# Patient Record
Sex: MALE | Race: WHITE | Employment: FULL TIME | URBAN - NONMETROPOLITAN AREA
[De-identification: names, ages, dates, MRNs, and addresses within clinical notes are randomized per-mention and may not be internally consistent; named-entity substitution may affect disease eponyms.]

---

## 2017-06-12 ENCOUNTER — TELEPHONE (OUTPATIENT)
Dept: FAMILY MEDICINE CLINIC | Age: 32
End: 2017-06-12

## 2017-06-12 DIAGNOSIS — I10 ESSENTIAL HYPERTENSION: Primary | Chronic | ICD-10-CM

## 2017-06-12 RX ORDER — LISINOPRIL AND HYDROCHLOROTHIAZIDE 20; 12.5 MG/1; MG/1
1 TABLET ORAL DAILY
Qty: 90 TABLET | Refills: 3 | Status: SHIPPED | OUTPATIENT
Start: 2017-06-12 | End: 2018-07-12 | Stop reason: SDUPTHER

## 2017-06-26 ENCOUNTER — TELEPHONE (OUTPATIENT)
Dept: FAMILY MEDICINE CLINIC | Age: 32
End: 2017-06-26

## 2017-06-26 DIAGNOSIS — R74.01 ELEVATED ALT MEASUREMENT: Primary | ICD-10-CM

## 2017-07-03 ENCOUNTER — TELEPHONE (OUTPATIENT)
Dept: FAMILY MEDICINE CLINIC | Age: 32
End: 2017-07-03

## 2017-07-25 ENCOUNTER — OFFICE VISIT (OUTPATIENT)
Dept: FAMILY MEDICINE CLINIC | Age: 32
End: 2017-07-25
Payer: COMMERCIAL

## 2017-07-25 VITALS
HEART RATE: 82 BPM | TEMPERATURE: 97.9 F | SYSTOLIC BLOOD PRESSURE: 132 MMHG | DIASTOLIC BLOOD PRESSURE: 84 MMHG | WEIGHT: 236 LBS | HEIGHT: 70 IN | BODY MASS INDEX: 33.79 KG/M2 | RESPIRATION RATE: 16 BRPM

## 2017-07-25 DIAGNOSIS — R74.01 ELEVATED ALT MEASUREMENT: ICD-10-CM

## 2017-07-25 DIAGNOSIS — K21.9 GASTROESOPHAGEAL REFLUX DISEASE, ESOPHAGITIS PRESENCE NOT SPECIFIED: Chronic | ICD-10-CM

## 2017-07-25 DIAGNOSIS — R07.9 CHEST PAIN, UNSPECIFIED TYPE: ICD-10-CM

## 2017-07-25 DIAGNOSIS — I10 ESSENTIAL HYPERTENSION: Primary | Chronic | ICD-10-CM

## 2017-07-25 DIAGNOSIS — R13.14 PHARYNGOESOPHAGEAL DYSPHAGIA: ICD-10-CM

## 2017-07-25 DIAGNOSIS — F17.220 CHEWING TOBACCO NICOTINE DEPENDENCE WITHOUT COMPLICATION: Chronic | ICD-10-CM

## 2017-07-25 PROCEDURE — 99214 OFFICE O/P EST MOD 30 MIN: CPT | Performed by: FAMILY MEDICINE

## 2017-07-25 PROCEDURE — 93000 ELECTROCARDIOGRAM COMPLETE: CPT | Performed by: FAMILY MEDICINE

## 2017-07-25 RX ORDER — ASPIRIN 81 MG/1
81 TABLET ORAL DAILY
Qty: 30 TABLET | Refills: 1 | Status: SHIPPED | OUTPATIENT
Start: 2017-07-25 | End: 2018-07-12

## 2017-07-25 RX ORDER — OMEPRAZOLE 20 MG/1
20 CAPSULE, DELAYED RELEASE ORAL DAILY
Qty: 30 CAPSULE | Refills: 5 | Status: SHIPPED | OUTPATIENT
Start: 2017-07-25 | End: 2018-07-12 | Stop reason: SDUPTHER

## 2017-07-25 ASSESSMENT — PATIENT HEALTH QUESTIONNAIRE - PHQ9
SUM OF ALL RESPONSES TO PHQ9 QUESTIONS 1 & 2: 2
2. FEELING DOWN, DEPRESSED OR HOPELESS: 1
1. LITTLE INTEREST OR PLEASURE IN DOING THINGS: 1
SUM OF ALL RESPONSES TO PHQ QUESTIONS 1-9: 2

## 2017-08-08 ENCOUNTER — OFFICE VISIT (OUTPATIENT)
Dept: CARDIOLOGY CLINIC | Age: 32
End: 2017-08-08
Payer: COMMERCIAL

## 2017-08-08 VITALS
WEIGHT: 232.6 LBS | HEART RATE: 72 BPM | HEIGHT: 69 IN | SYSTOLIC BLOOD PRESSURE: 138 MMHG | DIASTOLIC BLOOD PRESSURE: 88 MMHG | BODY MASS INDEX: 34.45 KG/M2

## 2017-08-08 DIAGNOSIS — R07.9 CHEST PAIN, UNSPECIFIED TYPE: Primary | ICD-10-CM

## 2017-08-08 DIAGNOSIS — I10 ESSENTIAL HYPERTENSION: Chronic | ICD-10-CM

## 2017-08-08 DIAGNOSIS — Z87.891 FORMER SMOKER: Chronic | ICD-10-CM

## 2017-08-08 PROCEDURE — 99213 OFFICE O/P EST LOW 20 MIN: CPT | Performed by: INTERNAL MEDICINE

## 2017-08-08 ASSESSMENT — ENCOUNTER SYMPTOMS
COUGH: 0
BLOATING: 0
NAIL CHANGES: 0
DOUBLE VISION: 0
HOARSE VOICE: 0
SHORTNESS OF BREATH: 1
APHONIA: 0
HEMOPTYSIS: 0
BACK PAIN: 0
ABDOMINAL PAIN: 0
COLOR CHANGE: 0
EYE DISCHARGE: 0
SLEEP DISTURBANCES DUE TO BREATHING: 0
ORTHOPNEA: 0
CHANGE IN BOWEL HABIT: 0
EYE PAIN: 0
DIARRHEA: 0
PHOTOPHOBIA: 0
BLURRED VISION: 0
CONSTIPATION: 0
SNORING: 0

## 2017-08-14 ENCOUNTER — HOSPITAL ENCOUNTER (OUTPATIENT)
Dept: NON INVASIVE DIAGNOSTICS | Age: 32
Discharge: HOME | End: 2017-08-14
Payer: COMMERCIAL

## 2017-08-14 VITALS — HEIGHT: 69 IN | WEIGHT: 240 LBS | BODY MASS INDEX: 35.55 KG/M2

## 2017-08-14 DIAGNOSIS — R07.9 CHEST PAIN, UNSPECIFIED TYPE: ICD-10-CM

## 2017-08-14 LAB
LV EF: 61 %
LVEF MODALITY: NORMAL

## 2017-08-14 PROCEDURE — 78452 HT MUSCLE IMAGE SPECT MULT: CPT

## 2017-08-14 PROCEDURE — 3430000000 HC RX DIAGNOSTIC RADIOPHARMACEUTICAL: Performed by: INTERNAL MEDICINE

## 2017-08-14 PROCEDURE — 93017 CV STRESS TEST TRACING ONLY: CPT

## 2017-08-14 PROCEDURE — A9500 TC99M SESTAMIBI: HCPCS | Performed by: INTERNAL MEDICINE

## 2017-08-14 RX ADMIN — Medication 9.4 MILLICURIE: at 13:10

## 2017-08-14 RX ADMIN — Medication 33.9 MILLICURIE: at 14:00

## 2017-09-05 ENCOUNTER — OFFICE VISIT (OUTPATIENT)
Dept: CARDIOLOGY CLINIC | Age: 32
End: 2017-09-05
Payer: COMMERCIAL

## 2017-09-05 VITALS
SYSTOLIC BLOOD PRESSURE: 132 MMHG | HEIGHT: 69 IN | BODY MASS INDEX: 34.8 KG/M2 | WEIGHT: 235 LBS | DIASTOLIC BLOOD PRESSURE: 74 MMHG | HEART RATE: 70 BPM

## 2017-09-05 DIAGNOSIS — R07.9 CHEST PAIN, UNSPECIFIED TYPE: Primary | ICD-10-CM

## 2017-09-05 PROCEDURE — 99213 OFFICE O/P EST LOW 20 MIN: CPT | Performed by: INTERNAL MEDICINE

## 2017-09-05 PROCEDURE — 93000 ELECTROCARDIOGRAM COMPLETE: CPT | Performed by: INTERNAL MEDICINE

## 2017-09-05 ASSESSMENT — ENCOUNTER SYMPTOMS
COUGH: 0
SNORING: 0
DOUBLE VISION: 0
APHONIA: 0
EYE DISCHARGE: 0
BACK PAIN: 0
DIARRHEA: 0
HEMOPTYSIS: 0
PHOTOPHOBIA: 0
NAIL CHANGES: 0
EYE PAIN: 0
BLURRED VISION: 0
ORTHOPNEA: 0
SHORTNESS OF BREATH: 1
COLOR CHANGE: 0
ABDOMINAL PAIN: 0
BLOATING: 0
SLEEP DISTURBANCES DUE TO BREATHING: 0
CONSTIPATION: 0
CHANGE IN BOWEL HABIT: 0
HOARSE VOICE: 0

## 2017-10-18 ENCOUNTER — TELEPHONE (OUTPATIENT)
Dept: FAMILY MEDICINE CLINIC | Age: 32
End: 2017-10-18

## 2017-10-18 NOTE — TELEPHONE ENCOUNTER
2nd attempt to contact the pt re:overdue labs Dr Leslie Roberts ordered on 7/25/17. HIPAA form is up to date, order mailed.

## 2018-01-15 ENCOUNTER — HOSPITAL ENCOUNTER (OUTPATIENT)
Age: 33
Discharge: HOME | End: 2018-01-15
Payer: COMMERCIAL

## 2018-01-15 DIAGNOSIS — R74.01 ELEVATED ALT MEASUREMENT: ICD-10-CM

## 2018-01-15 LAB
ALBUMIN SERPL-MCNC: 4.4 G/DL (ref 3.5–5.1)
ALP BLD-CCNC: 73 U/L (ref 38–126)
ALT SERPL-CCNC: 27 U/L (ref 11–66)
AST SERPL-CCNC: 18 U/L (ref 5–40)
BILIRUB SERPL-MCNC: 0.6 MG/DL (ref 0.3–1.2)
BILIRUBIN DIRECT: < 0.2 MG/DL (ref 0–0.3)
TOTAL PROTEIN: 7.2 G/DL (ref 6.1–8)

## 2018-01-15 PROCEDURE — 36415 COLL VENOUS BLD VENIPUNCTURE: CPT

## 2018-01-15 PROCEDURE — 80076 HEPATIC FUNCTION PANEL: CPT

## 2018-01-16 ENCOUNTER — TELEPHONE (OUTPATIENT)
Dept: FAMILY MEDICINE CLINIC | Age: 33
End: 2018-01-16

## 2018-01-16 NOTE — TELEPHONE ENCOUNTER
----- Message from Evelin Duenas,  sent at 1/15/2018  7:02 PM EST -----  Please let pt know that repeat LFT WNL  Will check again at July visit. Let me know if questions, thanks!

## 2018-02-04 ENCOUNTER — APPOINTMENT (OUTPATIENT)
Dept: CT IMAGING | Age: 33
End: 2018-02-04

## 2018-02-04 ENCOUNTER — HOSPITAL ENCOUNTER (EMERGENCY)
Age: 33
Discharge: HOME | End: 2018-02-04

## 2018-02-04 VITALS
WEIGHT: 220 LBS | SYSTOLIC BLOOD PRESSURE: 154 MMHG | TEMPERATURE: 98.9 F | BODY MASS INDEX: 32.49 KG/M2 | HEART RATE: 83 BPM | RESPIRATION RATE: 18 BRPM | OXYGEN SATURATION: 100 % | DIASTOLIC BLOOD PRESSURE: 92 MMHG

## 2018-02-04 DIAGNOSIS — K02.9 DENTAL CARIES: Primary | ICD-10-CM

## 2018-02-04 LAB
ANION GAP SERPL CALCULATED.3IONS-SCNC: 14 MEQ/L (ref 8–16)
BASOPHILS # BLD: 0.4 %
BASOPHILS ABSOLUTE: 0 THOU/MM3 (ref 0–0.1)
BUN BLDV-MCNC: 6 MG/DL (ref 7–22)
CALCIUM SERPL-MCNC: 9.2 MG/DL (ref 8.5–10.5)
CHLORIDE BLD-SCNC: 101 MEQ/L (ref 98–111)
CO2: 25 MEQ/L (ref 23–33)
CREAT SERPL-MCNC: 0.9 MG/DL (ref 0.4–1.2)
EOSINOPHIL # BLD: 1.6 %
EOSINOPHILS ABSOLUTE: 0.1 THOU/MM3 (ref 0–0.4)
GFR SERPL CREATININE-BSD FRML MDRD: > 90 ML/MIN/1.73M2
GLUCOSE BLD-MCNC: 98 MG/DL (ref 70–108)
GROUP A STREP CULTURE, REFLEX: NEGATIVE
HCT VFR BLD CALC: 42.4 % (ref 42–52)
HEMOGLOBIN: 14.2 GM/DL (ref 14–18)
LYMPHOCYTES # BLD: 33.9 %
LYMPHOCYTES ABSOLUTE: 2.1 THOU/MM3 (ref 1–4.8)
MCH RBC QN AUTO: 29.5 PG (ref 27–31)
MCHC RBC AUTO-ENTMCNC: 33.5 GM/DL (ref 33–37)
MCV RBC AUTO: 87.8 FL (ref 80–94)
MONOCYTES # BLD: 7.1 %
MONOCYTES ABSOLUTE: 0.4 THOU/MM3 (ref 0.4–1.3)
NUCLEATED RED BLOOD CELLS: 0 /100 WBC
OSMOLALITY CALCULATION: 277 MOSMOL/KG (ref 275–300)
PDW BLD-RTO: 13.1 % (ref 11.5–14.5)
PLATELET # BLD: 273 THOU/MM3 (ref 130–400)
PMV BLD AUTO: 8.1 FL (ref 7.4–10.4)
POTASSIUM SERPL-SCNC: 3.8 MEQ/L (ref 3.5–5.2)
RBC # BLD: 4.82 MILL/MM3 (ref 4.7–6.1)
REFLEX THROAT C + S: NORMAL
SEG NEUTROPHILS: 57 %
SEGMENTED NEUTROPHILS ABSOLUTE COUNT: 3.5 THOU/MM3 (ref 1.8–7.7)
SODIUM BLD-SCNC: 140 MEQ/L (ref 135–145)
WBC # BLD: 6.2 THOU/MM3 (ref 4.8–10.8)

## 2018-02-04 PROCEDURE — 70491 CT SOFT TISSUE NECK W/DYE: CPT

## 2018-02-04 PROCEDURE — 36415 COLL VENOUS BLD VENIPUNCTURE: CPT

## 2018-02-04 PROCEDURE — 99284 EMERGENCY DEPT VISIT MOD MDM: CPT

## 2018-02-04 PROCEDURE — 6360000002 HC RX W HCPCS: Performed by: NURSE PRACTITIONER

## 2018-02-04 PROCEDURE — 87070 CULTURE OTHR SPECIMN AEROBIC: CPT

## 2018-02-04 PROCEDURE — 80048 BASIC METABOLIC PNL TOTAL CA: CPT

## 2018-02-04 PROCEDURE — 6360000004 HC RX CONTRAST MEDICATION: Performed by: NURSE PRACTITIONER

## 2018-02-04 PROCEDURE — 87880 STREP A ASSAY W/OPTIC: CPT

## 2018-02-04 PROCEDURE — 85025 COMPLETE CBC W/AUTO DIFF WBC: CPT

## 2018-02-04 PROCEDURE — 96374 THER/PROPH/DIAG INJ IV PUSH: CPT

## 2018-02-04 RX ORDER — KETOROLAC TROMETHAMINE 30 MG/ML
30 INJECTION, SOLUTION INTRAMUSCULAR; INTRAVENOUS ONCE
Status: COMPLETED | OUTPATIENT
Start: 2018-02-04 | End: 2018-02-04

## 2018-02-04 RX ORDER — PENICILLIN V POTASSIUM 500 MG/1
500 TABLET ORAL 4 TIMES DAILY
Qty: 40 TABLET | Refills: 0 | Status: SHIPPED | OUTPATIENT
Start: 2018-02-04 | End: 2018-02-14

## 2018-02-04 RX ORDER — TRAMADOL HYDROCHLORIDE 50 MG/1
50 TABLET ORAL EVERY 8 HOURS PRN
Qty: 9 TABLET | Refills: 0 | Status: SHIPPED | OUTPATIENT
Start: 2018-02-04 | End: 2018-02-07

## 2018-02-04 RX ADMIN — IOPAMIDOL 75 ML: 755 INJECTION, SOLUTION INTRAVENOUS at 20:24

## 2018-02-04 RX ADMIN — KETOROLAC TROMETHAMINE 30 MG: 30 INJECTION, SOLUTION INTRAMUSCULAR at 19:58

## 2018-02-04 ASSESSMENT — ENCOUNTER SYMPTOMS
EYE DISCHARGE: 0
VOMITING: 0
ABDOMINAL PAIN: 0
COUGH: 0
SHORTNESS OF BREATH: 0
WHEEZING: 0
RHINORRHEA: 0
EYE REDNESS: 0
NAUSEA: 0
BACK PAIN: 0
DIARRHEA: 0
SORE THROAT: 0

## 2018-02-04 ASSESSMENT — PAIN SCALES - GENERAL: PAINLEVEL_OUTOF10: 10

## 2018-02-04 ASSESSMENT — PAIN DESCRIPTION - ORIENTATION: ORIENTATION: RIGHT

## 2018-02-04 ASSESSMENT — PAIN DESCRIPTION - PAIN TYPE: TYPE: ACUTE PAIN

## 2018-02-04 ASSESSMENT — PAIN DESCRIPTION - LOCATION: LOCATION: FACE

## 2018-02-04 ASSESSMENT — PAIN DESCRIPTION - DESCRIPTORS: DESCRIPTORS: ACHING

## 2018-02-05 NOTE — ED PROVIDER NOTES
[facility]  eMERGENCY dEPARTMENT eNCOUnter          CHIEF COMPLAINT       Chief Complaint   Patient presents with    Facial Pain       Nurses Notes reviewed and I agree except as noted in the HPI. HISTORY OF PRESENT ILLNESS    Vladimir Hawkins is a 34 y.o. male who presents to the Emergency Department for the evaluation of facial pain. The patient states the right side of his jaw hurts from under his chin up to his temple. The patient states that he is able to open his jaw and talk with no problem. He does have pain occasionally when swallowing. The patient rates the pain as 10/10 and states it worsens with touch. The patient reports having bad teeth, but denies history of dental abscess. The patient denies chills and fever. The HPI was provided by the patient. REVIEW OF SYSTEMS     Review of Systems   Constitutional: Negative for appetite change, chills, fatigue and fever. HENT: Negative for congestion, ear pain, rhinorrhea and sore throat. Right sided jaw pain under the chin to the temple   Eyes: Negative for discharge, redness and visual disturbance. Respiratory: Negative for cough, shortness of breath and wheezing. Cardiovascular: Negative for chest pain, palpitations and leg swelling. Gastrointestinal: Negative for abdominal pain, diarrhea, nausea and vomiting. Genitourinary: Negative for decreased urine volume, difficulty urinating and dysuria. Musculoskeletal: Negative for arthralgias, back pain, joint swelling and neck pain. Skin: Negative for pallor and rash. Allergic/Immunologic: Negative for environmental allergies. Neurological: Negative for dizziness, syncope, weakness, light-headedness and headaches. Hematological: Negative for adenopathy. Psychiatric/Behavioral: Negative for agitation, confusion, dysphoric mood and suicidal ideas. The patient is not nervous/anxious.         PAST MEDICAL HISTORY    has a past medical history of Dependence on nicotine from chewing tobacco; Eczema; Former smoker; GERD (gastroesophageal reflux disease); History of asthma; Hypertension; and Migraine headache. SURGICAL HISTORY      has a past surgical history that includes Colonoscopy; laparoscopic appendectomy (2013); Appendectomy (2013); Tonsillectomy; and Finger surgery (10/17/15). CURRENT MEDICATIONS       Previous Medications    ASPIRIN EC 81 MG EC TABLET    Take 1 tablet by mouth daily    LISINOPRIL-HYDROCHLOROTHIAZIDE (PRINZIDE) 20-12.5 MG PER TABLET    Take 1 tablet by mouth daily    OMEPRAZOLE (PRILOSEC) 20 MG DELAYED RELEASE CAPSULE    Take 1 capsule by mouth daily       ALLERGIES     has No Known Allergies. FAMILY HISTORY     indicated that his mother is . He indicated that his father is . He indicated that the status of his paternal grandfather is unknown. He indicated that the status of his other is unknown. He indicated that the status of his neg hx is unknown.    family history includes Early Death in his father; Heart Disease (age of onset: 52) in his mother; High Blood Pressure in an other family member; Prostate Cancer in his paternal grandfather. SOCIAL HISTORY      reports that he quit smoking about 3 years ago. His smoking use included Cigarettes. He has a 9.00 pack-year smoking history. He uses smokeless tobacco. He reports that he does not drink alcohol or use drugs. PHYSICAL EXAM     INITIAL VITALS:  weight is 220 lb (99.8 kg). His oral temperature is 98.9 °F (37.2 °C). His blood pressure is 154/92 (abnormal) and his pulse is 83. His respiration is 18 and oxygen saturation is 100%. Physical Exam   Constitutional: He is oriented to person, place, and time. He appears well-developed and well-nourished. HENT:   Head: Normocephalic and atraumatic. Right Ear: External ear normal.   Left Ear: External ear normal.   Mouth/Throat: Uvula is midline and mucous membranes are normal. No trismus in the jaw.  No

## 2018-02-06 LAB — THROAT/NOSE CULTURE: NORMAL

## 2018-04-19 ENCOUNTER — HOSPITAL ENCOUNTER (OUTPATIENT)
Age: 33
Discharge: HOME | End: 2018-04-19
Payer: COMMERCIAL

## 2018-04-19 LAB
ALBUMIN SERPL-MCNC: 4.3 G/DL (ref 3.5–5.1)
ALP BLD-CCNC: 82 U/L (ref 38–126)
ALT SERPL-CCNC: 28 U/L (ref 11–66)
AST SERPL-CCNC: 19 U/L (ref 5–40)
BILIRUB SERPL-MCNC: 0.5 MG/DL (ref 0.3–1.2)
BILIRUBIN DIRECT: < 0.2 MG/DL (ref 0–0.3)
TOTAL PROTEIN: 7.3 G/DL (ref 6.1–8)

## 2018-04-19 PROCEDURE — 36415 COLL VENOUS BLD VENIPUNCTURE: CPT

## 2018-04-19 PROCEDURE — 80076 HEPATIC FUNCTION PANEL: CPT

## 2018-07-11 PROBLEM — R74.01 ELEVATED ALT MEASUREMENT: Status: RESOLVED | Noted: 2017-07-25 | Resolved: 2018-07-11

## 2018-07-12 ENCOUNTER — OFFICE VISIT (OUTPATIENT)
Dept: FAMILY MEDICINE CLINIC | Age: 33
End: 2018-07-12
Payer: COMMERCIAL

## 2018-07-12 VITALS
HEIGHT: 69 IN | HEART RATE: 80 BPM | TEMPERATURE: 98.2 F | RESPIRATION RATE: 16 BRPM | DIASTOLIC BLOOD PRESSURE: 76 MMHG | WEIGHT: 219 LBS | SYSTOLIC BLOOD PRESSURE: 122 MMHG | BODY MASS INDEX: 32.44 KG/M2

## 2018-07-12 DIAGNOSIS — F17.220 CHEWING TOBACCO NICOTINE DEPENDENCE WITHOUT COMPLICATION: Chronic | ICD-10-CM

## 2018-07-12 DIAGNOSIS — I10 ESSENTIAL HYPERTENSION: Primary | ICD-10-CM

## 2018-07-12 DIAGNOSIS — K58.0 IRRITABLE BOWEL SYNDROME WITH DIARRHEA: ICD-10-CM

## 2018-07-12 DIAGNOSIS — K76.0 NAFLD (NONALCOHOLIC FATTY LIVER DISEASE): ICD-10-CM

## 2018-07-12 DIAGNOSIS — K21.9 GASTROESOPHAGEAL REFLUX DISEASE, ESOPHAGITIS PRESENCE NOT SPECIFIED: ICD-10-CM

## 2018-07-12 PROBLEM — R07.9 CHEST PAIN: Status: RESOLVED | Noted: 2017-07-25 | Resolved: 2018-07-12

## 2018-07-12 PROCEDURE — G8427 DOCREV CUR MEDS BY ELIG CLIN: HCPCS | Performed by: FAMILY MEDICINE

## 2018-07-12 PROCEDURE — G8417 CALC BMI ABV UP PARAM F/U: HCPCS | Performed by: FAMILY MEDICINE

## 2018-07-12 PROCEDURE — 99214 OFFICE O/P EST MOD 30 MIN: CPT | Performed by: FAMILY MEDICINE

## 2018-07-12 PROCEDURE — 4004F PT TOBACCO SCREEN RCVD TLK: CPT | Performed by: FAMILY MEDICINE

## 2018-07-12 RX ORDER — OMEPRAZOLE 20 MG/1
20 CAPSULE, DELAYED RELEASE ORAL DAILY
Qty: 90 CAPSULE | Refills: 3 | Status: SHIPPED | OUTPATIENT
Start: 2018-07-12 | End: 2019-07-30 | Stop reason: SDUPTHER

## 2018-07-12 RX ORDER — LISINOPRIL AND HYDROCHLOROTHIAZIDE 20; 12.5 MG/1; MG/1
1 TABLET ORAL DAILY
Qty: 90 TABLET | Refills: 3 | Status: SHIPPED | OUTPATIENT
Start: 2018-07-12 | End: 2019-07-30 | Stop reason: SDUPTHER

## 2018-07-12 NOTE — PROGRESS NOTES
Chief Complaint   Patient presents with    Follow-up     HTN, GERD, IBS, NAFLD, Chew Tobacco       History obtained from the patient. SUBJECTIVE:  Eric Syed is a 34 y.o. male that presents today for       1.) HTN:    HPI:    Taking meds as prescribed ?: Yes  Tolerating well ?: yes  Side Effects ?: denies  BP at home ?: <140/90  Working on TLCS ?: yes  Chest Pain/SOB/Palpitations? Chest pain    BP Readings from Last 3 Encounters:   07/12/18 122/76   02/04/18 (!) 154/92   09/05/17 132/74       2.) GERD:    HPI:    Taking meds as prescribed ?: yes  Tolerating well ?: yes  Side Effects ?: denies  Dysphagia ?: denies  Odynophagia ?: denies  Melena ?: denies  Working on TLCS ?: yes      3.) Elevated ALT LAST VISIT: noted on labs. Mild. Denies RUQ pain or jaundice. No ETOH use. Grandfather with cirriosis at old age. UPDATE TODAY: neg w/u, saw GI, has fatty liver. Lost some wt. Labs better. Denies RUQ pain or jaundice. 4.) IBS: on Viberzi, works well. Following with GI. Helps with diarrhea.        5.) Declines tobacco cessation      Age/Gender Health Maintenance    Lipid -   No components found for: CHLPL  Lab Results   Component Value Date    TRIG 113 06/24/2017    TRIG 79 10/24/2015    TRIG 92 10/25/2014     Lab Results   Component Value Date    HDL 35 06/24/2017    HDL 34 10/24/2015    HDL 36 10/25/2014     Lab Results   Component Value Date    LDLCALC 132 06/24/2017    [address] 107 10/24/2015    [address] 128 10/25/2014     No results found for: LABVLDL    DM Screen -   Lab Results   Component Value Date    GLUCOSE 98 02/04/2018       Colon Cancer Screening - Age 47    Tetanus - Declines July 2017  Influenza Vaccine - Candidate FALL 2017  Pneumonia Vaccine - Age 72  Zostavax - Age 60     PSA testing discussion - Age 53  AAA Screening - Age 71; smoked    Falls screening - N/A      Current Outpatient Prescriptions   Medication Sig Dispense Refill    lisinopril-hydrochlorothiazide (PRINZIDE) 20-12.5 Quit date: 10/23/2014    Smokeless tobacco: Current User    Alcohol use No      Comment: social    Drug use: No    Sexual activity: Not on file     Other Topics Concern    Not on file     Social History Narrative    No narrative on file       Family History   Problem Relation Age of Onset    Heart Disease Mother 52        MI    Early Death Father         MVA    Prostate Cancer Paternal Grandfather         unsure age   [place] High Blood Pressure Other         Pt unsure if anyone in family does.  Colon Cancer Neg Hx          I have reviewed the patient's past medical history, past surgical history, allergies, medications, social and family history and I have made updates where appropriate. Review of Systems  Positive responses are highlighted in bold    Constitutional:  Fever, Chills, Night Sweats, Fatigue, Unexpected changes in weight  HENT:  Ear pain, Tinnitus, Nosebleeds, Trouble swallowing, Hearing loss, Sore throat  Cardiovascular:  Chest Pain, Palpitations, Orthopnea, Paroxysmal Nocturnal Dyspnea  Respiratory:  Cough, Wheezing, Shortness of breath, Chest tightness, Apnea  Gastrointestinal:  Nausea, Vomiting, Diarrhea, Constipation, Heartburn, Blood in stool  Genitourinary:  Difficulty or painful urination, Flank pain, Change in frequency, Urgency  Skin:  Color change, Rash, Itching, Wound  Musculoskeletal:  Joint pain, Back pain, Gait problems, Joint swelling, Myalgias  Neurological:  Dizziness, Headaches, Presyncope, Numbness, Seizures, Tremors  Endocrine:  Heat Intolerance, Cold Intolerance, Polydipsia, Polyphagia, Polyuria      PHYSICAL EXAM:  Vitals:    07/12/18 1746   BP: 122/76   Pulse: 80   Resp: 16   Temp: 98.2 °F (36.8 °C)   TempSrc: Oral   Weight: 219 lb (99.3 kg)   Height: 5' 9\" (1.753 m)     Body mass index is 32.34 kg/m².   Pain Score:   0 - No pain    VS Reviewed  General Appearance: A&O x 3, No acute distress,well developed and well- nourished  Eyes: pupils equal, round, and reactive classes as well as 1-800-QUIT NOW. No barriers other than lack of desire. 3+ min spent counseling. DISPOSITION    Return in about 1 year (around 7/12/2019) for follow-up high blood pressure, follow-up on chronic medical conditions, sooner as needed. Melvi Longoria released without restrictions. Future Appointments  Date Time Provider Tylor Reyes   9/13/2018 11:00 AM Ena Elder MD [facility] [place]   7/18/2019 6:00 PM DO Unique Wright received counseling on the following healthy behaviors: nutrition, exercise, medication adherence and tobacco cessation    Patient given educational materials on: See Attached    I have instructed Melvi Longoria to complete a self tracking handout on Blood Sugars  and tobacco use and instructed them to bring it with them to his next appointment. Barriers to learning and self management: none    Discussed use, benefit, and side effects of prescribed medications. Barriers to medication compliance addressed. All patient questions answered. Pt voiced understanding.        Electronically signed by Erlin Hill DO on 7/12/2018 at 6:21 PM

## 2018-07-12 NOTE — PATIENT INSTRUCTIONS
LAB INSTRUCTIONS:    Please complete labs within 4 week(s). Please fast for 8 hours prior to lab collection. The clinic will call you within 1 week of collection. If you have not heard from us within that amount of time, please call us at [phone]. Patient Education        High Blood Pressure: Care Instructions  Your Care Instructions    If your blood pressure is usually above 130/80, you have high blood pressure, or hypertension. That means the top number is 130 or higher or the bottom number is 80 or higher, or both. Despite what a lot of people think, high blood pressure usually doesn't cause headaches or make you feel dizzy or lightheaded. It usually has no symptoms. But it does increase your risk for heart attack, stroke, and kidney or eye damage. The higher your blood pressure, the more your risk increases. Your doctor will give you a goal for your blood pressure. Your goal will be based on your health and your age. Lifestyle changes, such as eating healthy and being active, are always important to help lower blood pressure. You might also take medicine to reach your blood pressure goal.  Follow-up care is a key part of your treatment and safety. Be sure to make and go to all appointments, and call your doctor if you are having problems. It's also a good idea to know your test results and keep a list of the medicines you take. How can you care for yourself at home? Medical treatment  · If you stop taking your medicine, your blood pressure will go back up. You may take one or more types of medicine to lower your blood pressure. Be safe with medicines. Take your medicine exactly as prescribed. Call your doctor if you think you are having a problem with your medicine. · Talk to your doctor before you start taking aspirin every day. Aspirin can help certain people lower their risk of a heart attack or stroke.  But taking aspirin isn't right for everyone, because it can cause serious bleeding. · See your doctor regularly. You may need to see the doctor more often at first or until your blood pressure comes down. · If you are taking blood pressure medicine, talk to your doctor before you take decongestants or anti-inflammatory medicine, such as ibuprofen. Some of these medicines can raise blood pressure. · Learn how to check your blood pressure at home. Lifestyle changes  · Stay at a healthy weight. This is especially important if you put on weight around the waist. Losing even 10 pounds can help you lower your blood pressure. · If your doctor recommends it, get more exercise. Walking is a good choice. Bit by bit, increase the amount you walk every day. Try for at least 30 minutes on most days of the week. You also may want to swim, bike, or do other activities. · Avoid or limit alcohol. Talk to your doctor about whether you can drink any alcohol. · Try to limit how much sodium you eat to less than 2,300 milligrams (mg) a day. Your doctor may ask you to try to eat less than 1,500 mg a day. · Eat plenty of fruits (such as bananas and oranges), vegetables, legumes, whole grains, and low-fat dairy products. · Lower the amount of saturated fat in your diet. Saturated fat is found in animal products such as milk, cheese, and meat. Limiting these foods may help you lose weight and also lower your risk for heart disease. · Do not smoke. Smoking increases your risk for heart attack and stroke. If you need help quitting, talk to your doctor about stop-smoking programs and medicines. These can increase your chances of quitting for good. When should you call for help? Call 911 anytime you think you may need emergency care. This may mean having symptoms that suggest that your blood pressure is causing a serious heart or blood vessel problem. Your blood pressure may be over 180/110.   For example, call 911 if:    · You have symptoms of a heart attack.  These may include:  ¨ Chest pain or pressure, Health. If you have questions about a medical condition or this instruction, always ask your healthcare professional. [place] [address] any warranty or liability for your use of this information. Patient Education        Stopping Smokeless Tobacco Use: Care Instructions  Your Care Instructions    Smokeless tobacco comes in many forms, such as snuff and chewing tobacco:  · Snuff is finely ground tobacco sold in cans or pouches. Most of the time, snuff is used by putting a \"pinch\" or \"dip\" between the lower lip or cheek and the gum. · Chewing tobacco is sold as loose leaves, plugs, or twists. It is chewed or placed between the cheek and the gum or teeth. There are plenty of reasons to stop using smokeless tobacco. These products are harmful. They are not risk-free alternatives to smoking. Smokeless tobacco contains nicotine, which is addicting. Though using smokeless tobacco is less harmful than smoking cigarettes, it can cause serious health problems, such as:  · White patches or red sores in your mouth that can turn into mouth cancer involving the lip, tongue, or cheek. · Tooth loss and other dental problems. · Gum disease. Your gums may pull away from your teeth and not grow back. People who use smokeless tobacco crave the nicotine in it. Giving up smokeless tobacco is much harder than simply changing a habit. Your body has to stop craving the nicotine. It is hard to quit, but you can do it. Many tools are available for people who want to quit using smokeless tobacco. You may find that combining tools works best for you. There are several steps to quitting. First you get ready to quit. Then you get support to help you. After that, you learn new skills and behaviors to quit. For many people, a necessary step is getting and using medicine. Your doctor will help you set up the plan that best meets your needs.  You may want to attend a tobacco cessation program. When you choose a program,

## 2018-09-20 ENCOUNTER — TELEPHONE (OUTPATIENT)
Dept: CARDIOLOGY CLINIC | Age: 33
End: 2018-09-20

## 2018-10-03 ENCOUNTER — TELEPHONE (OUTPATIENT)
Dept: FAMILY MEDICINE CLINIC | Age: 33
End: 2018-10-03

## 2018-10-18 ENCOUNTER — TELEPHONE (OUTPATIENT)
Dept: FAMILY MEDICINE CLINIC | Age: 33
End: 2018-10-18

## 2018-12-03 ENCOUNTER — APPOINTMENT (OUTPATIENT)
Dept: GENERAL RADIOLOGY | Age: 33
End: 2018-12-03
Payer: COMMERCIAL

## 2018-12-03 ENCOUNTER — HOSPITAL ENCOUNTER (EMERGENCY)
Age: 33
Discharge: HOME | End: 2018-12-03
Payer: COMMERCIAL

## 2018-12-03 ENCOUNTER — APPOINTMENT (OUTPATIENT)
Dept: CT IMAGING | Age: 33
End: 2018-12-03
Payer: COMMERCIAL

## 2018-12-03 VITALS
TEMPERATURE: 97.7 F | HEIGHT: 69 IN | WEIGHT: 220 LBS | OXYGEN SATURATION: 98 % | HEART RATE: 58 BPM | SYSTOLIC BLOOD PRESSURE: 142 MMHG | RESPIRATION RATE: 15 BRPM | DIASTOLIC BLOOD PRESSURE: 83 MMHG | BODY MASS INDEX: 32.58 KG/M2

## 2018-12-03 DIAGNOSIS — M54.9 MID BACK PAIN: ICD-10-CM

## 2018-12-03 DIAGNOSIS — R07.9 CHEST PAIN, UNSPECIFIED TYPE: Primary | ICD-10-CM

## 2018-12-03 DIAGNOSIS — R06.02 SHORTNESS OF BREATH: ICD-10-CM

## 2018-12-03 LAB
ALBUMIN SERPL-MCNC: 4.1 G/DL (ref 3.5–5.1)
ALP BLD-CCNC: 87 U/L (ref 38–126)
ALT SERPL-CCNC: 64 U/L (ref 11–66)
ANION GAP SERPL CALCULATED.3IONS-SCNC: 12 MEQ/L (ref 8–16)
AST SERPL-CCNC: 53 U/L (ref 5–40)
BASOPHILS # BLD: 0.6 %
BASOPHILS ABSOLUTE: 0 THOU/MM3 (ref 0–0.1)
BILIRUB SERPL-MCNC: 0.2 MG/DL (ref 0.3–1.2)
BUN BLDV-MCNC: 5 MG/DL (ref 7–22)
C-REACTIVE PROTEIN: 0.22 MG/DL (ref 0–1)
CALCIUM SERPL-MCNC: 9 MG/DL (ref 8.5–10.5)
CHLORIDE BLD-SCNC: 100 MEQ/L (ref 98–111)
CO2: 22 MEQ/L (ref 23–33)
CREAT SERPL-MCNC: 0.8 MG/DL (ref 0.4–1.2)
EKG ATRIAL RATE: 72 BPM
EKG P AXIS: 36 DEGREES
EKG P-R INTERVAL: 152 MS
EKG Q-T INTERVAL: 388 MS
EKG QRS DURATION: 94 MS
EKG QTC CALCULATION (BAZETT): 424 MS
EKG R AXIS: -18 DEGREES
EKG T AXIS: 2 DEGREES
EKG VENTRICULAR RATE: 72 BPM
EOSINOPHIL # BLD: 1.3 %
EOSINOPHILS ABSOLUTE: 0.1 THOU/MM3 (ref 0–0.4)
ERYTHROCYTE [DISTWIDTH] IN BLOOD BY AUTOMATED COUNT: 12.3 % (ref 11.5–14.5)
ERYTHROCYTE [DISTWIDTH] IN BLOOD BY AUTOMATED COUNT: 38.9 FL (ref 35–45)
GFR SERPL CREATININE-BSD FRML MDRD: > 90 ML/MIN/1.73M2
GLUCOSE BLD-MCNC: 128 MG/DL (ref 70–108)
HCT VFR BLD CALC: 42.3 % (ref 42–52)
HEMOGLOBIN: 14.4 GM/DL (ref 14–18)
IMMATURE GRANS (ABS): 0.02 THOU/MM3 (ref 0–0.07)
IMMATURE GRANULOCYTES: 0.4 %
LIPASE: 30 U/L (ref 5.6–51.3)
LYMPHOCYTES # BLD: 34.4 %
LYMPHOCYTES ABSOLUTE: 1.6 THOU/MM3 (ref 1–4.8)
MCH RBC QN AUTO: 29.7 PG (ref 26–33)
MCHC RBC AUTO-ENTMCNC: 34 GM/DL (ref 32.2–35.5)
MCV RBC AUTO: 87.2 FL (ref 80–94)
MONOCYTES # BLD: 8 %
MONOCYTES ABSOLUTE: 0.4 THOU/MM3 (ref 0.4–1.3)
NUCLEATED RED BLOOD CELLS: 0 /100 WBC
OSMOLALITY CALCULATION: 267.1 MOSMOL/KG (ref 275–300)
PLATELET # BLD: 261 THOU/MM3 (ref 130–400)
PMV BLD AUTO: 9 FL (ref 9.4–12.4)
POTASSIUM SERPL-SCNC: 3.6 MEQ/L (ref 3.5–5.2)
PROCALCITONIN: 0.07 NG/ML (ref 0.01–0.09)
RBC # BLD: 4.85 MILL/MM3 (ref 4.7–6.1)
SEG NEUTROPHILS: 55.3 %
SEGMENTED NEUTROPHILS ABSOLUTE COUNT: 2.6 THOU/MM3 (ref 1.8–7.7)
SODIUM BLD-SCNC: 134 MEQ/L (ref 135–145)
TOTAL PROTEIN: 7.3 G/DL (ref 6.1–8)
TROPONIN T: < 0.01 NG/ML
WBC # BLD: 4.7 THOU/MM3 (ref 4.8–10.8)

## 2018-12-03 PROCEDURE — 93010 ELECTROCARDIOGRAM REPORT: CPT | Performed by: NUCLEAR MEDICINE

## 2018-12-03 PROCEDURE — 36415 COLL VENOUS BLD VENIPUNCTURE: CPT

## 2018-12-03 PROCEDURE — 85025 COMPLETE CBC W/AUTO DIFF WBC: CPT

## 2018-12-03 PROCEDURE — 71275 CT ANGIOGRAPHY CHEST: CPT

## 2018-12-03 PROCEDURE — 84484 ASSAY OF TROPONIN QUANT: CPT

## 2018-12-03 PROCEDURE — 80053 COMPREHEN METABOLIC PANEL: CPT

## 2018-12-03 PROCEDURE — 6370000000 HC RX 637 (ALT 250 FOR IP): Performed by: PHYSICIAN ASSISTANT

## 2018-12-03 PROCEDURE — 2709999900 HC NON-CHARGEABLE SUPPLY

## 2018-12-03 PROCEDURE — 83690 ASSAY OF LIPASE: CPT

## 2018-12-03 PROCEDURE — 2580000003 HC RX 258: Performed by: PHYSICIAN ASSISTANT

## 2018-12-03 PROCEDURE — 6360000004 HC RX CONTRAST MEDICATION: Performed by: PHYSICIAN ASSISTANT

## 2018-12-03 PROCEDURE — 71046 X-RAY EXAM CHEST 2 VIEWS: CPT

## 2018-12-03 PROCEDURE — 99285 EMERGENCY DEPT VISIT HI MDM: CPT

## 2018-12-03 PROCEDURE — 84145 PROCALCITONIN (PCT): CPT

## 2018-12-03 PROCEDURE — 86140 C-REACTIVE PROTEIN: CPT

## 2018-12-03 PROCEDURE — 93005 ELECTROCARDIOGRAM TRACING: CPT | Performed by: EMERGENCY MEDICINE

## 2018-12-03 RX ORDER — 0.9 % SODIUM CHLORIDE 0.9 %
1000 INTRAVENOUS SOLUTION INTRAVENOUS ONCE
Status: COMPLETED | OUTPATIENT
Start: 2018-12-03 | End: 2018-12-03

## 2018-12-03 RX ORDER — ALBUTEROL SULFATE 90 UG/1
2 AEROSOL, METERED RESPIRATORY (INHALATION) ONCE
Status: COMPLETED | OUTPATIENT
Start: 2018-12-03 | End: 2018-12-03

## 2018-12-03 RX ADMIN — SODIUM CHLORIDE 1000 ML: 9 INJECTION, SOLUTION INTRAVENOUS at 06:36

## 2018-12-03 RX ADMIN — IOPAMIDOL 80 ML: 755 INJECTION, SOLUTION INTRAVENOUS at 07:25

## 2018-12-03 RX ADMIN — ALBUTEROL SULFATE 2 PUFF: 90 AEROSOL, METERED RESPIRATORY (INHALATION) at 06:41

## 2018-12-03 ASSESSMENT — ENCOUNTER SYMPTOMS
DIARRHEA: 0
NAUSEA: 1
ABDOMINAL PAIN: 0
SHORTNESS OF BREATH: 1
EYE DISCHARGE: 0
VOMITING: 0
EYE REDNESS: 0
RHINORRHEA: 0
WHEEZING: 0
COUGH: 1
SORE THROAT: 0
BACK PAIN: 1

## 2018-12-03 ASSESSMENT — PAIN DESCRIPTION - LOCATION: LOCATION: BACK;ANKLE

## 2018-12-03 ASSESSMENT — PAIN SCALES - GENERAL: PAINLEVEL_OUTOF10: 4

## 2018-12-03 ASSESSMENT — PAIN DESCRIPTION - ORIENTATION: ORIENTATION: MID

## 2018-12-03 NOTE — ED TRIAGE NOTES
Pt comes to the ED with c/o back pain that started suddenly while sitting down. Pt states that he is having epigastric pain as well. Pt denies SOB currently but states that when the pain started he was. EKG in process. Pt's respirations are even and unlabored. Will continue to monitor.

## 2018-12-03 NOTE — ED PROVIDER NOTES
(age of onset: 52) in his mother; High Blood Pressure in an other family member; Prostate Cancer in his paternal grandfather. SOCIAL HISTORY    reports that he quit smoking about 4 years ago. His smoking use included Cigarettes. He has a 9.00 pack-year smoking history. His smokeless tobacco use includes Chew. He reports that he does not drink alcohol or use drugs. PHYSICAL EXAM     INITIAL VITALS:  height is 5' 9\" (1.753 m) and weight is 220 lb (99.8 kg). His oral temperature is 97.7 °F (36.5 °C). His blood pressure is 142/83 (abnormal) and his pulse is 58. His respiration is 15 and oxygen saturation is 98%. Physical Exam   Constitutional: He is oriented to person, place, and time. Vital signs are normal. He appears well-developed and well-nourished. Non-toxic appearance. No distress. HENT:   Head: Normocephalic and atraumatic. Right Ear: Hearing normal.   Left Ear: Hearing normal.   Nose: Nose normal. No rhinorrhea. Mouth/Throat: Uvula is midline, oropharynx is clear and moist and mucous membranes are normal. No oropharyngeal exudate. Eyes: Pupils are equal, round, and reactive to light. Conjunctivae, EOM and lids are normal. No scleral icterus. Neck: Normal range of motion. Neck supple. No neck rigidity. No tracheal deviation present. Cardiovascular: Normal rate, regular rhythm and normal heart sounds. No murmur heard. Pulmonary/Chest: Effort normal and breath sounds normal. No stridor. No respiratory distress. He has no decreased breath sounds. He has no wheezes. Abdominal: Soft. He exhibits no distension. There is no tenderness. There is no rigidity and no guarding. Musculoskeletal: Normal range of motion. He exhibits no edema. Mid back tenderness. No signs of DVT. Lymphadenopathy:     He has no cervical adenopathy. Neurological: He is alert and oriented to person, place, and time. He has normal strength. Gait normal. GCS eye subscore is 4. GCS verbal subscore is 5.  GCS motor

## 2019-07-29 NOTE — PROGRESS NOTES
healthy behaviors: nutrition, exercise, medication adherence and tobacco cessation    Patient given educational materials on: See Attached    I have instructed Timmy Flynn to complete a self tracking handout on Blood Sugars  and tobacco use and instructed them to bring it with them to his next appointment. Barriers to learning and self management: none    Discussed use, benefit, and side effects of prescribed medications. Barriers to medication compliance addressed. All patient questions answered. Pt voiced understanding.        Electronically signed by Edith Corrales DO on 7/30/2019 at 8:02 AM

## 2019-07-30 ENCOUNTER — NURSE ONLY (OUTPATIENT)
Dept: LAB | Age: 34
End: 2019-07-30

## 2019-07-30 ENCOUNTER — OFFICE VISIT (OUTPATIENT)
Dept: FAMILY MEDICINE CLINIC | Age: 34
End: 2019-07-30
Payer: COMMERCIAL

## 2019-07-30 VITALS
DIASTOLIC BLOOD PRESSURE: 78 MMHG | TEMPERATURE: 97.5 F | SYSTOLIC BLOOD PRESSURE: 132 MMHG | BODY MASS INDEX: 33.33 KG/M2 | WEIGHT: 225 LBS | HEIGHT: 69 IN | RESPIRATION RATE: 14 BRPM | HEART RATE: 62 BPM

## 2019-07-30 DIAGNOSIS — I10 ESSENTIAL HYPERTENSION: ICD-10-CM

## 2019-07-30 DIAGNOSIS — F17.220 CHEWING TOBACCO NICOTINE DEPENDENCE WITHOUT COMPLICATION: ICD-10-CM

## 2019-07-30 DIAGNOSIS — M54.2 CERVICALGIA: ICD-10-CM

## 2019-07-30 DIAGNOSIS — K58.0 IRRITABLE BOWEL SYNDROME WITH DIARRHEA: ICD-10-CM

## 2019-07-30 DIAGNOSIS — I10 ESSENTIAL HYPERTENSION: Primary | ICD-10-CM

## 2019-07-30 DIAGNOSIS — K76.0 NAFLD (NONALCOHOLIC FATTY LIVER DISEASE): ICD-10-CM

## 2019-07-30 DIAGNOSIS — M54.50 CHRONIC BILATERAL LOW BACK PAIN WITHOUT SCIATICA: ICD-10-CM

## 2019-07-30 DIAGNOSIS — M25.50 POLYARTHRALGIA: ICD-10-CM

## 2019-07-30 DIAGNOSIS — G89.29 CHRONIC BILATERAL LOW BACK PAIN WITHOUT SCIATICA: ICD-10-CM

## 2019-07-30 DIAGNOSIS — K21.9 GASTROESOPHAGEAL REFLUX DISEASE, ESOPHAGITIS PRESENCE NOT SPECIFIED: ICD-10-CM

## 2019-07-30 LAB
ALBUMIN SERPL-MCNC: 4.5 G/DL (ref 3.5–5.1)
ALP BLD-CCNC: 69 U/L (ref 38–126)
ALT SERPL-CCNC: 30 U/L (ref 11–66)
ANION GAP SERPL CALCULATED.3IONS-SCNC: 12 MEQ/L (ref 8–16)
AST SERPL-CCNC: 22 U/L (ref 5–40)
BASOPHILS # BLD: 0.4 %
BASOPHILS ABSOLUTE: 0 THOU/MM3 (ref 0–0.1)
BILIRUB SERPL-MCNC: 0.5 MG/DL (ref 0.3–1.2)
BUN BLDV-MCNC: 9 MG/DL (ref 7–22)
C-REACTIVE PROTEIN: 0.04 MG/DL (ref 0–1)
CALCIUM SERPL-MCNC: 9.6 MG/DL (ref 8.5–10.5)
CHLORIDE BLD-SCNC: 104 MEQ/L (ref 98–111)
CHOLESTEROL, TOTAL: 153 MG/DL (ref 100–199)
CO2: 24 MEQ/L (ref 23–33)
CREAT SERPL-MCNC: 0.8 MG/DL (ref 0.4–1.2)
CREATININE, URINE: 453.7 MG/DL
EOSINOPHIL # BLD: 1.6 %
EOSINOPHILS ABSOLUTE: 0.1 THOU/MM3 (ref 0–0.4)
ERYTHROCYTE [DISTWIDTH] IN BLOOD BY AUTOMATED COUNT: 12.8 % (ref 11.5–14.5)
ERYTHROCYTE [DISTWIDTH] IN BLOOD BY AUTOMATED COUNT: 41.8 FL (ref 35–45)
GFR SERPL CREATININE-BSD FRML MDRD: > 90 ML/MIN/1.73M2
GLUCOSE BLD-MCNC: 107 MG/DL (ref 70–108)
HCT VFR BLD CALC: 46.7 % (ref 42–52)
HDLC SERPL-MCNC: 39 MG/DL
HEMOGLOBIN: 15.8 GM/DL (ref 14–18)
IMMATURE GRANS (ABS): 0.02 THOU/MM3 (ref 0–0.07)
IMMATURE GRANULOCYTES: 0.4 %
LDL CHOLESTEROL CALCULATED: 91 MG/DL
LYMPHOCYTES # BLD: 39.1 %
LYMPHOCYTES ABSOLUTE: 2.2 THOU/MM3 (ref 1–4.8)
MCH RBC QN AUTO: 30.3 PG (ref 26–33)
MCHC RBC AUTO-ENTMCNC: 33.8 GM/DL (ref 32.2–35.5)
MCV RBC AUTO: 89.5 FL (ref 80–94)
MICROALBUMIN UR-MCNC: < 1.2 MG/DL
MICROALBUMIN/CREAT UR-RTO: 3 MG/G (ref 0–30)
MONOCYTES # BLD: 7.2 %
MONOCYTES ABSOLUTE: 0.4 THOU/MM3 (ref 0.4–1.3)
NUCLEATED RED BLOOD CELLS: 0 /100 WBC
PLATELET # BLD: 242 THOU/MM3 (ref 130–400)
PMV BLD AUTO: 9.7 FL (ref 9.4–12.4)
POTASSIUM SERPL-SCNC: 4.1 MEQ/L (ref 3.5–5.2)
RBC # BLD: 5.22 MILL/MM3 (ref 4.7–6.1)
RHEUMATOID FACTOR: 79 IU/ML (ref 0–13)
SEDIMENTATION RATE, ERYTHROCYTE: 4 MM/HR (ref 0–10)
SEG NEUTROPHILS: 51.3 %
SEGMENTED NEUTROPHILS ABSOLUTE COUNT: 2.9 THOU/MM3 (ref 1.8–7.7)
SODIUM BLD-SCNC: 140 MEQ/L (ref 135–145)
TOTAL PROTEIN: 7.2 G/DL (ref 6.1–8)
TRIGL SERPL-MCNC: 113 MG/DL (ref 0–199)
TSH SERPL DL<=0.05 MIU/L-ACNC: 2.88 UIU/ML (ref 0.4–4.2)
URIC ACID: 7.6 MG/DL (ref 3.7–7)
WBC # BLD: 5.6 THOU/MM3 (ref 4.8–10.8)

## 2019-07-30 PROCEDURE — 99214 OFFICE O/P EST MOD 30 MIN: CPT | Performed by: FAMILY MEDICINE

## 2019-07-30 PROCEDURE — G8427 DOCREV CUR MEDS BY ELIG CLIN: HCPCS | Performed by: FAMILY MEDICINE

## 2019-07-30 PROCEDURE — G8417 CALC BMI ABV UP PARAM F/U: HCPCS | Performed by: FAMILY MEDICINE

## 2019-07-30 PROCEDURE — 4004F PT TOBACCO SCREEN RCVD TLK: CPT | Performed by: FAMILY MEDICINE

## 2019-07-30 RX ORDER — OMEPRAZOLE 20 MG/1
20 CAPSULE, DELAYED RELEASE ORAL DAILY
Qty: 90 CAPSULE | Refills: 3 | Status: SHIPPED | OUTPATIENT
Start: 2019-07-30 | End: 2020-08-13 | Stop reason: SDUPTHER

## 2019-07-30 RX ORDER — LISINOPRIL AND HYDROCHLOROTHIAZIDE 20; 12.5 MG/1; MG/1
1 TABLET ORAL DAILY
Qty: 90 TABLET | Refills: 3 | Status: SHIPPED | OUTPATIENT
Start: 2019-07-30 | End: 2020-08-13 | Stop reason: SDUPTHER

## 2019-07-30 ASSESSMENT — PATIENT HEALTH QUESTIONNAIRE - PHQ9
SUM OF ALL RESPONSES TO PHQ9 QUESTIONS 1 & 2: 0
SUM OF ALL RESPONSES TO PHQ QUESTIONS 1-9: 0
1. LITTLE INTEREST OR PLEASURE IN DOING THINGS: 0
SUM OF ALL RESPONSES TO PHQ QUESTIONS 1-9: 0
2. FEELING DOWN, DEPRESSED OR HOPELESS: 0

## 2019-07-30 NOTE — PATIENT INSTRUCTIONS
bleeding. · See your doctor regularly. You may need to see the doctor more often at first or until your blood pressure comes down. · If you are taking blood pressure medicine, talk to your doctor before you take decongestants or anti-inflammatory medicine, such as ibuprofen. Some of these medicines can raise blood pressure. · Learn how to check your blood pressure at home. Lifestyle changes  · Stay at a healthy weight. This is especially important if you put on weight around the waist. Losing even 10 pounds can help you lower your blood pressure. · If your doctor recommends it, get more exercise. Walking is a good choice. Bit by bit, increase the amount you walk every day. Try for at least 30 minutes on most days of the week. You also may want to swim, bike, or do other activities. · Avoid or limit alcohol. Talk to your doctor about whether you can drink any alcohol. · Try to limit how much sodium you eat to less than 2,300 milligrams (mg) a day. Your doctor may ask you to try to eat less than 1,500 mg a day. · Eat plenty of fruits (such as bananas and oranges), vegetables, legumes, whole grains, and low-fat dairy products. · Lower the amount of saturated fat in your diet. Saturated fat is found in animal products such as milk, cheese, and meat. Limiting these foods may help you lose weight and also lower your risk for heart disease. · Do not smoke. Smoking increases your risk for heart attack and stroke. If you need help quitting, talk to your doctor about stop-smoking programs and medicines. These can increase your chances of quitting for good. When should you call for help? Call 911 anytime you think you may need emergency care. This may mean having symptoms that suggest that your blood pressure is causing a serious heart or blood vessel problem. Your blood pressure may be over 180/120.   For example, call 911 if:    · You have symptoms of a heart attack. These may include:  ?  Chest pain or pressure, your healthcare professional. [place] [address] any warranty or liability for your use of this information. Patient Education        Stopping Smokeless Tobacco Use: Care Instructions  Your Care Instructions    Smokeless tobacco comes in many forms, such as snuff and chewing tobacco:  · Snuff is finely ground tobacco sold in cans or pouches. Most of the time, snuff is used by putting a \"pinch\" or \"dip\" between the lower lip or cheek and the gum. · Chewing tobacco is sold as loose leaves, plugs, or twists. It is chewed or placed between the cheek and the gum or teeth. There are plenty of reasons to stop using smokeless tobacco. These products are harmful. They are not risk-free alternatives to smoking. Smokeless tobacco contains nicotine, which is addicting. Though using smokeless tobacco is less harmful than smoking cigarettes, it can cause serious health problems, such as:  · White patches or red sores in your mouth that can turn into mouth cancer involving the lip, tongue, or cheek. · Tooth loss and other dental problems. · Gum disease. Your gums may pull away from your teeth and not grow back. People who use smokeless tobacco crave the nicotine in it. Giving up smokeless tobacco is much harder than simply changing a habit. Your body has to stop craving the nicotine. It is hard to quit, but you can do it. Many tools are available for people who want to quit using smokeless tobacco. You may find that combining tools works best for you. There are several steps to quitting. First you get ready to quit. Then you get support to help you. After that, you learn new skills and behaviors to quit. For many people, a necessary step is getting and using medicine. Your doctor will help you set up the plan that best meets your needs. You may want to attend a tobacco cessation program. When you choose a program, look for one that has proven success. Ask your doctor for ideas.  You will greatly a meal in a different place. · Cut down on stress. Calm yourself or release tension by doing an activity you enjoy, such as reading a book, taking a hot bath, or gardening. · Talk to your doctor or pharmacist about nicotine replacement therapy. You still get nicotine, but you do not use tobacco. Nicotine replacement products help you slowly reduce the amount of nicotine you need. Many of these products are available over the counter. They include nicotine patches, gum, lozenges, and inhalers. · Ask your doctor about bupropion (Wellbutrin) or varenicline (Chantix), which are prescription medicines. They do not contain nicotine. They help you by reducing withdrawal symptoms, such as stress and anxiety. · Get regular exercise. Having healthy habits will help your body move past its craving for nicotine. · Be prepared to keep trying. Most people are not successful the first few times they try to quit. Do not get mad at yourself if you use tobacco again. Make a list of things you learned, and think about when you want to try again, such as next week, next month, or next year. Where can you learn more? Go to https://[employer].Appwapp. org and sign in to your [employer] account. Enter W825 in the [facility] box to learn more about \"Stopping Smokeless Tobacco Use: Care Instructions. \"     If you do not have an account, please click on the \"Sign Up Now\" link. Current as of: September 26, 2018  Content Version: 12.0  © 9274-8443 [employer]. Care instructions adapted under license by [facility] ([facility]). If you have questions about a medical condition or this instruction, always ask your healthcare professional. [place] [address] any warranty or liability for your use of this information.

## 2019-08-01 LAB
ANA SCREEN: NORMAL
CYCLIC CITRULLIN PEPTIDE AB: 3 UNITS (ref 0–19)

## 2019-08-02 ENCOUNTER — TELEPHONE (OUTPATIENT)
Dept: FAMILY MEDICINE CLINIC | Age: 34
End: 2019-08-02

## 2019-08-02 DIAGNOSIS — R74.8 ABNORMAL TRANSAMINASES: ICD-10-CM

## 2019-08-02 DIAGNOSIS — R73.9 HYPERGLYCEMIA: Primary | ICD-10-CM

## 2019-08-02 DIAGNOSIS — E87.1 HYPONATREMIA: ICD-10-CM

## 2019-08-06 ENCOUNTER — NURSE ONLY (OUTPATIENT)
Dept: LAB | Age: 34
End: 2019-08-06

## 2019-08-06 DIAGNOSIS — R73.9 HYPERGLYCEMIA: ICD-10-CM

## 2019-08-06 DIAGNOSIS — E87.1 HYPONATREMIA: ICD-10-CM

## 2019-08-06 DIAGNOSIS — R74.8 ABNORMAL TRANSAMINASES: ICD-10-CM

## 2019-08-06 LAB
ALBUMIN SERPL-MCNC: 4.5 G/DL (ref 3.5–5.1)
ALP BLD-CCNC: 73 U/L (ref 38–126)
ALT SERPL-CCNC: 45 U/L (ref 11–66)
ANION GAP SERPL CALCULATED.3IONS-SCNC: 12 MEQ/L (ref 8–16)
AST SERPL-CCNC: 29 U/L (ref 5–40)
AVERAGE GLUCOSE: 96 MG/DL (ref 70–126)
BILIRUB SERPL-MCNC: 0.7 MG/DL (ref 0.3–1.2)
BUN BLDV-MCNC: 7 MG/DL (ref 7–22)
CALCIUM SERPL-MCNC: 9.6 MG/DL (ref 8.5–10.5)
CHLORIDE BLD-SCNC: 101 MEQ/L (ref 98–111)
CO2: 26 MEQ/L (ref 23–33)
CREAT SERPL-MCNC: 0.8 MG/DL (ref 0.4–1.2)
GFR SERPL CREATININE-BSD FRML MDRD: > 90 ML/MIN/1.73M2
GLUCOSE BLD-MCNC: 93 MG/DL (ref 70–108)
HBA1C MFR BLD: 5.2 % (ref 4.4–6.4)
POTASSIUM SERPL-SCNC: 4.7 MEQ/L (ref 3.5–5.2)
SODIUM BLD-SCNC: 139 MEQ/L (ref 135–145)
TOTAL PROTEIN: 7.3 G/DL (ref 6.1–8)

## 2019-08-07 ENCOUNTER — TELEPHONE (OUTPATIENT)
Dept: FAMILY MEDICINE CLINIC | Age: 34
End: 2019-08-07

## 2019-08-08 ENCOUNTER — HOSPITAL ENCOUNTER (OUTPATIENT)
Dept: GENERAL RADIOLOGY | Age: 34
Discharge: HOME | End: 2019-08-08
Payer: COMMERCIAL

## 2019-08-08 ENCOUNTER — HOSPITAL ENCOUNTER (OUTPATIENT)
Age: 34
Discharge: HOME | End: 2019-08-08
Payer: COMMERCIAL

## 2019-08-08 DIAGNOSIS — M54.50 CHRONIC BILATERAL LOW BACK PAIN WITHOUT SCIATICA: ICD-10-CM

## 2019-08-08 DIAGNOSIS — M25.50 POLYARTHRALGIA: ICD-10-CM

## 2019-08-08 DIAGNOSIS — G89.29 CHRONIC BILATERAL LOW BACK PAIN WITHOUT SCIATICA: ICD-10-CM

## 2019-08-08 DIAGNOSIS — M54.2 CERVICALGIA: ICD-10-CM

## 2019-08-08 PROCEDURE — 73610 X-RAY EXAM OF ANKLE: CPT

## 2019-08-08 PROCEDURE — 73130 X-RAY EXAM OF HAND: CPT

## 2019-08-08 PROCEDURE — 73562 X-RAY EXAM OF KNEE 3: CPT

## 2019-08-08 PROCEDURE — 73630 X-RAY EXAM OF FOOT: CPT

## 2019-08-08 PROCEDURE — 72100 X-RAY EXAM L-S SPINE 2/3 VWS: CPT

## 2019-08-08 PROCEDURE — 72050 X-RAY EXAM NECK SPINE 4/5VWS: CPT

## 2019-08-09 ENCOUNTER — TELEPHONE (OUTPATIENT)
Dept: FAMILY MEDICINE CLINIC | Age: 34
End: 2019-08-09

## 2019-09-10 ENCOUNTER — OFFICE VISIT (OUTPATIENT)
Dept: FAMILY MEDICINE CLINIC | Age: 34
End: 2019-09-10
Payer: COMMERCIAL

## 2019-09-10 VITALS
RESPIRATION RATE: 10 BRPM | TEMPERATURE: 97.9 F | BODY MASS INDEX: 32.07 KG/M2 | DIASTOLIC BLOOD PRESSURE: 93 MMHG | SYSTOLIC BLOOD PRESSURE: 136 MMHG | HEIGHT: 70 IN | WEIGHT: 224 LBS | HEART RATE: 62 BPM

## 2019-09-10 DIAGNOSIS — F17.220 CHEWING TOBACCO NICOTINE DEPENDENCE WITHOUT COMPLICATION: Chronic | ICD-10-CM

## 2019-09-10 DIAGNOSIS — I10 ESSENTIAL HYPERTENSION: Chronic | ICD-10-CM

## 2019-09-10 DIAGNOSIS — M25.50 POLYARTHRALGIA: Primary | ICD-10-CM

## 2019-09-10 DIAGNOSIS — R76.8 RHEUMATOID FACTOR POSITIVE WITH CYCLIC CITRULLINATED PEPTIDE (CCP) ANTIBODY NEGATIVE: ICD-10-CM

## 2019-09-10 DIAGNOSIS — E66.9 OBESITY (BMI 30-39.9): Chronic | ICD-10-CM

## 2019-09-10 PROCEDURE — G8427 DOCREV CUR MEDS BY ELIG CLIN: HCPCS | Performed by: FAMILY MEDICINE

## 2019-09-10 PROCEDURE — 99214 OFFICE O/P EST MOD 30 MIN: CPT | Performed by: FAMILY MEDICINE

## 2019-09-10 PROCEDURE — 4004F PT TOBACCO SCREEN RCVD TLK: CPT | Performed by: FAMILY MEDICINE

## 2019-09-10 PROCEDURE — G8417 CALC BMI ABV UP PARAM F/U: HCPCS | Performed by: FAMILY MEDICINE

## 2019-09-10 RX ORDER — MELOXICAM 15 MG/1
15 TABLET ORAL DAILY PRN
Qty: 30 TABLET | Refills: 3 | Status: SHIPPED | OUTPATIENT
Start: 2019-09-10 | End: 2020-12-18 | Stop reason: SDUPTHER

## 2019-09-10 NOTE — PATIENT INSTRUCTIONS
Patient Education        Joint Pain: Care Instructions  Your Care Instructions    Many people have small aches and pains from overuse or injury to muscles and joints. Joint injuries often happen during sports or recreation, work tasks, or projects around the home. An overuse injury can happen when you put too much stress on a joint or when you do an activity that stresses the joint over and over, such as using the computer or rowing a boat. You can take action at home to help your muscles and joints get better. You should feel better in 1 to 2 weeks, but it can take 3 months or more to heal completely. Follow-up care is a key part of your treatment and safety. Be sure to make and go to all appointments, and call your doctor if you are having problems. It's also a good idea to know your test results and keep a list of the medicines you take. How can you care for yourself at home? · Do not put weight on the injured joint for at least a day or two. · For the first day or two after an injury, do not take hot showers or baths, and do not use hot packs. The heat could make swelling worse. · Put ice or a cold pack on the sore joint for 10 to 20 minutes at a time. Try to do this every 1 to 2 hours for the next 3 days (when you are awake) or until the swelling goes down. Put a thin cloth between the ice and your skin. · Wrap the injury in an elastic bandage. Do not wrap it too tightly because this can cause more swelling. · Prop up the sore joint on a pillow when you ice it or anytime you sit or lie down during the next 3 days. Try to keep it above the level of your heart. This will help reduce swelling. · Take an over-the-counter pain medicine, such as acetaminophen (Tylenol), ibuprofen (Advil, Motrin), or naproxen (Aleve). Read and follow all instructions on the label. · After 1 or 2 days of rest, begin moving the joint gently.  While the joint is still healing, you can begin to exercise using activities that do mouth cancer involving the lip, tongue, or cheek. · Tooth loss and other dental problems. · Gum disease. Your gums may pull away from your teeth and not grow back. People who use smokeless tobacco crave the nicotine in it. Giving up smokeless tobacco is much harder than simply changing a habit. Your body has to stop craving the nicotine. It is hard to quit, but you can do it. Many tools are available for people who want to quit using smokeless tobacco. You may find that combining tools works best for you. There are several steps to quitting. First you get ready to quit. Then you get support to help you. After that, you learn new skills and behaviors to quit. For many people, a necessary step is getting and using medicine. Your doctor will help you set up the plan that best meets your needs. You may want to attend a tobacco cessation program. When you choose a program, look for one that has proven success. Ask your doctor for ideas. You will greatly increase your chances of success if you take medicine as well as get counseling or join a cessation program.  Some of the changes you feel when you first quit smokeless tobacco are uncomfortable. Your body will miss the nicotine at first, and you may feel short-tempered and grumpy. You may have trouble sleeping or concentrating. Medicine can help you deal with these symptoms. You may struggle with changing your habits and rituals. The last step is the tricky one: Be prepared for the urge to use smokeless tobacco to continue for a time. This is a lot to deal with, but keep at it. You will feel better. Follow-up care is a key part of your treatment and safety. Be sure to make and go to all appointments, and call your doctor if you are having problems. It's also a good idea to know your test results and keep a list of the medicines you take. How can you care for yourself at home? · Ask your family, friends, and coworkers for support.  You have a better chance of list of things you learned, and think about when you want to try again, such as next week, next month, or next year. Where can you learn more? Go to https://Fighterspepicewanabel.99Presents. org and sign in to your [employer] account. Enter C039 in the [facility] box to learn more about \"Stopping Smokeless Tobacco Use: Care Instructions. \"     If you do not have an account, please click on the \"Sign Up Now\" link. Current as of: September 26, 2018  Content Version: 12.1  © 5182-4633 Healthwise, Incorporated. Care instructions adapted under license by [facility] ([facility]). If you have questions about a medical condition or this instruction, always ask your healthcare professional. [address] any warranty or liability for your use of this information.

## 2020-03-03 ENCOUNTER — HOSPITAL ENCOUNTER (OUTPATIENT)
Age: 35
Discharge: HOME | End: 2020-03-03
Payer: COMMERCIAL

## 2020-03-03 LAB
ALBUMIN SERPL-MCNC: 4.4 G/DL (ref 3.5–5.1)
ALP BLD-CCNC: 66 U/L (ref 38–126)
ALT SERPL-CCNC: 35 U/L (ref 11–66)
ANION GAP SERPL CALCULATED.3IONS-SCNC: 12 MEQ/L (ref 8–16)
AST SERPL-CCNC: 23 U/L (ref 5–40)
BASOPHILS # BLD: 0.9 %
BASOPHILS ABSOLUTE: 0 THOU/MM3 (ref 0–0.1)
BILIRUB SERPL-MCNC: 0.5 MG/DL (ref 0.3–1.2)
BUN BLDV-MCNC: 7 MG/DL (ref 7–22)
C-REACTIVE PROTEIN: 0.05 MG/DL (ref 0–1)
CALCIUM SERPL-MCNC: 9.4 MG/DL (ref 8.5–10.5)
CHLORIDE BLD-SCNC: 102 MEQ/L (ref 98–111)
CO2: 25 MEQ/L (ref 23–33)
CREAT SERPL-MCNC: 0.9 MG/DL (ref 0.4–1.2)
EOSINOPHIL # BLD: 1.5 %
EOSINOPHILS ABSOLUTE: 0.1 THOU/MM3 (ref 0–0.4)
ERYTHROCYTE [DISTWIDTH] IN BLOOD BY AUTOMATED COUNT: 12.8 % (ref 11.5–14.5)
ERYTHROCYTE [DISTWIDTH] IN BLOOD BY AUTOMATED COUNT: 41.6 FL (ref 35–45)
GFR SERPL CREATININE-BSD FRML MDRD: > 90 ML/MIN/1.73M2
GLUCOSE BLD-MCNC: 97 MG/DL (ref 70–108)
HBV SURFACE AB TITR SER: NEGATIVE {TITER}
HCT VFR BLD CALC: 48.1 % (ref 42–52)
HEMOGLOBIN: 16 GM/DL (ref 14–18)
HEPATITIS B SURFACE ANTIGEN: NEGATIVE
HEPATITIS C ANTIBODY: NEGATIVE
IMMATURE GRANS (ABS): 0.03 THOU/MM3 (ref 0–0.07)
IMMATURE GRANULOCYTES: 0.6 %
LYMPHOCYTES # BLD: 40.3 %
LYMPHOCYTES ABSOLUTE: 1.9 THOU/MM3 (ref 1–4.8)
MCH RBC QN AUTO: 30.1 PG (ref 26–33)
MCHC RBC AUTO-ENTMCNC: 33.3 GM/DL (ref 32.2–35.5)
MCV RBC AUTO: 90.4 FL (ref 80–94)
MONOCYTES # BLD: 10.4 %
MONOCYTES ABSOLUTE: 0.5 THOU/MM3 (ref 0.4–1.3)
NUCLEATED RED BLOOD CELLS: 0 /100 WBC
PLATELET # BLD: 253 THOU/MM3 (ref 130–400)
PMV BLD AUTO: 9.4 FL (ref 9.4–12.4)
POTASSIUM SERPL-SCNC: 4.3 MEQ/L (ref 3.5–5.2)
RBC # BLD: 5.32 MILL/MM3 (ref 4.7–6.1)
RHEUMATOID FACTOR: 83 IU/ML (ref 0–13)
SEDIMENTATION RATE, ERYTHROCYTE: 0 MM/HR (ref 0–10)
SEG NEUTROPHILS: 46.3 %
SEGMENTED NEUTROPHILS ABSOLUTE COUNT: 2.1 THOU/MM3 (ref 1.8–7.7)
SODIUM BLD-SCNC: 139 MEQ/L (ref 135–145)
TOTAL PROTEIN: 6.9 G/DL (ref 6.1–8)
URIC ACID: 7.2 MG/DL (ref 3.7–7)
WBC # BLD: 4.6 THOU/MM3 (ref 4.8–10.8)

## 2020-03-03 PROCEDURE — 87340 HEPATITIS B SURFACE AG IA: CPT

## 2020-03-03 PROCEDURE — 86430 RHEUMATOID FACTOR TEST QUAL: CPT

## 2020-03-03 PROCEDURE — 80053 COMPREHEN METABOLIC PANEL: CPT

## 2020-03-03 PROCEDURE — 86803 HEPATITIS C AB TEST: CPT

## 2020-03-03 PROCEDURE — 85025 COMPLETE CBC W/AUTO DIFF WBC: CPT

## 2020-03-03 PROCEDURE — 36415 COLL VENOUS BLD VENIPUNCTURE: CPT

## 2020-03-03 PROCEDURE — 84550 ASSAY OF BLOOD/URIC ACID: CPT

## 2020-03-03 PROCEDURE — 86704 HEP B CORE ANTIBODY TOTAL: CPT

## 2020-03-03 PROCEDURE — 86200 CCP ANTIBODY: CPT

## 2020-03-03 PROCEDURE — 86140 C-REACTIVE PROTEIN: CPT

## 2020-03-03 PROCEDURE — 86706 HEP B SURFACE ANTIBODY: CPT

## 2020-03-03 PROCEDURE — 85651 RBC SED RATE NONAUTOMATED: CPT

## 2020-03-05 LAB
CYCLIC CITRULLINATED PEPTIDE ANTIBODY IGG: < 1.5 U/ML
HEPATITIS B CORE TOTAL ANTIBODY: NEGATIVE

## 2020-05-18 ENCOUNTER — HOSPITAL ENCOUNTER (OUTPATIENT)
Dept: GENERAL RADIOLOGY | Age: 35
Discharge: HOME | End: 2020-05-18
Payer: COMMERCIAL

## 2020-05-18 ENCOUNTER — HOSPITAL ENCOUNTER (OUTPATIENT)
Age: 35
Discharge: HOME | End: 2020-05-18
Payer: COMMERCIAL

## 2020-05-18 LAB
ALBUMIN SERPL-MCNC: 4.6 G/DL (ref 3.5–5.1)
ALT SERPL-CCNC: 32 U/L (ref 11–66)
BASOPHILS # BLD: 0.7 %
BASOPHILS ABSOLUTE: 0.1 THOU/MM3 (ref 0–0.1)
CREAT SERPL-MCNC: 0.9 MG/DL (ref 0.4–1.2)
EOSINOPHIL # BLD: 1.2 %
EOSINOPHILS ABSOLUTE: 0.1 THOU/MM3 (ref 0–0.4)
ERYTHROCYTE [DISTWIDTH] IN BLOOD BY AUTOMATED COUNT: 13.9 % (ref 11.5–14.5)
ERYTHROCYTE [DISTWIDTH] IN BLOOD BY AUTOMATED COUNT: 47.6 FL (ref 35–45)
GFR SERPL CREATININE-BSD FRML MDRD: > 90 ML/MIN/1.73M2
HCT VFR BLD CALC: 51.2 % (ref 42–52)
HEMOGLOBIN: 17 GM/DL (ref 14–18)
IMMATURE GRANS (ABS): 0.08 THOU/MM3 (ref 0–0.07)
IMMATURE GRANULOCYTES: 1 %
LYMPHOCYTES # BLD: 27.1 %
LYMPHOCYTES ABSOLUTE: 2.2 THOU/MM3 (ref 1–4.8)
MCH RBC QN AUTO: 31.3 PG (ref 26–33)
MCHC RBC AUTO-ENTMCNC: 33.2 GM/DL (ref 32.2–35.5)
MCV RBC AUTO: 94.3 FL (ref 80–94)
MONOCYTES # BLD: 6.7 %
MONOCYTES ABSOLUTE: 0.5 THOU/MM3 (ref 0.4–1.3)
NUCLEATED RED BLOOD CELLS: 0 /100 WBC
PLATELET # BLD: 265 THOU/MM3 (ref 130–400)
PMV BLD AUTO: 10.1 FL (ref 9.4–12.4)
RBC # BLD: 5.43 MILL/MM3 (ref 4.7–6.1)
SEDIMENTATION RATE, ERYTHROCYTE: 1 MM/HR (ref 0–10)
SEG NEUTROPHILS: 63.3 %
SEGMENTED NEUTROPHILS ABSOLUTE COUNT: 5.1 THOU/MM3 (ref 1.8–7.7)
WBC # BLD: 8.1 THOU/MM3 (ref 4.8–10.8)

## 2020-05-18 PROCEDURE — 36415 COLL VENOUS BLD VENIPUNCTURE: CPT

## 2020-05-18 PROCEDURE — 85025 COMPLETE CBC W/AUTO DIFF WBC: CPT

## 2020-05-18 PROCEDURE — 82040 ASSAY OF SERUM ALBUMIN: CPT

## 2020-05-18 PROCEDURE — 85651 RBC SED RATE NONAUTOMATED: CPT

## 2020-05-18 PROCEDURE — 82565 ASSAY OF CREATININE: CPT

## 2020-05-18 PROCEDURE — 71046 X-RAY EXAM CHEST 2 VIEWS: CPT

## 2020-05-18 PROCEDURE — 84460 ALANINE AMINO (ALT) (SGPT): CPT

## 2020-06-26 ENCOUNTER — TELEPHONE (OUTPATIENT)
Dept: FAMILY MEDICINE CLINIC | Age: 35
End: 2020-06-26

## 2020-06-26 NOTE — LETTER
[address] [place] [address]. [facility] [address]  Phone: [phone]  Fax: [phone]          June 26, 2020    [id number] [place] [address]      Dear Rosalba First:    Shaq White is requesting fasting labs to be completed prior to your follow up appointment scheduled on 8/10/20. New lab orders enclosed. If you have any questions or concerns, please don't hesitate to call.     Sincerely,        Merari Camp., ELLEN([facility])

## 2020-08-05 ENCOUNTER — HOSPITAL ENCOUNTER (OUTPATIENT)
Age: 35
Discharge: HOME | End: 2020-08-05
Payer: COMMERCIAL

## 2020-08-05 DIAGNOSIS — I10 ESSENTIAL HYPERTENSION: Chronic | ICD-10-CM

## 2020-08-05 LAB
ALBUMIN SERPL-MCNC: 4.3 G/DL (ref 3.5–5.1)
ALT SERPL-CCNC: 29 U/L (ref 11–66)
BASOPHILS # BLD: 0.6 %
BASOPHILS ABSOLUTE: 0 THOU/MM3 (ref 0–0.1)
CHOLESTEROL, TOTAL: 158 MG/DL (ref 100–199)
CREAT SERPL-MCNC: 0.8 MG/DL (ref 0.4–1.2)
CREATININE, URINE: 287.1 MG/DL
EOSINOPHIL # BLD: 1.2 %
EOSINOPHILS ABSOLUTE: 0.1 THOU/MM3 (ref 0–0.4)
ERYTHROCYTE [DISTWIDTH] IN BLOOD BY AUTOMATED COUNT: 13.7 % (ref 11.5–14.5)
ERYTHROCYTE [DISTWIDTH] IN BLOOD BY AUTOMATED COUNT: 50.5 FL (ref 35–45)
GFR SERPL CREATININE-BSD FRML MDRD: > 90 ML/MIN/1.73M2
HCT VFR BLD CALC: 50.5 % (ref 42–52)
HDLC SERPL-MCNC: 31 MG/DL
HEMOGLOBIN: 16.5 GM/DL (ref 14–18)
IMMATURE GRANS (ABS): 0.04 THOU/MM3 (ref 0–0.07)
IMMATURE GRANULOCYTES: 0.6 %
LDL CHOLESTEROL CALCULATED: 112 MG/DL
LYMPHOCYTES # BLD: 23.4 %
LYMPHOCYTES ABSOLUTE: 1.5 THOU/MM3 (ref 1–4.8)
MCH RBC QN AUTO: 32.5 PG (ref 26–33)
MCHC RBC AUTO-ENTMCNC: 32.7 GM/DL (ref 32.2–35.5)
MCV RBC AUTO: 99.4 FL (ref 80–94)
MICROALBUMIN UR-MCNC: < 1.2 MG/DL
MICROALBUMIN/CREAT UR-RTO: 4 MG/G (ref 0–30)
MONOCYTES # BLD: 6.4 %
MONOCYTES ABSOLUTE: 0.4 THOU/MM3 (ref 0.4–1.3)
NUCLEATED RED BLOOD CELLS: 0 /100 WBC
PLATELET # BLD: 241 THOU/MM3 (ref 130–400)
PMV BLD AUTO: 10.3 FL (ref 9.4–12.4)
RBC # BLD: 5.08 MILL/MM3 (ref 4.7–6.1)
SEDIMENTATION RATE, ERYTHROCYTE: 1 MM/HR (ref 0–10)
SEG NEUTROPHILS: 67.8 %
SEGMENTED NEUTROPHILS ABSOLUTE COUNT: 4.5 THOU/MM3 (ref 1.8–7.7)
TRIGL SERPL-MCNC: 77 MG/DL (ref 0–199)
TSH SERPL DL<=0.05 MIU/L-ACNC: 1.42 UIU/ML (ref 0.4–4.2)
WBC # BLD: 6.6 THOU/MM3 (ref 4.8–10.8)

## 2020-08-05 PROCEDURE — 85025 COMPLETE CBC W/AUTO DIFF WBC: CPT

## 2020-08-05 PROCEDURE — 80061 LIPID PANEL: CPT

## 2020-08-05 PROCEDURE — 84460 ALANINE AMINO (ALT) (SGPT): CPT

## 2020-08-05 PROCEDURE — 82565 ASSAY OF CREATININE: CPT

## 2020-08-05 PROCEDURE — 82040 ASSAY OF SERUM ALBUMIN: CPT

## 2020-08-05 PROCEDURE — 82043 UR ALBUMIN QUANTITATIVE: CPT

## 2020-08-05 PROCEDURE — 36415 COLL VENOUS BLD VENIPUNCTURE: CPT

## 2020-08-05 PROCEDURE — 84443 ASSAY THYROID STIM HORMONE: CPT

## 2020-08-05 PROCEDURE — 85651 RBC SED RATE NONAUTOMATED: CPT

## 2020-08-06 ENCOUNTER — TELEPHONE (OUTPATIENT)
Dept: FAMILY MEDICINE CLINIC | Age: 35
End: 2020-08-06

## 2020-08-12 NOTE — PROGRESS NOTES
Component Value Date    LDLCALC 112 08/05/2020    [address] 91 07/30/2019    [address] 132 06/24/2017     No results found for: LABVLDL    DM Screen -   Lab Results   Component Value Date    GLUCOSE 97 03/03/2020     Lab Results   Component Value Date    LABA1C 5.2 08/06/2019       Colon Cancer Screening - Age 47    Tetanus - Declines July 2017/July 2019  Influenza Vaccine - Candidate FALL 2020  Pneumonia Vaccine - Age 71  Zoster - age 47     PSA testing discussion - Age 53  AAA Screening - Age 71; smoked      Current Outpatient Medications   Medication Sig Dispense Refill    Methotrexate, PF, 12.5 MG/0.4ML SOAJ Inject 12.5 mg into the skin once a week      Folic Acid 5 MG CAPS Take 5 mg by mouth daily      lisinopril-hydroCHLOROthiazide (PRINZIDE) 20-12.5 MG per tablet Take 1 tablet by mouth daily 90 tablet 3    omeprazole (PRILOSEC) 20 MG delayed release capsule Take 1 capsule by mouth daily 90 capsule 3    meloxicam (MOBIC) 15 MG tablet Take 1 tablet by mouth daily as needed for Pain 30 tablet 3     No current facility-administered medications for this visit. Orders Placed This Encounter   Medications    lisinopril-hydroCHLOROthiazide (PRINZIDE) 20-12.5 MG per tablet     Sig: Take 1 tablet by mouth daily     Dispense:  90 tablet     Refill:  3    omeprazole (PRILOSEC) 20 MG delayed release capsule     Sig: Take 1 capsule by mouth daily     Dispense:  90 capsule     Refill:  3         All medications reviewed and reconciled, including OTC and herbal medications. Updated list given to patient.        Patient Active Problem List   Diagnosis    History of asthma    Former smoker    Migraine headache    Hypertension    Dependence on nicotine from chewing tobacco    Eczema    GERD (gastroesophageal reflux disease)    Irritable bowel syndrome with diarrhea    NAFLD (nonalcoholic fatty liver disease)    Rheumatoid arthritis (HCC)    Cervicalgia    Chronic bilateral low back pain without sciatica    Obesity (BMI 30-39. 9)       Past Medical History:   Diagnosis Date    Dependence on nicotine from chewing tobacco 2015    Eczema 2015    Former smoker     GERD (gastroesophageal reflux disease)     History of asthma     Hypertension 2014    Irritable bowel syndrome with diarrhea     Migraine headache     NAFLD (nonalcoholic fatty liver disease)     Obesity (BMI 30-39. 9)     Rheumatoid arthritis ([facility] [address]        Past Surgical History:   Procedure Laterality Date    APPENDECTOMY  2013    COLONOSCOPY      FINGER SURGERY  10/17/15    left pinky finger    LAPAROSCOPIC APPENDECTOMY  2013    TONSILLECTOMY         No Known Allergies      Social History     Tobacco Use    Smoking status: Former Smoker     Packs/day: 0.75     Years: 12.00     Pack years: 9.00     Types: Cigarettes     Last attempt to quit: 10/23/2014     Years since quittin.8    Smokeless tobacco: Current User     Types: Chew   Substance Use Topics    Alcohol use: No     Comment: social       Family History   Problem Relation Age of Onset    Heart Disease Mother 52        MI    Early Death Father         MVA    Prostate Cancer Paternal Grandfather         unsure age   Gladystine Mower High Blood Pressure Other         Pt unsure if anyone in family does.  Colon Cancer Neg Hx          I have reviewed the patient's past medical history, past surgical history, allergies, medications, social and family history and I have made updates where appropriate.       Review of Systems  Positive responses are highlighted in bold    Constitutional:  Fever, Chills, Night Sweats, Fatigue, Unexpected changes in weight  HENT:  Ear pain, Tinnitus, Nosebleeds, Trouble swallowing, Hearing loss, Sore throat  Cardiovascular:  Chest Pain, Palpitations, Orthopnea, Paroxysmal Nocturnal Dyspnea  Respiratory:  Cough, Wheezing, Shortness of breath, Chest tightness, Apnea  Gastrointestinal:  Nausea, Vomiting, Diarrhea, Constipation, Heartburn, Blood in meds  Labs UTD    - lisinopril-hydroCHLOROthiazide (PRINZIDE) 20-12.5 MG per tablet; Take 1 tablet by mouth daily  Dispense: 90 tablet; Refill: 3    4. Gastroesophageal reflux disease, esophagitis presence not specified    Stable  Con;t PPI    - omeprazole (PRILOSEC) 20 MG delayed release capsule; Take 1 capsule by mouth daily  Dispense: 90 capsule; Refill: 3    5. NAFLD (nonalcoholic fatty liver disease)    Improved  Monitor labs  Wt loss    6. Irritable bowel syndrome with diarrhea    Stable off meds  Monitor  F/u GI prn    7. Chewing tobacco nicotine dependence without complication    In precontemplation stage and not ready to quit. Declines cessation, aware of risks of continued smoking as well as resources available to help quit. These include tobacco cessation classes as well as 1-800-QUIT NOW. No barriers other than lack of desire. 3+ min spent counseling. DISPOSITION    Return in about 1 year (around 8/13/2021) for follow-up on chronic medical conditions, sooner as needed. Sergio Koo released without restrictions. Future Appointments   Date Time Provider Tylor Reyes   8/13/2021  8:20 AM Floridalma Cadena [address] [place] [id number] received counseling on the following healthy behaviors: nutrition, exercise, medication adherence and tobacco cessation    Patient given educational materials on: See Attached    I have instructed Nalinikalani Hayes to complete a self tracking handout on Blood Sugars  and tobacco use and instructed them to bring it with them to his next appointment. Barriers to learning and self management: none    Discussed use, benefit, and side effects of prescribed medications. Barriers to medication compliance addressed. All patient questions answered. Pt voiced understanding.        Electronically signed by Floridalma Cadena DO on 8/13/2020 at 8:40 AM

## 2020-08-13 ENCOUNTER — OFFICE VISIT (OUTPATIENT)
Dept: FAMILY MEDICINE CLINIC | Age: 35
End: 2020-08-13
Payer: COMMERCIAL

## 2020-08-13 VITALS
WEIGHT: 215 LBS | RESPIRATION RATE: 12 BRPM | TEMPERATURE: 97.6 F | BODY MASS INDEX: 30.78 KG/M2 | DIASTOLIC BLOOD PRESSURE: 72 MMHG | SYSTOLIC BLOOD PRESSURE: 124 MMHG | HEIGHT: 70 IN | HEART RATE: 65 BPM

## 2020-08-13 PROCEDURE — 4004F PT TOBACCO SCREEN RCVD TLK: CPT | Performed by: FAMILY MEDICINE

## 2020-08-13 PROCEDURE — 99214 OFFICE O/P EST MOD 30 MIN: CPT | Performed by: FAMILY MEDICINE

## 2020-08-13 PROCEDURE — G8427 DOCREV CUR MEDS BY ELIG CLIN: HCPCS | Performed by: FAMILY MEDICINE

## 2020-08-13 PROCEDURE — G8417 CALC BMI ABV UP PARAM F/U: HCPCS | Performed by: FAMILY MEDICINE

## 2020-08-13 RX ORDER — OMEPRAZOLE 20 MG/1
20 CAPSULE, DELAYED RELEASE ORAL DAILY
Qty: 90 CAPSULE | Refills: 3 | Status: SHIPPED | OUTPATIENT
Start: 2020-08-13

## 2020-08-13 RX ORDER — FOLIC ACID 5 MG
5 CAPSULE ORAL EVERY OTHER DAY
COMMUNITY

## 2020-08-13 RX ORDER — LISINOPRIL AND HYDROCHLOROTHIAZIDE 20; 12.5 MG/1; MG/1
1 TABLET ORAL DAILY
Qty: 90 TABLET | Refills: 3 | Status: SHIPPED | OUTPATIENT
Start: 2020-08-13 | End: 2021-07-14 | Stop reason: SDUPTHER

## 2020-08-13 ASSESSMENT — PATIENT HEALTH QUESTIONNAIRE - PHQ9
2. FEELING DOWN, DEPRESSED OR HOPELESS: 0
1. LITTLE INTEREST OR PLEASURE IN DOING THINGS: 0
SUM OF ALL RESPONSES TO PHQ QUESTIONS 1-9: 0
SUM OF ALL RESPONSES TO PHQ9 QUESTIONS 1 & 2: 0
SUM OF ALL RESPONSES TO PHQ QUESTIONS 1-9: 0

## 2020-08-13 NOTE — PATIENT INSTRUCTIONS
Patient Education        High Blood Pressure: Care Instructions  Overview     It's normal for blood pressure to go up and down throughout the day. But if it stays up, you have high blood pressure. Another name for high blood pressure is hypertension. Despite what a lot of people think, high blood pressure usually doesn't cause headaches or make you feel dizzy or lightheaded. It usually has no symptoms. But it does increase your risk of stroke, heart attack, and other problems. You and your doctor will talk about your risks of these problems based on your blood pressure. Your doctor will give you a goal for your blood pressure. Your goal will be based on your health and your age. Lifestyle changes, such as eating healthy and being active, are always important to help lower blood pressure. You might also take medicine to reach your blood pressure goal.  Follow-up care is a key part of your treatment and safety. Be sure to make and go to all appointments, and call your doctor if you are having problems. It's also a good idea to know your test results and keep a list of the medicines you take. How can you care for yourself at home? Medical treatment  · If you stop taking your medicine, your blood pressure will go back up. You may take one or more types of medicine to lower your blood pressure. Be safe with medicines. Take your medicine exactly as prescribed. Call your doctor if you think you are having a problem with your medicine. · Talk to your doctor before you start taking aspirin every day. Aspirin can help certain people lower their risk of a heart attack or stroke. But taking aspirin isn't right for everyone, because it can cause serious bleeding. · See your doctor regularly. You may need to see the doctor more often at first or until your blood pressure comes down.   · If you are taking blood pressure medicine, talk to your doctor before you take decongestants or anti-inflammatory medicine, such as ibuprofen. Some of these medicines can raise blood pressure. · Learn how to check your blood pressure at home. Lifestyle changes  · Stay at a healthy weight. This is especially important if you put on weight around the waist. Losing even 10 pounds can help you lower your blood pressure. · If your doctor recommends it, get more exercise. Walking is a good choice. Bit by bit, increase the amount you walk every day. Try for at least 30 minutes on most days of the week. You also may want to swim, bike, or do other activities. · Avoid or limit alcohol. Talk to your doctor about whether you can drink any alcohol. · Try to limit how much sodium you eat to less than 2,300 milligrams (mg) a day. Your doctor may ask you to try to eat less than 1,500 mg a day. · Eat plenty of fruits (such as bananas and oranges), vegetables, legumes, whole grains, and low-fat dairy products. · Lower the amount of saturated fat in your diet. Saturated fat is found in animal products such as milk, cheese, and meat. Limiting these foods may help you lose weight and also lower your risk for heart disease. · Do not smoke. Smoking increases your risk for heart attack and stroke. If you need help quitting, talk to your doctor about stop-smoking programs and medicines. These can increase your chances of quitting for good. When should you call for help? Call  911 anytime you think you may need emergency care. This may mean having symptoms that suggest that your blood pressure is causing a serious heart or blood vessel problem. Your blood pressure may be over 180/120. For example, call 911 if:  · You have symptoms of a heart attack. These may include:  ? Chest pain or pressure, or a strange feeling in the chest.  ? Sweating. ? Shortness of breath. ? Nausea or vomiting. ? Pain, pressure, or a strange feeling in the back, neck, jaw, or upper belly or in one or both shoulders or arms. ? Lightheadedness or sudden weakness.   ? A fast or irregular heartbeat. · You have symptoms of a stroke. These may include:  ? Sudden numbness, tingling, weakness, or loss of movement in your face, arm, or leg, especially on only one side of your body. ? Sudden vision changes. ? Sudden trouble speaking. ? Sudden confusion or trouble understanding simple statements. ? Sudden problems with walking or balance. ? A sudden, severe headache that is different from past headaches. · You have severe back or belly pain. Do not wait until your blood pressure comes down on its own. Get help right away. Call your doctor now or seek immediate care if:  · Your blood pressure is much higher than normal (such as 180/120 or higher), but you don't have symptoms. · You think high blood pressure is causing symptoms, such as:  ? Severe headache.  ? Blurry vision. Watch closely for changes in your health, and be sure to contact your doctor if:  · Your blood pressure measures higher than your doctor recommends at least 2 times. That means the top number is higher or the bottom number is higher, or both. · You think you may be having side effects from your blood pressure medicine. Where can you learn more? Go to https://Trustribepepiceweb.[employer]. org and sign in to your [employer] account. Enter M524 in the [employer] box to learn more about \"High Blood Pressure: Care Instructions. \"     If you do not have an account, please click on the \"Sign Up Now\" link. Current as of: December 16, 2019               Content Version: 12.5  © 1263-8854 Healthwise, Incorporated. Care instructions adapted under license by [facility] [facility] ([facility]). If you have questions about a medical condition or this instruction, always ask your healthcare professional. [place] [address] any warranty or liability for your use of this information.          Patient Education        Stopping Smokeless Tobacco Use: Care Instructions  Your Care Instructions     Smokeless tobacco comes in many forms, such as snuff and chewing tobacco:  · Snuff is finely ground tobacco sold in cans or pouches. Most of the time, snuff is used by putting a \"pinch\" or \"dip\" between the lower lip or cheek and the gum. · Chewing tobacco is sold as loose leaves, plugs, or twists. It is chewed or placed between the cheek and the gum or teeth. There are plenty of reasons to stop using smokeless tobacco. These products are harmful. They are not risk-free alternatives to smoking. Smokeless tobacco contains nicotine, which is addicting. Though using smokeless tobacco is less harmful than smoking cigarettes, it can cause serious health problems, such as:  · White patches or red sores in your mouth that can turn into mouth cancer involving the lip, tongue, or cheek. · Tooth loss and other dental problems. · Gum disease. Your gums may pull away from your teeth and not grow back. People who use smokeless tobacco crave the nicotine in it. Giving up smokeless tobacco is much harder than simply changing a habit. Your body has to stop craving the nicotine. It is hard to quit, but you can do it. Many tools are available for people who want to quit using smokeless tobacco. You may find that combining tools works best for you. There are several steps to quitting. First you get ready to quit. Then you get support to help you. After that, you learn new skills and behaviors to quit. For many people, a necessary step is getting and using medicine. Your doctor will help you set up the plan that best meets your needs. You may want to attend a tobacco cessation program. When you choose a program, look for one that has proven success. Ask your doctor for ideas. You will greatly increase your chances of success if you take medicine as well as get counseling or join a cessation program.  Some of the changes you feel when you first quit smokeless tobacco are uncomfortable.  Your body will miss the nicotine at first, and you may feel short-tempered and grumpy. You may have trouble sleeping or concentrating. Medicine can help you deal with these symptoms. You may struggle with changing your habits and rituals. The last step is the tricky one: Be prepared for the urge to use smokeless tobacco to continue for a time. This is a lot to deal with, but keep at it. You will feel better. Follow-up care is a key part of your treatment and safety. Be sure to make and go to all appointments, and call your doctor if you are having problems. It's also a good idea to know your test results and keep a list of the medicines you take. How can you care for yourself at home? · Ask your family, friends, and coworkers for support. You have a better chance of quitting if you have help and support. · Join a support group for people who are trying to quit using smokeless tobacco.  · Set a quit date. Pick your date carefully so that it is not right in the middle of a big deadline or stressful time. After you quit, do not use smokeless tobacco even once. Get rid of all spit cups, cans, and pouches after your last use. Clean your house and your clothes so that they do not smell of tobacco.  · Learn how to be a non-user. Think about ways you can avoid those things that make you reach for tobacco.  ? Learn some ways to deal with cravings, like calling a friend or going for a walk. Cravings often pass. ? Avoid situations that put you at greatest risk for using smokeless tobacco. For some people, it is hard to spend time with friends without dipping or chewing. For others, they might skip a coffee break with coworkers who smoke or use smokeless tobacco.  ? Change your daily routine. Take a different route to work, or eat a meal in a different place. · Cut down on stress. Calm yourself or release tension by doing an activity you enjoy, such as reading a book, taking a hot bath, or gardening. · Talk to your doctor or pharmacist about nicotine replacement therapy.  You still get nicotine, but you do not use tobacco. Nicotine replacement products help you slowly reduce the amount of nicotine you need. Many of these products are available over the counter. They include nicotine patches, gum, lozenges, and inhalers. · Ask your doctor about bupropion (Wellbutrin) or varenicline (Chantix), which are prescription medicines. They do not contain nicotine. They help you by reducing withdrawal symptoms, such as stress and anxiety. · Get regular exercise. Having healthy habits will help your body move past its craving for nicotine. · Be prepared to keep trying. Most people are not successful the first few times they try to quit. Do not get mad at yourself if you use tobacco again. Make a list of things you learned, and think about when you want to try again, such as next week, next month, or next year. Where can you learn more? Go to https://TipserpeNanophthalmics.Swarmforce. org and sign in to your [employer] account. Enter F075 in the [employer] box to learn more about \"Stopping Smokeless Tobacco Use: Care Instructions. \"     If you do not have an account, please click on the \"Sign Up Now\" link. Current as of: March 12, 2020               Content Version: 12.5  © 7561-0070 Healthwise, Incorporated. Care instructions adapted under license by [facility] ([facility]). If you have questions about a medical condition or this instruction, always ask your healthcare professional. [place] [address] any warranty or liability for your use of this information.

## 2020-10-20 ENCOUNTER — OFFICE VISIT (OUTPATIENT)
Dept: FAMILY MEDICINE CLINIC | Age: 35
End: 2020-10-20
Payer: COMMERCIAL

## 2020-10-20 ENCOUNTER — APPOINTMENT (OUTPATIENT)
Dept: ULTRASOUND IMAGING | Age: 35
End: 2020-10-20
Payer: COMMERCIAL

## 2020-10-20 ENCOUNTER — HOSPITAL ENCOUNTER (EMERGENCY)
Age: 35
Discharge: HOME | End: 2020-10-20
Attending: EMERGENCY MEDICINE
Payer: COMMERCIAL

## 2020-10-20 VITALS
SYSTOLIC BLOOD PRESSURE: 120 MMHG | TEMPERATURE: 97.4 F | BODY MASS INDEX: 30.49 KG/M2 | RESPIRATION RATE: 12 BRPM | DIASTOLIC BLOOD PRESSURE: 82 MMHG | OXYGEN SATURATION: 99 % | WEIGHT: 213 LBS | HEART RATE: 67 BPM | HEIGHT: 70 IN

## 2020-10-20 VITALS
WEIGHT: 210 LBS | OXYGEN SATURATION: 99 % | HEART RATE: 70 BPM | HEIGHT: 69 IN | TEMPERATURE: 97.7 F | RESPIRATION RATE: 16 BRPM | BODY MASS INDEX: 31.1 KG/M2 | DIASTOLIC BLOOD PRESSURE: 94 MMHG | SYSTOLIC BLOOD PRESSURE: 157 MMHG

## 2020-10-20 LAB
BACTERIA: NORMAL
BILIRUBIN URINE: NEGATIVE
BLOOD, URINE: NEGATIVE
CASTS: NORMAL /LPF
CASTS: NORMAL /LPF
CHARACTER, URINE: CLEAR
COLOR: YELLOW
CRYSTALS: NORMAL
EPITHELIAL CELLS, UA: NORMAL /HPF
GLUCOSE, URINE: NEGATIVE MG/DL
KETONES, URINE: NEGATIVE
LEUKOCYTE ESTERASE, URINE: NEGATIVE
MISCELLANEOUS LAB TEST RESULT: NORMAL
NITRITE, URINE: NEGATIVE
PH UA: 6.5 (ref 5–9)
PROTEIN UA: NEGATIVE MG/DL
RBC URINE: NORMAL /HPF
RENAL EPITHELIAL, UA: NORMAL
SPECIFIC GRAVITY UA: 1.02 (ref 1–1.03)
UROBILINOGEN, URINE: 0.2 EU/DL (ref 0–1)
WBC UA: NORMAL /HPF
YEAST: NORMAL

## 2020-10-20 PROCEDURE — 76870 US EXAM SCROTUM: CPT

## 2020-10-20 PROCEDURE — 99282 EMERGENCY DEPT VISIT SF MDM: CPT

## 2020-10-20 PROCEDURE — 81001 URINALYSIS AUTO W/SCOPE: CPT

## 2020-10-20 PROCEDURE — 87491 CHLMYD TRACH DNA AMP PROBE: CPT

## 2020-10-20 PROCEDURE — 4004F PT TOBACCO SCREEN RCVD TLK: CPT | Performed by: FAMILY MEDICINE

## 2020-10-20 PROCEDURE — 6370000000 HC RX 637 (ALT 250 FOR IP)

## 2020-10-20 PROCEDURE — 99214 OFFICE O/P EST MOD 30 MIN: CPT | Performed by: FAMILY MEDICINE

## 2020-10-20 PROCEDURE — 87591 N.GONORRHOEAE DNA AMP PROB: CPT

## 2020-10-20 PROCEDURE — G8484 FLU IMMUNIZE NO ADMIN: HCPCS | Performed by: FAMILY MEDICINE

## 2020-10-20 PROCEDURE — G8427 DOCREV CUR MEDS BY ELIG CLIN: HCPCS | Performed by: FAMILY MEDICINE

## 2020-10-20 PROCEDURE — G8417 CALC BMI ABV UP PARAM F/U: HCPCS | Performed by: FAMILY MEDICINE

## 2020-10-20 RX ORDER — IBUPROFEN 200 MG
TABLET ORAL
Status: COMPLETED
Start: 2020-10-20 | End: 2020-10-20

## 2020-10-20 RX ORDER — IBUPROFEN 200 MG
600 TABLET ORAL ONCE
Status: COMPLETED | OUTPATIENT
Start: 2020-10-20 | End: 2020-10-20

## 2020-10-20 RX ADMIN — IBUPROFEN 600 MG: 200 TABLET, FILM COATED ORAL at 13:47

## 2020-10-20 RX ADMIN — Medication 600 MG: at 13:47

## 2020-10-20 ASSESSMENT — ENCOUNTER SYMPTOMS
WHEEZING: 0
RHINORRHEA: 0
SHORTNESS OF BREATH: 0
EYE PAIN: 0
SORE THROAT: 0
ABDOMINAL DISTENTION: 0
ABDOMINAL PAIN: 0
NAUSEA: 0
COUGH: 0
DIARRHEA: 0
VOMITING: 0
EYE DISCHARGE: 0

## 2020-10-20 ASSESSMENT — PAIN SCALES - GENERAL
PAINLEVEL_OUTOF10: 7
PAINLEVEL_OUTOF10: 7

## 2020-10-20 ASSESSMENT — PAIN DESCRIPTION - LOCATION: LOCATION: GROIN

## 2020-10-20 ASSESSMENT — PAIN DESCRIPTION - PAIN TYPE: TYPE: ACUTE PAIN

## 2020-10-20 ASSESSMENT — PAIN DESCRIPTION - ORIENTATION: ORIENTATION: LEFT

## 2020-10-20 ASSESSMENT — PAIN DESCRIPTION - FREQUENCY: FREQUENCY: CONTINUOUS

## 2020-10-20 ASSESSMENT — PAIN DESCRIPTION - DESCRIPTORS: DESCRIPTORS: PRESSURE

## 2020-10-20 NOTE — ED PROVIDER NOTES
[place] ENCOUNTER          Pt Name: Juan Antonio Perez  MRN: [id number]  [place] 1985  Date of evaluation: 10/20/2020  Treating Resident Physician: Rizzo MD  Supervising Physician: Mylene Gonzales MD    CHIEF COMPLAINT       Chief Complaint   Patient presents with    Groin Pain     left     History obtained from. HISTORY OF PRESENT ILLNESS    HPI  Juan Antonio Perez is a 38 y.o. male who presents to the emergency department for evaluation of groin pain. Patient stated that he started having groin pain approximately 2 weeks ago. States that it became worse 3 days ago when lifting a refrigerator. Patient states that he feels pressure to the left pelvis area. Is currently a 4 out of 10 pressure sensation. Aggravated by lifting heavy object and cough alleviated by rest.  She denies similar symptoms in the past.  Patient denies any nausea or vomiting. The patient has no other acute complaints at this time. REVIEW OF SYSTEMS   Review of Systems   Constitutional: Negative for fatigue and fever. HENT: Negative for congestion, ear pain, rhinorrhea and sore throat. Eyes: Negative for pain and discharge. Respiratory: Negative for cough, shortness of breath and wheezing. Cardiovascular: Negative for chest pain, palpitations and leg swelling. Gastrointestinal: Negative for abdominal distention, abdominal pain, diarrhea, nausea and vomiting. Genitourinary: Negative for difficulty urinating, flank pain and frequency. Left lower pelvic pain   Musculoskeletal: Negative for arthralgias. Neurological: Negative for dizziness, tremors, syncope, weakness and numbness.          PAST MEDICAL AND SURGICAL HISTORY     Past Medical History:   Diagnosis Date    Dependence on nicotine from chewing tobacco 6/23/2015    Eczema 6/23/2015    GERD (gastroesophageal reflux disease)     History of asthma     Hypertension 2014    Irritable bowel syndrome with diarrhea     Migraine headache     NAFLD (nonalcoholic fatty liver disease)     Obesity (BMI 30-39. 9)     Rheumatoid arthritis ([facility] [address]     Smoker      Past Surgical History:   Procedure Laterality Date    APPENDECTOMY  2013    COLONOSCOPY      FINGER SURGERY  10/17/15    left pinky finger    LAPAROSCOPIC APPENDECTOMY  2013    TONSILLECTOMY           MEDICATIONS   No current facility-administered medications for this encounter. Current Outpatient Medications:     Methotrexate, PF, 12.5 MG/0.4ML SOAJ, Inject 12.5 mg into the skin once a week, Disp: , Rfl:     Folic Acid 5 MG CAPS, Take 5 mg by mouth daily, Disp: , Rfl:     lisinopril-hydroCHLOROthiazide (PRINZIDE) 20-12.5 MG per tablet, Take 1 tablet by mouth daily, Disp: 90 tablet, Rfl: 3    omeprazole (PRILOSEC) 20 MG delayed release capsule, Take 1 capsule by mouth daily, Disp: 90 capsule, Rfl: 3    meloxicam (MOBIC) 15 MG tablet, Take 1 tablet by mouth daily as needed for Pain, Disp: 30 tablet, Rfl: 3      SOCIAL HISTORY     Social History     Social History Narrative    Not on file     Social History     Tobacco Use    Smoking status: Current Every Day Smoker     Packs/day: 0.75     Years: 12.00     Pack years: 9.00     Types: Cigarettes     Last attempt to quit: 10/23/2014     Years since quittin.0    Smokeless tobacco: Current User     Types: Chew   Substance Use Topics    Alcohol use: No     Comment: social    Drug use: No         ALLERGIES   No Known Allergies      FAMILY HISTORY     Family History   Problem Relation Age of Onset    Heart Disease Mother 52        MI    Early Death Father         MVA    Prostate Cancer Paternal Grandfather         unsure age   [facility] High Blood Pressure Other         Pt unsure if anyone in family does.  Colon Cancer Neg Hx          PREVIOUS RECORDS   Previous records reviewed today.         PHYSICAL EXAM     ED Triage Vitals [10/20/20 1148]   BP Patient will be advised to follow-up with surgery for recommendations regarding inguinal hernia. Patient also instructed on precautions including worsening of pain continued pain, nausea, vomiting or fever to return to the ER for further evaluation. Patient verbalized good understanding. ED RESULTS   Laboratory results:  Labs Reviewed   Jdoy Falcon DNA   URINALYSIS WITH MICROSCOPIC       Radiologic studies results:  US SCROTUM AND TESTICLES   Final Result    No evidence of testicular torsion. **This report has been created using voice recognition software. It may contain minor errors which are inherent in voice recognition technology. **      Final report electronically signed by Dr. Brionna Delgado on 10/20/2020 3:18 PM          ED Medications administered this visit:   Medications   ibuprofen (ADVIL;MOTRIN) tablet 600 mg (600 mg Oral Given 10/20/20 1347)         ED COURSE        Strict return precautions and follow up instructions were discussed with the patient prior to discharge, with which the patient agrees. MEDICATION CHANGES     Discharge Medication List as of 10/20/2020  3:24 PM            FINAL DISPOSITION     Final diagnoses:   Inguinal hernia, left     Condition: condition: good  Dispo: Discharge to home      This transcription was electronically signed. Parts of this transcriptions may have been dictated by use of voice recognition software and electronically transcribed, and parts may have been transcribed with the assistance of an ED scribe. The transcription may contain errors not detected in proofreading. Please refer to my supervising physician's documentation if my documentation differs.     Electronically Signed: Colton Crowell,, 10/23/20, 8:04 Sylvia Wilkes MD  Resident  10/20/20 [id number]       Azul Martinez MD  Resident  10/20/20 7527       Ashley Salgado MD  10/23/20 [id number]

## 2020-10-20 NOTE — ED PROVIDER NOTES
ATTENDING ATTESTATION  Evaluation of Jigar Patel. I personally saw and examined the patient. I have reviewed and agree with the Resident findings, including all diagnostic interpretations and treatment plans as written. I was present for the key portion of any procedures performed and the inclusive time noted in any critical care statement. I agree with the note and plan as documented by her, except if my documentation differs. I reviewed the medical, surgical, family and social history, medications and allergies. I have reviewed the nursing documentation. I have reviewed the patient's vital signs. Patient c/o L groin pain x2 weeks. Radiation to groin. Pt did lift a heavy object on Saturday which worsened the pain. Patient reports pain worsened with cough and ambulation. NO urinary symptoms. Physical exam is notable for well appearing male. Fullness in the L inguinal canal. No skin changes. No evidence of horizontal testicular, skin changes over the testicle. No testicular tenderness. Cremasteric reflexes present. MDM: 43yo M with L groin pain and palpable hernia on exam. Worse with ambulation and cough. No evidence of testicular torsion based on exam.  Urine is negative. Plan: D/C with outpatient surgery referral     Please see the residents' completed note for final disposition except as documented on this attestation. Diagnosis, treatment and disposition plans were discussed and agreed upon by patient. This transcription was electronically signed. It was dictated by use of voice recognition software and electronically transcribed. The transcription may contain errors not detected in proofreading.      I was present for the critical portion following procedures: None       Electronically signed by Almeida MD on 10/20/20 at 3:22 PM GUNNAR Cunningham MD  10/23/20 [id number]

## 2020-10-20 NOTE — PROGRESS NOTES
Chief Complaint   Patient presents with    Groin Pain     x 1-2 weeka pain increased last 2 days       History obtained from the patient. SUBJECTIVE:  Reinaldo Griffin is a 38 y.o. male that presents today for     -L groin pain:  Started 1 to 2 wks ago  Worse over the weekend  A little better than over the weekend  7/10 right now  Unbearable over the weekend. Laying down with legs spread make it better  Movement, pressure, walking make it worse  Started after a coughing fit and worse after moving a dresser. No LUTS or dysuria  Feels swollen in that area  No hx of hernia      -Tobacco:  Is still chewing tobacco, not interested in quitting  Has started smoking again as well since last visit, 1/2 PPD. Not interested in quitting that either.        Age/Gender Health Maintenance    Lipid -   No components found for: CHLPL  Lab Results   Component Value Date    TRIG 77 08/05/2020    TRIG 113 07/30/2019    TRIG 113 06/24/2017     Lab Results   Component Value Date    HDL 31 08/05/2020    HDL 39 07/30/2019    HDL 35 06/24/2017     Lab Results   Component Value Date    LDLCALC 112 08/05/2020    [address] 07/30/2019    [address] 06/24/2017     No results found for: LABVLDL      DM Screen -   Lab Results   Component Value Date    GLUCOSE 97 03/03/2020     Lab Results   Component Value Date    LABA1C 5.2 08/06/2019       Colon Cancer Screening - Age 48    Tetanus - Declines July 2017/July 2019  Influenza Vaccine - Declines FALL 2020  Pneumonia Vaccine - Age 72  Zoster - age 48     PSA testing discussion - Age 54  AAA Screening - Age 71; smoked      Current Outpatient Medications   Medication Sig Dispense Refill    Methotrexate, PF, 12.5 MG/0.4ML SOAJ Inject 12.5 mg into the skin once a week      Folic Acid 5 MG CAPS Take 5 mg by mouth daily      lisinopril-hydroCHLOROthiazide (PRINZIDE) 20-12.5 MG per tablet Take 1 tablet by mouth daily 90 tablet 3    omeprazole (PRILOSEC) 20 MG delayed release capsule Take 1 capsule by mouth daily 90 capsule 3    meloxicam (MOBIC) 15 MG tablet Take 1 tablet by mouth daily as needed for Pain 30 tablet 3     No current facility-administered medications for this visit. No orders of the defined types were placed in this encounter. All medications reviewed and reconciled, including OTC and herbal medications. Updated list given to patient. Patient Active Problem List   Diagnosis    History of asthma    Migraine headache    Hypertension    Dependence on nicotine from chewing tobacco    Eczema    GERD (gastroesophageal reflux disease)    Irritable bowel syndrome with diarrhea    NAFLD (nonalcoholic fatty liver disease)    Rheumatoid arthritis (HCC)    Cervicalgia    Chronic bilateral low back pain without sciatica    Obesity (BMI 30-39. 9)    Smoker       Past Medical History:   Diagnosis Date    Dependence on nicotine from chewing tobacco 2015    Eczema 2015    GERD (gastroesophageal reflux disease)     History of asthma     Hypertension 2014    Irritable bowel syndrome with diarrhea     Migraine headache     NAFLD (nonalcoholic fatty liver disease)     Obesity (BMI 30-39. 9)     Rheumatoid arthritis ([address]     Smoker        Past Surgical History:   Procedure Laterality Date    APPENDECTOMY  2013    COLONOSCOPY      FINGER SURGERY  10/17/15    left pinky finger    LAPAROSCOPIC APPENDECTOMY  2013    TONSILLECTOMY         No Known Allergies      Social History     Tobacco Use    Smoking status: Current Every Day Smoker     Packs/day: 0.75     Years: 12.00     Pack years: 9.00     Types: Cigarettes     Last attempt to quit: 10/23/2014     Years since quittin.9    Smokeless tobacco: Current User     Types: Chew   Substance Use Topics    Alcohol use: No     Comment: social       Family History   Problem Relation Age of Onset    Heart Disease Mother 52        MI    Early Death Father         MVA    Prostate Cancer Paternal Grandfather         unsure age   [facility]oss[facility] Lela High Blood Pressure Other         Pt unsure if anyone in family does.  Colon Cancer Neg Hx          I have reviewed the patient's past medical history, past surgical history, allergies, medications, social and family history and I have made updates where appropriate. Review of Systems  Positive responses are highlighted in bold    Constitutional:  Fever, Chills, Night Sweats, Fatigue, Unexpected changes in weight  HENT:  Ear pain, Tinnitus, Nosebleeds, Trouble swallowing, Hearing loss, Sore throat  Cardiovascular:  Chest Pain, Palpitations, Orthopnea, Paroxysmal Nocturnal Dyspnea  Respiratory:  Cough, Wheezing, Shortness of breath, Chest tightness, Apnea  Gastrointestinal:  Nausea, Vomiting, Diarrhea, Constipation, Heartburn, Blood in stool  Genitourinary:  Difficulty or painful urination, Flank pain, Change in frequency, Urgency  Skin:  Color change, Rash, Itching, Wound  Musculoskeletal:  Joint pain, Back pain, Gait problems, Joint swelling, Myalgias  Neurological:  Dizziness, Headaches, Presyncope, Numbness, Seizures, Tremors  Endocrine:  Heat Intolerance, Cold Intolerance, Polydipsia, Polyphagia, Polyuria      PHYSICAL EXAM:  Vitals:    10/20/20 1109   BP: 120/82   Pulse: 67   Resp: 12   Temp: 97.4 °F (36.3 °C)   TempSrc: Oral   SpO2: 99%   Weight: 213 lb (96.6 kg)   Height: 5' 9.69\" (1.77 m)     Body mass index is 30.84 kg/m².   Pain Score:   7(L Groin)    VS Reviewed  General Appearance: A&O x 3, No acute distress,well developed and well- nourished  Eyes: pupils equal, round, and reactive to light, extraocular eye movements intact, conjunctivae and eye lids without erythema  ENT: external ear and ear canal clear bilaterally, TMs intact and regular, nose without deformity, nasal mucosa and turbinates normal without polyps, oropharynx normal, dentition is normal for age  Neck: supple and non-tender without mass, no thyromegaly or thyroid nodules, no cervical lymphadenopathy  Pulmonary/Chest: clear to auscultation bilaterally- no wheezes, rales or rhonchi, normal air movement, no respiratory distress or retractions  Cardiovascular: S1 and S2 auscultated w/ RRR. No murmurs, rubs, clicks, or gallops, distal pulses intact. Abdomen: soft, non-tender, non-distended, bowl sounds physiologic,  no rebound or guarding, no masses or hernias noted. Liver and spleen without enlargement. Groin: Minimal TTP of R testicle, not swollen. Firm mass on R inguinal canal that is quite tender. Not able to reduce. Extremities: no cyanosis, clubbing or edema of the lower extremities. Skin: warm and dry, no rash or erythema      ASSESSMENT & PLAN  1. Severe left groin pain    Concern for possible partially incarcerated L ing hernia  ddx includes hernia, abscess, LA  VSS  However, based on hx and exam findings, needs more urgent evaluation  Will have him see in ER today to r/o incarcerated hx  Report called to ER  Safe to trave by junie    2. Smoker    In precontemplation stage and not ready to quit. Declines cessation, aware of risks of continued smoking as well as resources available to help quit. These include tobacco cessation classes as well as 1-800-QUIT NOW. No barriers other than lack of desire. 3+ min spent counseling. 3. Chewing tobacco nicotine dependence without complication    As above. DISPOSITION    Return if symptoms worsen or fail to improve.     Kirstin Ruiz released to the ER    Future Appointments   Date Time Provider Tylor Reyes   8/13/2021  8:20 AM Elaine Herring [address] [place] [address] - [place] [facility]       Electronically signed by Elaine Herring DO on 10/20/2020 at 11:35 AM

## 2020-10-23 LAB
CHLAMYDIA TRACHOMATIS AMPLIFIED DET: NEGATIVE
NEISSERIA GONORRHOEAE BY AMP: NEGATIVE
SPECIMEN SOURCE: NORMAL

## 2020-11-02 ENCOUNTER — OFFICE VISIT (OUTPATIENT)
Dept: SURGERY | Age: 35
End: 2020-11-02
Payer: COMMERCIAL

## 2020-11-02 VITALS
WEIGHT: 212 LBS | HEIGHT: 69 IN | BODY MASS INDEX: 31.4 KG/M2 | TEMPERATURE: 97.4 F | DIASTOLIC BLOOD PRESSURE: 78 MMHG | RESPIRATION RATE: 15 BRPM | OXYGEN SATURATION: 98 % | HEART RATE: 77 BPM | SYSTOLIC BLOOD PRESSURE: 125 MMHG

## 2020-11-02 PROBLEM — R10.32 LEFT GROIN PAIN: Status: ACTIVE | Noted: 2020-11-02

## 2020-11-02 PROCEDURE — G8417 CALC BMI ABV UP PARAM F/U: HCPCS | Performed by: SURGERY

## 2020-11-02 PROCEDURE — G8484 FLU IMMUNIZE NO ADMIN: HCPCS | Performed by: SURGERY

## 2020-11-02 PROCEDURE — 99202 OFFICE O/P NEW SF 15 MIN: CPT | Performed by: SURGERY

## 2020-11-02 PROCEDURE — 4004F PT TOBACCO SCREEN RCVD TLK: CPT | Performed by: SURGERY

## 2020-11-02 PROCEDURE — G8427 DOCREV CUR MEDS BY ELIG CLIN: HCPCS | Performed by: SURGERY

## 2020-11-02 ASSESSMENT — ENCOUNTER SYMPTOMS
BLOOD IN STOOL: 0
CONSTIPATION: 0
STRIDOR: 0
DIARRHEA: 0
COUGH: 0
CHOKING: 0
PHOTOPHOBIA: 0
TROUBLE SWALLOWING: 0
EYE DISCHARGE: 0
SHORTNESS OF BREATH: 0
NAUSEA: 0
RECTAL PAIN: 0
EYE PAIN: 0
APNEA: 0
FACIAL SWELLING: 0
BACK PAIN: 0
EYE REDNESS: 0
VOICE CHANGE: 0
COLOR CHANGE: 0
VOMITING: 0
RHINORRHEA: 0
SINUS PRESSURE: 0
ABDOMINAL PAIN: 1
SINUS PAIN: 0
ABDOMINAL DISTENTION: 0
ANAL BLEEDING: 0
WHEEZING: 0
SORE THROAT: 0
EYE ITCHING: 0
CHEST TIGHTNESS: 0

## 2020-11-02 NOTE — PROGRESS NOTES
Subjective:      Patient ID: Jennifer Wells is a 38 y.o. male. Chief Complaint   Patient presents with    Surgical Consult     established pt--was in [facility] ER 10/20 with left inguinal hernia       HPI  /78 (Site: Right Upper Arm, Position: Sitting, Cuff Size: Medium Adult)   Pulse 77   Temp 97.4 °F (36.3 °C) (Axillary)   Resp 15   Ht 5' 9\" (1.753 m)   Wt 212 lb (96.2 kg)   SpO2 98%   BMI 31.31 kg/m²     Review of Systems   Constitutional: Negative for activity change, appetite change, chills, diaphoresis, fatigue, fever and unexpected weight change. HENT: Negative for congestion, dental problem, drooling, ear discharge, ear pain, facial swelling, hearing loss, mouth sores, nosebleeds, postnasal drip, rhinorrhea, sinus pressure, sinus pain, sneezing, sore throat, tinnitus, trouble swallowing and voice change. Eyes: Negative for photophobia, pain, discharge, redness, itching and visual disturbance. Respiratory: Negative for apnea, cough, choking, chest tightness, shortness of breath, wheezing and stridor. Cardiovascular: Negative for chest pain, palpitations and leg swelling. Gastrointestinal: Positive for abdominal pain (lower abd/ groin). Negative for abdominal distention, anal bleeding, blood in stool, constipation, diarrhea, nausea, rectal pain and vomiting. Endocrine: Negative for cold intolerance, heat intolerance, polydipsia, polyphagia and polyuria. Genitourinary: Positive for frequency, scrotal swelling and testicular pain. Negative for decreased urine volume, difficulty urinating, discharge, dysuria, enuresis, flank pain, genital sores, hematuria, penile pain, penile swelling and urgency. Musculoskeletal: Negative for arthralgias, back pain, gait problem, joint swelling, myalgias, neck pain and neck stiffness. Skin: Negative for color change, pallor, rash and wound. Allergic/Immunologic: Negative for environmental allergies, food allergies and immunocompromised state. Neurological: Negative for dizziness, tremors, seizures, syncope, facial asymmetry, speech difficulty, weakness, light-headedness, numbness and headaches. Hematological: Negative for adenopathy. Does not bruise/bleed easily. Psychiatric/Behavioral: Negative for agitation, behavioral problems, confusion, decreased concentration, dysphoric mood, hallucinations, self-injury, sleep disturbance and suicidal ideas. The patient is not nervous/anxious and is not hyperactive.         Objective:   Physical Exam    Assessment:            Plan:              Cori Rodriguez

## 2020-11-02 NOTE — PROGRESS NOTES
Jil Reyes MD [facility]  General Surgery  New Patient Evaluation in Office  Pt Name: Evette Yeh  Date of Birth 1985   Today's Date: 11/2/2020  Medical Record Number: [id number]  Referring Provider: No ref. provider found  Primary Care Provider: Elaine Herring DO  Chief Complaint   Patient presents with    Surgical Consult     established pt--was in [facility] ER 10/20 with left inguinal hernia     ASSESSMENT      Problem List Items Addressed This Visit     Left groin pain    Relevant Orders    US EXTREMITY LEFT NON VASC LIMITED        Past Medical History:   Diagnosis Date    Dependence on nicotine from chewing tobacco 6/23/2015    Eczema 6/23/2015    GERD (gastroesophageal reflux disease)     History of asthma     Hypertension 9/30/2014    Irritable bowel syndrome with diarrhea     Migraine headache     NAFLD (nonalcoholic fatty liver disease)     Obesity (BMI 30-39. 9)     Rheumatoid arthritis ([address]     Smoker           PLANS      1. Schedule Kirstin Ruiz for  ultrasound left groin to eval for hernia, no mention made on recent US of testicle and scrotum . The left cord structures feel little thicker but in the office I could not demonstrate any bulge or any transmitted vibration. Review of CT scan abdomen pelvis from 2013 showed no evidence of indirect sac that I could see at that time. 2. Follow up after done  Orders Placed This Encounter:  Orders Placed This Encounter   Procedures    US EXTREMITY LEFT NON VASC LIMITED     Standing Status:   Future     Standing Expiration Date:   11/2/2021     Order Specific Question:   Reason for exam:     Answer:   evaluate for left inguinal hernia        SUBJECTIVE      Kirstin Ruiz is a 38 y.o. male seen in the office for evaluation of a left inguinal hernia. He states he began about 3 weeks ago with left groin pain. He states is present there all the time. He states it is worse when he is climbing steps and he goes to take the next step raising his leg.   He cannot really say that he sees any bulge on that side. He thinks it looks a little denny compared to the other side. He was seen in his primary care doc's office and he was concerned enough about his pain that he sent her to the emergency department where they did a scrotal and testicular ultrasound which was reviewed and no comment is made about the groin or any kind of hernia. There are 2 pictures in it 1 this is with Valsalva and with other but I cannot really tell if anything was visualized with Valsalva on the left side or not. He has never had a hernia repair in the past.  We did review a CT scan from 2013 of his abdomen and pelvis and he did not have any evidence of hernia at that time but that was 7 years ago. No mention of a indirect defect was made at his laparoscopic appendectomy performed by me in 2013. Past Medical History  Past Medical History:   Diagnosis Date    Dependence on nicotine from chewing tobacco 6/23/2015    Eczema 6/23/2015    GERD (gastroesophageal reflux disease)     History of asthma     Hypertension 9/30/2014    Irritable bowel syndrome with diarrhea     Migraine headache     NAFLD (nonalcoholic fatty liver disease)     Obesity (BMI 30-39. 9)     Rheumatoid arthritis ([address]     Smoker        Past Surgical History  Past Surgical History:   Procedure Laterality Date    APPENDECTOMY  05/02/2013    Dr. Danya Dominguez COLONOSCOPY  2015    [id number]  2018, 2019    [address] [facility] SURGERY  10/17/15    left pinky finger    LAPAROSCOPIC APPENDECTOMY  05/02/2013    Dr. Del Boateng ENDOSCOPY  2017    [facility]        Medications  Current Outpatient Medications on File Prior to Visit   Medication Sig Dispense Refill    Methotrexate, PF, 12.5 MG/0.4ML SOAJ Inject 12.5 mg into the skin once a week      Folic Acid 5 MG CAPS Take 5 mg by mouth daily      lisinopril-hydroCHLOROthiazide (PRINZIDE) 20-12.5 MG per tablet Take 1 tablet by mouth daily 90 tablet 3    omeprazole (PRILOSEC) 20 MG delayed release capsule Take 1 capsule by mouth daily 90 capsule 3    meloxicam (MOBIC) 15 MG tablet Take 1 tablet by mouth daily as needed for Pain 30 tablet 3     No current facility-administered medications on file prior to visit. Allergies  No Known Allergies    Family History  Family History   Problem Relation Age of Onset    Heart Disease Mother 52        MI    Early Death Father         MVA    Prostate Cancer Paternal Grandfather         unsure age   [place] High Blood Pressure Other         Pt unsure if anyone in family does.      Colon Cancer Neg Hx        Social History  Social History     Socioeconomic History    Marital status:      Spouse name: Not on file    Number of children: Not on file    Years of education: Not on file    Highest education level: Not on file   Occupational History    Not on file   Social Needs    Financial resource strain: Not on file    Food insecurity     Worry: Not on file     Inability: Not on file    Transportation needs     Medical: Not on file     Non-medical: Not on file   Tobacco Use    Smoking status: Current Every Day Smoker     Packs/day: 0.75     Years: 12.00     Pack years: 9.00     Types: Cigarettes    Smokeless tobacco: Former User     Types: Chew   Substance and Sexual Activity    Alcohol use: No     Comment: rare    Drug use: No    Sexual activity: Not on file   Lifestyle    Physical activity     Days per week: Not on file     Minutes per session: Not on file    Stress: Not on file   Relationships    Social connections     Talks on phone: Not on file     Gets together: Not on file     Attends Rastafari service: Not on file     Active member of club or organization: Not on file     Attends meetings of clubs or organizations: Not on file     Relationship status: Not on file    Intimate partner violence     Fear of current or ex partner: Not on file Emotionally abused: Not on file     Physically abused: Not on file     Forced sexual activity: Not on file   Other Topics Concern    Not on file   Social History Narrative    Not on file       [address] Maintenance   Topic Date Due    Varicella vaccine (1 of 2 - 2-dose childhood series) 09/15/1985    Pneumococcal 0-64 years Vaccine (1 of 1 - PPSV23) 09/15/1990    HIV screen  09/15/1999    Flu vaccine (1) 09/01/2020    Potassium monitoring  03/03/2021    Creatinine monitoring  08/05/2021    DTaP/Tdap/Td vaccine (2 - Td) 10/17/2025    Hepatitis A vaccine  Aged Out    Hepatitis B vaccine  Aged Out    Hib vaccine  Aged Out    Meningococcal (ACWY) vaccine  Aged Out       Review of Systems  Constitutional: Negative for activity change, appetite change, chills, diaphoresis, fatigue, fever and unexpected weight change. HENT: Negative for congestion, dental problem, drooling, ear discharge, ear pain, facial swelling, hearing loss, mouth sores, nosebleeds, postnasal drip, rhinorrhea, sinus pressure, sinus pain, sneezing, sore throat, tinnitus, trouble swallowing and voice change. Eyes: Negative for photophobia, pain, discharge, redness, itching and visual disturbance. Respiratory: Negative for apnea, cough, choking, chest tightness, shortness of breath, wheezing and stridor. Cardiovascular: Negative for chest pain, palpitations and leg swelling. Gastrointestinal: Positive for abdominal pain (lower abd/ groin). Negative for abdominal distention, anal bleeding, blood in stool, constipation, diarrhea, nausea, rectal pain and vomiting. Endocrine: Negative for cold intolerance, heat intolerance, polydipsia, polyphagia and polyuria. Genitourinary: Positive for frequency, scrotal swelling and testicular pain. Negative for decreased urine volume, difficulty urinating, discharge, dysuria, enuresis, flank pain, genital sores, hematuria, penile pain, penile swelling and urgency. obvious visible bulge noted. The cord structures are soon as I touch him he can oppose the way. The cord structures feel little thicker on the left than the right but with cough I can feel no transmitted vibration or nothing coming down with sac and version cannot really feel anything coming through the external ring. RECTAL: deferred, not clinically indicated  NEUROLOGIC: There are no focalizing motor or sensory deficits. CN II-XII are grossly intact. Bernardo Canas EXTREMITIES: no cyanosis, no clubbing and no edema.       .         Electronically signed by Archie Holley MD on 11/2/2020 at 11:24 AM

## 2020-11-02 NOTE — PROGRESS NOTES
Lynsey Garcia MD [facility]  General Surgery  New Patient Evaluation in Office  Pt Name: Tracey Juan  Date of Birth 1985   Today's Date: 11/4/2020  Medical Record Number: [id number]  Referring Provider: No ref. provider found  Primary Care Provider: Ulysses Yates DO  Chief Complaint   Patient presents with    Results     Ultrasound done on 11/3/2020     ASSESSMENT      Problem List Items Addressed This Visit     Hypertension (Chronic)    Relevant Orders    Basic Metabolic Panel    EKG 12 Lead    Left groin pain - Primary    Relevant Orders    LAPAROSCOPY REPAIR HERNIA INGUINAL    COVID-19 Ambulatory      Other Visit Diagnoses     Pre-op testing        Relevant Orders    Basic Metabolic Panel    EKG 12 Lead    Left inguinal hernia        Relevant Orders    Basic Metabolic Panel    EKG 12 Lead        Past Medical History:   Diagnosis Date    Dependence on nicotine from chewing tobacco 6/23/2015    Eczema 6/23/2015    GERD (gastroesophageal reflux disease)     History of asthma     Hypertension 9/30/2014    Irritable bowel syndrome with diarrhea     Migraine headache     NAFLD (nonalcoholic fatty liver disease)     Obesity (BMI 30-39. 9)     Rheumatoid arthritis ([facility] [address]     sees Dr Jazzy Villegas      1. Schedule Jacob Vásquez for :  1. Robotic assisted left inguinal hernia possible right Dr. Mota Diss  2. General anesthesia  3. Outpatient  1. In the office, I had a discussion with the patient regarding the risks of hernia surgery to include bleeding, infection, recurrence, injury to surrounding structures and continued pain in the area. We also discussed the use of mesh and its advantages and disadvantages. We discussed the anesthetic options, conduct of the operation, outpatient status, post op recovery and length of time off of work. After this discussion, the patient's questions were answered and has elected to proceed with surgical repair. (OPEN)  2.  In the office, I had a discussion with the patient regarding the risks of hernia surgery to include bleeding, infection, recurrence, injury to surrounding structures, continued pain in the area and possible conversion to an open procedure . We also discussed the use of mesh and its advantages and disadvantages. We discussed the anesthetic options, conduct of the operation, outpatient status, post op recovery and length of time off of work. After this discussion, the patient's questions were answered and has elected to proceed with surgical repair. (L/S)  3. Status: outpatient  4. Planned anesthesia: GEN  5. He will undergo pre-operative clearance per anesthesia guidelines with risk factors listed under the past medical history diagnosis & problem list.  6. Perioperative discontinuation of ASA, Plavix, warfarin, Brillinta, Effient, Pradaxa, Eliquis and Xarelto. Continuation of 81 mg Aspirin is acceptable. 7. Perioperative medical clearance is not required  The patient was counseled at length about the risks of francoise Covid-19 during their perioperative period and any recovery window from their procedure. The patient was made aware that francoise Covid-19  may worsen their prognosis for recovering from their procedure  and lend to a higher morbidity and/or mortality risk. All material risks, benefits, and reasonable alternatives including postponing the procedure were discussed. The patient does wish to proceed with the procedure at this time.     8.   Orders Placed This Encounter:  Orders Placed This Encounter   Procedures    LAPAROSCOPY REPAIR HERNIA INGUINAL     Standing Status:   Future     Standing Expiration Date:   11/4/2021     Order Specific Question:   Pre-procedure Diagnosis     Answer:   Left inguinal hernia    COVID-19 Ambulatory     Standing Status:   Future     Standing Expiration Date:   11/4/2021     Scheduling Instructions:      Saline media preferred given current shortage of viral transport media but both acceptable     Order Specific Question:   Is this test for diagnosis or screening? Answer:   Screening     Order Specific Question:   Symptomatic for COVID-19 as defined by CDC? Answer:   No     Order Specific Question:   Date of Symptom Onset     Answer:   N/A     Order Specific Question:   Hospitalized for COVID-19? Answer:   No     Order Specific Question:   Admitted to ICU for COVID-19? Answer:   No     Order Specific Question:   Employed in healthcare setting? Answer:   No     Order Specific Question:   Resident in a congregate (group) care setting? Answer:   No     Order Specific Question:   Pregnant: Answer:   No     Order Specific Question:   Previously tested for COVID-19? Answer:   Unknown    Basic Metabolic Panel     Standing Status:   Future     Standing Expiration Date:   11/4/2021    EKG 12 Lead     Standing Status:   Future     Standing Expiration Date:   11/4/2021     Order Specific Question:   Reason for Exam?     Answer:   Hypertension        SUBJECTIVE      Landon Pavon is a 38 y.o. male seen in the office for the evaluation of a possible left inguinal hernia. When we saw him in the office on November 2 and it was not clear clinically on exam whether he had a hernia or not. He had a testicular and scrotal ultrasound but no comments were made about seeing any kind of hernia. We sent him for a left groin ultrasound to evaluate to see if there was a hernia present or not he is now back in the office after that test is completed for reevaluation and discussion. He states he began about 3 weeks ago with left groin pain. He states is present there all the time. He states it is worse when he is climbing steps and he goes to take the next step raising his leg. He cannot really say that he sees any bulge on that side. He thinks it looks a little denny compared to the other side.   He was seen in his primary care doc's office and he was concerned enough about his pain that he sent her to the emergency department where they did a scrotal and testicular ultrasound which was reviewed and no comment is made about the groin or any kind of hernia. There are 2 pictures in it 1 this is with Valsalva and with other but I cannot really tell if anything was visualized with Valsalva on the left side or not. He has never had a hernia repair in the past.  We did review a CT scan from 2013 of his abdomen and pelvis and he did not have any evidence of hernia at that time but that was 7 years ago. No mention of a indirect defect was made at his laparoscopic appendectomy performed by me in 2013. We sent him for a dedicated left groin ultrasound to evaluate for hernia which revealed a small hernia:  Narrative    PROCEDURE: US EXTREMITY LEFT NON VASC LIMITED         CLINICAL INFORMATION: Left groin pain .         COMPARISON: No prior study.         TECHNIQUE: Grayscale and color Doppler ultrasound         FINDINGS: Small inguinal hernia measuring 1.7 x 2.0 cm. With Valsalva mesenteric fat can be seen protruding into the hernia. Normal nonenlarged lymph nodes are seen in the region.                   Impression    Left inguinal hernia as described     We talked about continued observation versus repair leaving it up to him to make the decision depending on how is bothering him. He states it still bothers him regularly and he would like to proceed with repair. Past Medical History  Past Medical History:   Diagnosis Date    Dependence on nicotine from chewing tobacco 6/23/2015    Eczema 6/23/2015    GERD (gastroesophageal reflux disease)     History of asthma     Hypertension 9/30/2014    Irritable bowel syndrome with diarrhea     Migraine headache     NAFLD (nonalcoholic fatty liver disease)     Obesity (BMI 30-39. 9)     Rheumatoid arthritis ([facility] [address]     sees Dr Billy Acosta Smoker        Past Surgical History  Past Surgical History:   Procedure Laterality Date    APPENDECTOMY 05/02/2013    Dr. Ferreira Ast COLONOSCOPY  2015    [facility] [address] [facility]  2018, 2019    [address]  10/17/15    left pinky finger    LAPAROSCOPIC APPENDECTOMY  05/02/2013    Dr. Fran Mojica ENDOSCOPY  2017    [facility] Medications  Current Outpatient Medications on File Prior to Visit   Medication Sig Dispense Refill    Methotrexate, PF, 12.5 MG/0.4ML SOAJ Inject 15 mg into the skin once a week       Folic Acid 5 MG CAPS Take 5 mg by mouth daily      lisinopril-hydroCHLOROthiazide (PRINZIDE) 20-12.5 MG per tablet Take 1 tablet by mouth daily 90 tablet 3    omeprazole (PRILOSEC) 20 MG delayed release capsule Take 1 capsule by mouth daily 90 capsule 3    meloxicam (MOBIC) 15 MG tablet Take 1 tablet by mouth daily as needed for Pain 30 tablet 3     No current facility-administered medications on file prior to visit. Allergies  No Known Allergies    Family History  Family History   Problem Relation Age of Onset    Heart Disease Mother 52        MI    Early Death Father         MVA    Prostate Cancer Paternal Grandfather         unsure age   [place] High Blood Pressure Other         Pt unsure if anyone in family does.      Colon Cancer Neg Hx        Social History  Social History     Socioeconomic History    Marital status:      Spouse name: Not on file    Number of children: Not on file    Years of education: Not on file    Highest education level: Not on file   Occupational History    Not on file   Social Needs    Financial resource strain: Not on file    Food insecurity     Worry: Not on file     Inability: Not on file    Transportation needs     Medical: Not on file     Non-medical: Not on file   Tobacco Use    Smoking status: Current Every Day Smoker     Packs/day: 0.75     Years: 12.00     Pack years: 9.00     Types: Cigarettes    Smokeless tobacco: Former User     Types: Chew   Substance and Sexual Activity    Alcohol use: No     Comment: rare    Drug use: No    Sexual activity: Not on file   Lifestyle    Physical activity     Days per week: Not on file     Minutes per session: Not on file    Stress: Not on file   Relationships    Social connections     Talks on phone: Not on file     Gets together: Not on file     Attends Adventism service: Not on file     Active member of club or organization: Not on file     Attends meetings of clubs or organizations: Not on file     Relationship status: Not on file    Intimate partner violence     Fear of current or ex partner: Not on file     Emotionally abused: Not on file     Physically abused: Not on file     Forced sexual activity: Not on file   Other Topics Concern    Not on file   Social History Narrative    Not on file       [address] Maintenance   Topic Date Due    Varicella vaccine (1 of 2 - 2-dose childhood series) 09/15/1985    Pneumococcal 0-64 years Vaccine (1 of 1 - PPSV23) 09/15/1990    HIV screen  09/15/1999    Flu vaccine (1) 09/01/2020    Potassium monitoring  03/03/2021    Creatinine monitoring  08/05/2021    DTaP/Tdap/Td vaccine (2 - Td) 10/17/2025    Hepatitis A vaccine  Aged Out    Hepatitis B vaccine  Aged Out    Hib vaccine  Aged Out    Meningococcal (ACWY) vaccine  Aged Out       Review of Systems  Constitutional: Negative for activity change, appetite change, chills, diaphoresis, fatigue, fever and unexpected weight change. HENT: Negative for congestion, dental problem, drooling, ear discharge, ear pain, facial swelling, hearing loss, mouth sores, nosebleeds, postnasal drip, rhinorrhea, sinus pressure, sinus pain, sneezing, sore throat, tinnitus, trouble swallowing and voice change. Eyes: Negative for photophobia, pain, discharge, redness, itching and visual disturbance. Respiratory: Negative for apnea, cough, choking, chest tightness, shortness of breath, wheezing and stridor.    Cardiovascular: Negative for and no tenderness, skin normal.  CHEST/LUNGS: chest symmetric with normal A/P diameter, normal respiratory rate and rhythm, lungs clear to auscultation without wheezes, rales or rhonchi. No accessory muscle use. Scars None   CARDIOVASCULAR: Heart sounds are normal.  Regular rate and rhythm without murmur, gallop or rub. Normal S1 and S2. . Carotid and femoral pulses 2+/4 and equal bilaterally. ABDOMEN: Normal shape. Laparoscopic scar(s) present. Normal bowel sounds. No bruits. Heddy  \"soft, nontender, nondistended, no masses or organomegaly. Tender in the left groin area no obvious visible bulge noted. The cord structures are soon as I touch him he can oppose the way. The cord structures feel little thicker on the left than the right but with cough I can feel no transmitted vibration or nothing coming down with sac and version cannot really feel anything coming through the external ring. RECTAL: deferred, not clinically indicated  NEUROLOGIC: There are no focalizing motor or sensory deficits. CN II-XII are grossly intact. Prisyncdy  EXTREMITIES: no cyanosis, no clubbing and no edema.       .         Electronically signed by Lynsey Garcia MD on 11/4/2020 at 10:50 AM

## 2020-11-03 ENCOUNTER — HOSPITAL ENCOUNTER (OUTPATIENT)
Dept: ULTRASOUND IMAGING | Age: 35
Discharge: HOME | End: 2020-11-03
Payer: COMMERCIAL

## 2020-11-03 PROCEDURE — 76882 US LMTD JT/FCL EVL NVASC XTR: CPT

## 2020-11-04 ENCOUNTER — PREP FOR PROCEDURE (OUTPATIENT)
Dept: SURGERY | Age: 35
End: 2020-11-04

## 2020-11-04 ENCOUNTER — OFFICE VISIT (OUTPATIENT)
Dept: SURGERY | Age: 35
End: 2020-11-04
Payer: COMMERCIAL

## 2020-11-04 ENCOUNTER — TELEPHONE (OUTPATIENT)
Dept: SURGERY | Age: 35
End: 2020-11-04

## 2020-11-04 VITALS
HEIGHT: 69 IN | RESPIRATION RATE: 18 BRPM | OXYGEN SATURATION: 98 % | WEIGHT: 213 LBS | HEART RATE: 86 BPM | BODY MASS INDEX: 31.55 KG/M2 | DIASTOLIC BLOOD PRESSURE: 80 MMHG | SYSTOLIC BLOOD PRESSURE: 120 MMHG | TEMPERATURE: 97.6 F

## 2020-11-04 PROCEDURE — G8484 FLU IMMUNIZE NO ADMIN: HCPCS | Performed by: SURGERY

## 2020-11-04 PROCEDURE — 4004F PT TOBACCO SCREEN RCVD TLK: CPT | Performed by: SURGERY

## 2020-11-04 PROCEDURE — G8427 DOCREV CUR MEDS BY ELIG CLIN: HCPCS | Performed by: SURGERY

## 2020-11-04 PROCEDURE — 99213 OFFICE O/P EST LOW 20 MIN: CPT | Performed by: SURGERY

## 2020-11-04 PROCEDURE — G8417 CALC BMI ABV UP PARAM F/U: HCPCS | Performed by: SURGERY

## 2020-11-04 ASSESSMENT — ENCOUNTER SYMPTOMS
NAUSEA: 0
FACIAL SWELLING: 0
DIARRHEA: 0
BACK PAIN: 0
SINUS PRESSURE: 0
STRIDOR: 0
RECTAL PAIN: 0
EYE REDNESS: 0
EYE ITCHING: 0
EYE PAIN: 0
CHOKING: 0
ANAL BLEEDING: 0
EYE DISCHARGE: 0
CONSTIPATION: 0
BLOOD IN STOOL: 0
ABDOMINAL DISTENTION: 0
PHOTOPHOBIA: 0
COUGH: 0
APNEA: 0
RHINORRHEA: 0
VOICE CHANGE: 0
WHEEZING: 0
ABDOMINAL PAIN: 0
VOMITING: 0
COLOR CHANGE: 0
TROUBLE SWALLOWING: 0
SORE THROAT: 0
CHEST TIGHTNESS: 0
SHORTNESS OF BREATH: 0

## 2020-11-04 NOTE — TELEPHONE ENCOUNTER
Scheduled Charbel for a robotic assisted left inguinal hernia repair, poss right, poss open on Fri 11/13. He will arrive to the hospital at 7:30 & he will be npo after midnight. Consent was signed & orders were given for an ekg & bmp which he is having done at [facility] the covid test. Antibacterial soap was also given.

## 2020-11-04 NOTE — PROGRESS NOTES
Subjective:      Patient ID: Zena Conrad is a 38 y.o. male. Chief Complaint   Patient presents with    Results     Ultrasound done on 11/3/2020       HPI    Review of Systems   Constitutional: Negative for activity change, appetite change, chills, diaphoresis, fatigue, fever and unexpected weight change. HENT: Negative for congestion, dental problem, drooling, ear discharge, ear pain, facial swelling, hearing loss, mouth sores, nosebleeds, postnasal drip, rhinorrhea, sinus pressure, sneezing, sore throat, tinnitus, trouble swallowing and voice change. Eyes: Negative for photophobia, pain, discharge, redness, itching and visual disturbance. Respiratory: Negative for apnea, cough, choking, chest tightness, shortness of breath, wheezing and stridor. Cardiovascular: Negative for chest pain, palpitations and leg swelling. Gastrointestinal: Negative for abdominal distention, abdominal pain, anal bleeding, blood in stool, constipation, diarrhea, nausea, rectal pain and vomiting. Endocrine: Negative for cold intolerance, heat intolerance, polydipsia, polyphagia and polyuria. Genitourinary: Negative for decreased urine volume, difficulty urinating, dysuria, enuresis, flank pain, frequency, genital sores, hematuria and urgency. Musculoskeletal: Negative for arthralgias, back pain, gait problem, joint swelling, myalgias, neck pain and neck stiffness. Skin: Negative for color change, pallor, rash and wound. Left groin discomfort/pain   Allergic/Immunologic: Negative for environmental allergies, food allergies and immunocompromised state. Neurological: Negative for dizziness, tremors, seizures, syncope, facial asymmetry, speech difficulty, weakness, light-headedness, numbness and headaches. Hematological: Negative for adenopathy. Does not bruise/bleed easily.    Psychiatric/Behavioral: Negative for agitation, behavioral problems, confusion, decreased concentration, dysphoric mood, hallucinations, self-injury, sleep disturbance and suicidal ideas. The patient is not nervous/anxious and is not hyperactive.       /80 (Site: Right Upper Arm, Position: Sitting, Cuff Size: Medium Adult)   Pulse 86   Temp 97.6 °F (36.4 °C) (Temporal)   Resp 18   Ht 5' 9\" (1.753 m)   Wt 213 lb (96.6 kg)   SpO2 98%   BMI 31.45 kg/m²     Objective:   Physical Exam    Assessment:            Plan:              Raven Trinh LPN

## 2020-11-05 ENCOUNTER — HOSPITAL ENCOUNTER (OUTPATIENT)
Age: 35
Setting detail: SPECIMEN
Discharge: HOME | End: 2020-11-05
Payer: COMMERCIAL

## 2020-11-05 PROCEDURE — U0003 INFECTIOUS AGENT DETECTION BY NUCLEIC ACID (DNA OR RNA); SEVERE ACUTE RESPIRATORY SYNDROME CORONAVIRUS 2 (SARS-COV-2) (CORONAVIRUS DISEASE [COVID-19]), AMPLIFIED PROBE TECHNIQUE, MAKING USE OF HIGH THROUGHPUT TECHNOLOGIES AS DESCRIBED BY CMS-2020-01-R: HCPCS

## 2020-11-08 LAB — SARS-COV-2: NOT DETECTED

## 2020-11-09 ENCOUNTER — HOSPITAL ENCOUNTER (OUTPATIENT)
Age: 35
Discharge: HOME | End: 2020-11-09
Payer: COMMERCIAL

## 2020-11-09 ENCOUNTER — TELEPHONE (OUTPATIENT)
Dept: SURGERY | Age: 35
End: 2020-11-09

## 2020-11-09 DIAGNOSIS — I10 ESSENTIAL HYPERTENSION: ICD-10-CM

## 2020-11-09 DIAGNOSIS — K40.90 LEFT INGUINAL HERNIA: ICD-10-CM

## 2020-11-09 DIAGNOSIS — Z01.818 PRE-OP TESTING: ICD-10-CM

## 2020-11-09 LAB
EKG ATRIAL RATE: 67 BPM
EKG P AXIS: 4 DEGREES
EKG P-R INTERVAL: 142 MS
EKG Q-T INTERVAL: 378 MS
EKG QRS DURATION: 100 MS
EKG QTC CALCULATION (BAZETT): 399 MS
EKG R AXIS: -26 DEGREES
EKG T AXIS: -5 DEGREES
EKG VENTRICULAR RATE: 67 BPM

## 2020-11-09 PROCEDURE — 80048 BASIC METABOLIC PNL TOTAL CA: CPT

## 2020-11-09 PROCEDURE — 36415 COLL VENOUS BLD VENIPUNCTURE: CPT

## 2020-11-09 PROCEDURE — 93010 ELECTROCARDIOGRAM REPORT: CPT | Performed by: NUCLEAR MEDICINE

## 2020-11-09 PROCEDURE — 93005 ELECTROCARDIOGRAM TRACING: CPT | Performed by: SURGERY

## 2020-11-09 NOTE — TELEPHONE ENCOUNTER
Notified Shabnam Rios that his surgery Friday was moved up due to a cancellation in the schedule. He will arrive at 6am for surgery at 7:30. Charbel voiced understanding.

## 2020-11-10 PROBLEM — K40.90 LEFT INGUINAL HERNIA: Status: ACTIVE | Noted: 2020-11-10

## 2020-11-10 LAB
ANION GAP SERPL CALCULATED.3IONS-SCNC: 8 MEQ/L (ref 8–16)
BUN BLDV-MCNC: 11 MG/DL (ref 7–22)
CALCIUM SERPL-MCNC: 9.4 MG/DL (ref 8.5–10.5)
CHLORIDE BLD-SCNC: 104 MEQ/L (ref 98–111)
CO2: 27 MEQ/L (ref 23–33)
CREAT SERPL-MCNC: 0.8 MG/DL (ref 0.4–1.2)
GFR SERPL CREATININE-BSD FRML MDRD: > 90 ML/MIN/1.73M2
GLUCOSE BLD-MCNC: 106 MG/DL (ref 70–108)
POTASSIUM SERPL-SCNC: 4.1 MEQ/L (ref 3.5–5.2)
SODIUM BLD-SCNC: 139 MEQ/L (ref 135–145)

## 2020-11-10 NOTE — H&P
Marielle Castañeda MD [facility]  General Surgery  H and P for Surgery   Pt Name: Juany Gauthier  Date of Birth 1985   Today's Date: 11/10/2020  Medical Record Number: [id number]  Referring Provider: No ref. provider found  Primary Care Provider: Vinny Blunt DO  No chief complaint on file. ASSESSMENT      Problem List Items Addressed This Visit     None        Past Medical History:   Diagnosis Date    Dependence on nicotine from chewing tobacco 6/23/2015    Eczema 6/23/2015    GERD (gastroesophageal reflux disease)     History of asthma     Hypertension 9/30/2014    Irritable bowel syndrome with diarrhea     Migraine headache     NAFLD (nonalcoholic fatty liver disease)     Obesity (BMI 30-39. 9)     Rheumatoid arthritis ([facility] [address]     sees Dr Jennifer Guadalupe      1. Schedule Nicolasa Tapia for :  1. Robotic assisted left inguinal hernia possible right Dr. Dhaval De Paz  2. General anesthesia  3. Outpatient  1. In the office, I had a discussion with the patient regarding the risks of hernia surgery to include bleeding, infection, recurrence, injury to surrounding structures and continued pain in the area. We also discussed the use of mesh and its advantages and disadvantages. We discussed the anesthetic options, conduct of the operation, outpatient status, post op recovery and length of time off of work. After this discussion, the patient's questions were answered and has elected to proceed with surgical repair. (OPEN)  2. In the office, I had a discussion with the patient regarding the risks of hernia surgery to include bleeding, infection, recurrence, injury to surrounding structures, continued pain in the area and possible conversion to an open procedure . We also discussed the use of mesh and its advantages and disadvantages. We discussed the anesthetic options, conduct of the operation, outpatient status, post op recovery and length of time off of work.  After this discussion, the patient's questions were answered and has elected to proceed with surgical repair. (L/S)  3. Status: outpatient  4. Planned anesthesia: GEN  5. He will undergo pre-operative clearance per anesthesia guidelines with risk factors listed under the past medical history diagnosis & problem list.  6. Perioperative discontinuation of ASA, Plavix, warfarin, Brillinta, Effient, Pradaxa, Eliquis and Xarelto. Continuation of 81 mg Aspirin is acceptable. 7. Perioperative medical clearance is not required  The patient was counseled at length about the risks of francoise Covid-19 during their perioperative period and any recovery window from their procedure. The patient was made aware that francoise Covid-19  may worsen their prognosis for recovering from their procedure  and lend to a higher morbidity and/or mortality risk. All material risks, benefits, and reasonable alternatives including postponing the procedure were discussed. The patient does wish to proceed with the procedure at this time. 8.   Orders Placed This Encounter:  No orders of the defined types were placed in this encounter. Tae Crow is a 38 y.o. male seen in the office for the evaluation of a possible left inguinal hernia. When we saw him in the office on November 2 and it was not clear clinically on exam whether he had a hernia or not. He had a testicular and scrotal ultrasound but no comments were made about seeing any kind of hernia. We sent him for a left groin ultrasound to evaluate to see if there was a hernia present or not he is now back in the office after that test is completed for reevaluation and discussion. He states he began about 3 weeks ago with left groin pain. He states is present there all the time. He states it is worse when he is climbing steps and he goes to take the next step raising his leg. He cannot really say that he sees any bulge on that side. He thinks it looks a little denny compared to the other side. He was seen in his primary care doc's office and he was concerned enough about his pain that he sent her to the emergency department where they did a scrotal and testicular ultrasound which was reviewed and no comment is made about the groin or any kind of hernia. There are 2 pictures in it 1 this is with Valsalva and with other but I cannot really tell if anything was visualized with Valsalva on the left side or not. He has never had a hernia repair in the past.  We did review a CT scan from 2013 of his abdomen and pelvis and he did not have any evidence of hernia at that time but that was 7 years ago. No mention of a indirect defect was made at his laparoscopic appendectomy performed by me in 2013. We sent him for a dedicated left groin ultrasound to evaluate for hernia which revealed a small hernia:  Narrative    PROCEDURE: US EXTREMITY LEFT NON VASC LIMITED         CLINICAL INFORMATION: Left groin pain .         COMPARISON: No prior study.         TECHNIQUE: Grayscale and color Doppler ultrasound         FINDINGS: Small inguinal hernia measuring 1.7 x 2.0 cm. With Valsalva mesenteric fat can be seen protruding into the hernia. Normal nonenlarged lymph nodes are seen in the region.                   Impression    Left inguinal hernia as described     We talked about continued observation versus repair leaving it up to him to make the decision depending on how is bothering him. He states it still bothers him regularly and he would like to proceed with repair. Past Medical History  Past Medical History:   Diagnosis Date    Dependence on nicotine from chewing tobacco 6/23/2015    Eczema 6/23/2015    GERD (gastroesophageal reflux disease)     History of asthma     Hypertension 9/30/2014    Irritable bowel syndrome with diarrhea     Migraine headache     NAFLD (nonalcoholic fatty liver disease)     Obesity (BMI 30-39. 9)     Rheumatoid arthritis ([facility] [address]     sees Dr Amita Wharton Smoker        Past Surgical History  Past Surgical History:   Procedure Laterality Date    APPENDECTOMY  05/02/2013    Dr. Raymond Porras COLONOSCOPY  2015    [id number]  2018, 2019    [address] SURGERY  10/17/15    left pinky finger    LAPAROSCOPIC APPENDECTOMY  05/02/2013    Dr. Natalia Marin ENDOSCOPY  2017    [facility] Medications  No current facility-administered medications on file prior to encounter. Current Outpatient Medications on File Prior to Encounter   Medication Sig Dispense Refill    Methotrexate, PF, 12.5 MG/0.4ML SOAJ Inject 15 mg into the skin once a week       Folic Acid 5 MG CAPS Take 5 mg by mouth daily      lisinopril-hydroCHLOROthiazide (PRINZIDE) 20-12.5 MG per tablet Take 1 tablet by mouth daily 90 tablet 3    omeprazole (PRILOSEC) 20 MG delayed release capsule Take 1 capsule by mouth daily 90 capsule 3    meloxicam (MOBIC) 15 MG tablet Take 1 tablet by mouth daily as needed for Pain 30 tablet 3     Allergies  No Known Allergies    Family History  Family History   Problem Relation Age of Onset    Heart Disease Mother 52        MI    Early Death Father         MVA    Prostate Cancer Paternal Grandfather         unsure age   Claudia Mchugh High Blood Pressure Other         Pt unsure if anyone in family does.      Colon Cancer Neg Hx        Social History  Social History     Socioeconomic History    Marital status:      Spouse name: Not on file    Number of children: Not on file    Years of education: Not on file    Highest education level: Not on file   Occupational History    Not on file   Social Needs    Financial resource strain: Not on file    Food insecurity     Worry: Not on file     Inability: Not on file    Transportation needs     Medical: Not on file     Non-medical: Not on file   Tobacco Use    Smoking status: Current Every Day Smoker     Packs/day: 0.75     Years: 12.00     Pack years: 9.00     Types: Cigarettes    Smokeless tobacco: Former User     Types: Chew   Substance and Sexual Activity    Alcohol use: No     Comment: rare    Drug use: No    Sexual activity: Not on file   Lifestyle    Physical activity     Days per week: Not on file     Minutes per session: Not on file    Stress: Not on file   Relationships    Social connections     Talks on phone: Not on file     Gets together: Not on file     Attends Religion service: Not on file     Active member of club or organization: Not on file     Attends meetings of clubs or organizations: Not on file     Relationship status: Not on file    Intimate partner violence     Fear of current or ex partner: Not on file     Emotionally abused: Not on file     Physically abused: Not on file     Forced sexual activity: Not on file   Other Topics Concern    Not on file   Social History Narrative    Not on file       [address] Maintenance   Topic Date Due    Varicella vaccine (1 of 2 - 2-dose childhood series) 09/15/1985    Pneumococcal 0-64 years Vaccine (1 of 1 - PPSV23) 09/15/1990    HIV screen  09/15/1999    Flu vaccine (1) 09/01/2020    Potassium monitoring  11/09/2021    Creatinine monitoring  11/09/2021    DTaP/Tdap/Td vaccine (2 - Td) 10/17/2025    Hepatitis A vaccine  Aged Out    Hepatitis B vaccine  Aged Out    Hib vaccine  Aged Out    Meningococcal (ACWY) vaccine  Aged Out       Review of Systems  Constitutional: Negative for activity change, appetite change, chills, diaphoresis, fatigue, fever and unexpected weight change. HENT: Negative for congestion, dental problem, drooling, ear discharge, ear pain, facial swelling, hearing loss, mouth sores, nosebleeds, postnasal drip, rhinorrhea, sinus pressure, sinus pain, sneezing, sore throat, tinnitus, trouble swallowing and voice change. Eyes: Negative for photophobia, pain, discharge, redness, itching and visual disturbance.    Respiratory: Negative for apnea, cough, choking, chest tightness, shortness of breath, wheezing and stridor. Cardiovascular: Negative for chest pain, palpitations and leg swelling. Gastrointestinal: Positive for abdominal pain (lower abd/ groin). Negative for abdominal distention, anal bleeding, blood in stool, constipation, diarrhea, nausea, rectal pain and vomiting. Endocrine: Negative for cold intolerance, heat intolerance, polydipsia, polyphagia and polyuria. Genitourinary: Positive for frequency, scrotal swelling and testicular pain. Negative for decreased urine volume, difficulty urinating, discharge, dysuria, enuresis, flank pain, genital sores, hematuria, penile pain, penile swelling and urgency. Musculoskeletal: Negative for arthralgias, back pain, gait problem, joint swelling, myalgias, neck pain and neck stiffness. Skin: Negative for color change, pallor, rash and wound. Allergic/Immunologic: Negative for environmental allergies, food allergies and immunocompromised state. Neurological: Negative for dizziness, tremors, seizures, syncope, facial asymmetry, speech difficulty, weakness, light-headedness, numbness and headaches. Hematological: Negative for adenopathy. Does not bruise/bleed easily. Psychiatric/Behavioral: Negative for agitation, behavioral problems, confusion, decreased concentration, dysphoric mood, hallucinations, self-injury, sleep disturbance and suicidal ideas. The patient is not nervous/anxious and is not hyperactive      OBJECTIVE    VITALS:  vitals were not taken for this visit. CONSTITUTIONAL: Alert and oriented times 3, no acute distress and cooperative to examination with proper mood and affect. SKIN: Skin color, texture, turgor normal. No rashes or lesions. LYMPH: no inguinal nodes  HEENT: Head is normocephalic, atraumatic. EOMI, PERRLA.   NECK: Supple, symmetrical, trachea midline, no adenopathy, thyroid symmetric, not enlarged and no tenderness, skin normal.  CHEST/LUNGS: chest symmetric with normal A/P diameter, normal respiratory rate and rhythm, lungs clear to auscultation without wheezes, rales or rhonchi. No accessory muscle use. Scars None   CARDIOVASCULAR: Heart sounds are normal.  Regular rate and rhythm without murmur, gallop or rub. Normal S1 and S2. . Carotid and femoral pulses 2+/4 and equal bilaterally. ABDOMEN: Normal shape. Laparoscopic scar(s) present. Normal bowel sounds. No bruits. Hitesh Dave \"soft, nontender, nondistended, no masses or organomegaly. Tender in the left groin area no obvious visible bulge noted. The cord structures are soon as I touch him he can oppose the way. The cord structures feel little thicker on the left than the right but with cough I can feel no transmitted vibration or nothing coming down with sac and version cannot really feel anything coming through the external ring. RECTAL: deferred, not clinically indicated  NEUROLOGIC: There are no focalizing motor or sensory deficits. CN II-XII are grossly intact. Hitesh Dave EXTREMITIES: no cyanosis, no clubbing and no edema.       .         Electronically signed by Shayy Lehman MD on 11/10/2020 at 8:37 AM

## 2020-11-12 NOTE — PROGRESS NOTES
Patient contacted regarding COVID-19 screen. Test was done on [facility]  at 11-6-2020  Following questions asked: In the last month, have you been in contact with someone who was confirmed or suspected to have Coronavirus/COVID-19:  Patient stated NO      Pt was informed can be a visitor allowed. Please bring masks. Do you or family members have any of the following symptoms:  Cough-no   Muscle pain-no   Shortness of breath-no   Fever-no   Weakness-no  Severe headache-no   Sore throat-no   Respiratory symptoms-no    Have you traveled internationally in the last month?  No     Have you been to the emergency room recently-no

## 2020-11-13 ENCOUNTER — HOSPITAL ENCOUNTER (OUTPATIENT)
Age: 35
Setting detail: OUTPATIENT SURGERY
Discharge: HOME | End: 2020-11-13
Attending: SURGERY | Admitting: SURGERY
Payer: COMMERCIAL

## 2020-11-13 ENCOUNTER — ANESTHESIA EVENT (OUTPATIENT)
Dept: OPERATING ROOM | Age: 35
End: 2020-11-13
Payer: COMMERCIAL

## 2020-11-13 ENCOUNTER — ANESTHESIA (OUTPATIENT)
Dept: OPERATING ROOM | Age: 35
End: 2020-11-13
Payer: COMMERCIAL

## 2020-11-13 VITALS — SYSTOLIC BLOOD PRESSURE: 95 MMHG | DIASTOLIC BLOOD PRESSURE: 52 MMHG | OXYGEN SATURATION: 99 % | TEMPERATURE: 96.6 F

## 2020-11-13 VITALS
TEMPERATURE: 97.4 F | OXYGEN SATURATION: 95 % | DIASTOLIC BLOOD PRESSURE: 65 MMHG | HEART RATE: 79 BPM | SYSTOLIC BLOOD PRESSURE: 109 MMHG | RESPIRATION RATE: 16 BRPM

## 2020-11-13 PROBLEM — Z98.890 S/P LAPAROSCOPIC HERNIA REPAIR: Status: ACTIVE | Noted: 2020-11-13

## 2020-11-13 PROBLEM — Z87.19 S/P LAPAROSCOPIC HERNIA REPAIR: Status: ACTIVE | Noted: 2020-11-13

## 2020-11-13 LAB — POTASSIUM SERPL-SCNC: 3.9 MEQ/L (ref 3.5–5.2)

## 2020-11-13 PROCEDURE — 7100000011 HC PHASE II RECOVERY - ADDTL 15 MIN: Performed by: SURGERY

## 2020-11-13 PROCEDURE — 6360000002 HC RX W HCPCS: Performed by: ANESTHESIOLOGY

## 2020-11-13 PROCEDURE — 2709999900 HC NON-CHARGEABLE SUPPLY: Performed by: SURGERY

## 2020-11-13 PROCEDURE — 36415 COLL VENOUS BLD VENIPUNCTURE: CPT

## 2020-11-13 PROCEDURE — 3700000000 HC ANESTHESIA ATTENDED CARE: Performed by: SURGERY

## 2020-11-13 PROCEDURE — 6370000000 HC RX 637 (ALT 250 FOR IP)

## 2020-11-13 PROCEDURE — 3700000001 HC ADD 15 MINUTES (ANESTHESIA): Performed by: SURGERY

## 2020-11-13 PROCEDURE — 7100000001 HC PACU RECOVERY - ADDTL 15 MIN: Performed by: SURGERY

## 2020-11-13 PROCEDURE — 7100000000 HC PACU RECOVERY - FIRST 15 MIN: Performed by: SURGERY

## 2020-11-13 PROCEDURE — 7100000010 HC PHASE II RECOVERY - FIRST 15 MIN: Performed by: SURGERY

## 2020-11-13 PROCEDURE — 3600000019 HC SURGERY ROBOT ADDTL 15MIN: Performed by: SURGERY

## 2020-11-13 PROCEDURE — 6360000002 HC RX W HCPCS: Performed by: NURSE ANESTHETIST, CERTIFIED REGISTERED

## 2020-11-13 PROCEDURE — 84132 ASSAY OF SERUM POTASSIUM: CPT

## 2020-11-13 PROCEDURE — 2580000003 HC RX 258: Performed by: NURSE ANESTHETIST, CERTIFIED REGISTERED

## 2020-11-13 PROCEDURE — C1781 MESH (IMPLANTABLE): HCPCS | Performed by: SURGERY

## 2020-11-13 PROCEDURE — 49650 LAP ING HERNIA REPAIR INIT: CPT | Performed by: SURGERY

## 2020-11-13 PROCEDURE — 6360000002 HC RX W HCPCS: Performed by: SURGERY

## 2020-11-13 PROCEDURE — 6360000002 HC RX W HCPCS

## 2020-11-13 PROCEDURE — 2500000003 HC RX 250 WO HCPCS: Performed by: SURGERY

## 2020-11-13 PROCEDURE — 3600000009 HC SURGERY ROBOT BASE: Performed by: SURGERY

## 2020-11-13 PROCEDURE — S2900 ROBOTIC SURGICAL SYSTEM: HCPCS | Performed by: SURGERY

## 2020-11-13 PROCEDURE — 2500000003 HC RX 250 WO HCPCS: Performed by: NURSE ANESTHETIST, CERTIFIED REGISTERED

## 2020-11-13 PROCEDURE — 2580000003 HC RX 258: Performed by: SURGERY

## 2020-11-13 DEVICE — MESH HERN W10XL15CM POLY POLYLACTIC ACID 70% CLLGN 30% GLYC: Type: IMPLANTABLE DEVICE | Site: GROIN | Status: FUNCTIONAL

## 2020-11-13 RX ORDER — MORPHINE SULFATE 2 MG/ML
2 INJECTION, SOLUTION INTRAMUSCULAR; INTRAVENOUS
Status: CANCELLED | OUTPATIENT
Start: 2020-11-13

## 2020-11-13 RX ORDER — MEPERIDINE HYDROCHLORIDE 25 MG/ML
12.5 INJECTION INTRAMUSCULAR; INTRAVENOUS; SUBCUTANEOUS EVERY 5 MIN PRN
Status: DISCONTINUED | OUTPATIENT
Start: 2020-11-13 | End: 2020-11-13 | Stop reason: HOSPADM

## 2020-11-13 RX ORDER — HYDROCODONE BITARTRATE AND ACETAMINOPHEN 5; 325 MG/1; MG/1
1 TABLET ORAL EVERY 6 HOURS PRN
Qty: 28 TABLET | Refills: 0 | Status: SHIPPED | OUTPATIENT
Start: 2020-11-13 | End: 2020-11-20

## 2020-11-13 RX ORDER — HYDROCODONE BITARTRATE AND ACETAMINOPHEN 5; 325 MG/1; MG/1
1 TABLET ORAL EVERY 4 HOURS PRN
Status: DISCONTINUED | OUTPATIENT
Start: 2020-11-13 | End: 2020-11-13 | Stop reason: HOSPADM

## 2020-11-13 RX ORDER — SODIUM CHLORIDE 9 MG/ML
INJECTION, SOLUTION INTRAVENOUS CONTINUOUS
Status: DISCONTINUED | OUTPATIENT
Start: 2020-11-13 | End: 2020-11-13 | Stop reason: HOSPADM

## 2020-11-13 RX ORDER — FENTANYL CITRATE 50 UG/ML
25 INJECTION, SOLUTION INTRAMUSCULAR; INTRAVENOUS EVERY 5 MIN PRN
Status: DISCONTINUED | OUTPATIENT
Start: 2020-11-13 | End: 2020-11-13 | Stop reason: HOSPADM

## 2020-11-13 RX ORDER — LIDOCAINE HYDROCHLORIDE 20 MG/ML
INJECTION, SOLUTION INFILTRATION; PERINEURAL PRN
Status: DISCONTINUED | OUTPATIENT
Start: 2020-11-13 | End: 2020-11-13 | Stop reason: SDUPTHER

## 2020-11-13 RX ORDER — DEXAMETHASONE SODIUM PHOSPHATE 4 MG/ML
INJECTION, SOLUTION INTRA-ARTICULAR; INTRALESIONAL; INTRAMUSCULAR; INTRAVENOUS; SOFT TISSUE
Status: COMPLETED
Start: 2020-11-13 | End: 2020-11-13

## 2020-11-13 RX ORDER — IBUPROFEN 400 MG/1
TABLET ORAL
Status: DISCONTINUED
Start: 2020-11-13 | End: 2020-11-13 | Stop reason: HOSPADM

## 2020-11-13 RX ORDER — BUPIVACAINE HYDROCHLORIDE 5 MG/ML
INJECTION, SOLUTION PERINEURAL PRN
Status: DISCONTINUED | OUTPATIENT
Start: 2020-11-13 | End: 2020-11-13 | Stop reason: ALTCHOICE

## 2020-11-13 RX ORDER — FENTANYL CITRATE 50 UG/ML
50 INJECTION, SOLUTION INTRAMUSCULAR; INTRAVENOUS EVERY 5 MIN PRN
Status: DISCONTINUED | OUTPATIENT
Start: 2020-11-13 | End: 2020-11-13 | Stop reason: HOSPADM

## 2020-11-13 RX ORDER — MORPHINE SULFATE 2 MG/ML
4 INJECTION, SOLUTION INTRAMUSCULAR; INTRAVENOUS
Status: CANCELLED | OUTPATIENT
Start: 2020-11-13

## 2020-11-13 RX ORDER — FENTANYL CITRATE 50 UG/ML
INJECTION, SOLUTION INTRAMUSCULAR; INTRAVENOUS PRN
Status: DISCONTINUED | OUTPATIENT
Start: 2020-11-13 | End: 2020-11-13 | Stop reason: SDUPTHER

## 2020-11-13 RX ORDER — SODIUM CHLORIDE 0.9 % (FLUSH) 0.9 %
10 SYRINGE (ML) INJECTION EVERY 12 HOURS SCHEDULED
Status: DISCONTINUED | OUTPATIENT
Start: 2020-11-13 | End: 2020-11-13 | Stop reason: HOSPADM

## 2020-11-13 RX ORDER — LABETALOL 20 MG/4 ML (5 MG/ML) INTRAVENOUS SYRINGE
5 EVERY 10 MIN PRN
Status: DISCONTINUED | OUTPATIENT
Start: 2020-11-13 | End: 2020-11-13 | Stop reason: HOSPADM

## 2020-11-13 RX ORDER — PROMETHAZINE HYDROCHLORIDE 25 MG/ML
12.5 INJECTION, SOLUTION INTRAMUSCULAR; INTRAVENOUS
Status: DISCONTINUED | OUTPATIENT
Start: 2020-11-13 | End: 2020-11-13 | Stop reason: HOSPADM

## 2020-11-13 RX ORDER — IBUPROFEN 400 MG/1
800 TABLET ORAL ONCE
Status: DISCONTINUED | OUTPATIENT
Start: 2020-11-13 | End: 2020-11-13 | Stop reason: HOSPADM

## 2020-11-13 RX ORDER — DEXAMETHASONE SODIUM PHOSPHATE 4 MG/ML
8 INJECTION, SOLUTION INTRA-ARTICULAR; INTRALESIONAL; INTRAMUSCULAR; INTRAVENOUS; SOFT TISSUE ONCE
Status: COMPLETED | OUTPATIENT
Start: 2020-11-13 | End: 2020-11-13

## 2020-11-13 RX ORDER — NEOSTIGMINE METHYLSULFATE 5 MG/5 ML
SYRINGE (ML) INTRAVENOUS PRN
Status: DISCONTINUED | OUTPATIENT
Start: 2020-11-13 | End: 2020-11-13 | Stop reason: SDUPTHER

## 2020-11-13 RX ORDER — HYDROCODONE BITARTRATE AND ACETAMINOPHEN 5; 325 MG/1; MG/1
2 TABLET ORAL EVERY 4 HOURS PRN
Status: DISCONTINUED | OUTPATIENT
Start: 2020-11-13 | End: 2020-11-13 | Stop reason: HOSPADM

## 2020-11-13 RX ORDER — ONDANSETRON 2 MG/ML
INJECTION INTRAMUSCULAR; INTRAVENOUS PRN
Status: DISCONTINUED | OUTPATIENT
Start: 2020-11-13 | End: 2020-11-13 | Stop reason: SDUPTHER

## 2020-11-13 RX ORDER — PROPOFOL 10 MG/ML
INJECTION, EMULSION INTRAVENOUS PRN
Status: DISCONTINUED | OUTPATIENT
Start: 2020-11-13 | End: 2020-11-13 | Stop reason: SDUPTHER

## 2020-11-13 RX ORDER — GLYCOPYRROLATE 1 MG/5 ML
SYRINGE (ML) INTRAVENOUS PRN
Status: DISCONTINUED | OUTPATIENT
Start: 2020-11-13 | End: 2020-11-13 | Stop reason: SDUPTHER

## 2020-11-13 RX ORDER — ACETAMINOPHEN 500 MG
1000 TABLET ORAL ONCE
Status: COMPLETED | OUTPATIENT
Start: 2020-11-13 | End: 2020-11-13

## 2020-11-13 RX ORDER — SODIUM CHLORIDE 0.9 % (FLUSH) 0.9 %
10 SYRINGE (ML) INJECTION PRN
Status: DISCONTINUED | OUTPATIENT
Start: 2020-11-13 | End: 2020-11-13 | Stop reason: HOSPADM

## 2020-11-13 RX ORDER — ACETAMINOPHEN 500 MG
TABLET ORAL
Status: COMPLETED
Start: 2020-11-13 | End: 2020-11-13

## 2020-11-13 RX ORDER — DEXAMETHASONE SODIUM PHOSPHATE 4 MG/ML
INJECTION, SOLUTION INTRA-ARTICULAR; INTRALESIONAL; INTRAMUSCULAR; INTRAVENOUS; SOFT TISSUE PRN
Status: DISCONTINUED | OUTPATIENT
Start: 2020-11-13 | End: 2020-11-13 | Stop reason: SDUPTHER

## 2020-11-13 RX ORDER — SODIUM CHLORIDE, SODIUM LACTATE, POTASSIUM CHLORIDE, CALCIUM CHLORIDE 600; 310; 30; 20 MG/100ML; MG/100ML; MG/100ML; MG/100ML
INJECTION, SOLUTION INTRAVENOUS CONTINUOUS PRN
Status: DISCONTINUED | OUTPATIENT
Start: 2020-11-13 | End: 2020-11-13 | Stop reason: SDUPTHER

## 2020-11-13 RX ORDER — ROCURONIUM BROMIDE 10 MG/ML
INJECTION, SOLUTION INTRAVENOUS PRN
Status: DISCONTINUED | OUTPATIENT
Start: 2020-11-13 | End: 2020-11-13 | Stop reason: SDUPTHER

## 2020-11-13 RX ADMIN — FENTANYL CITRATE 100 MCG: 50 INJECTION, SOLUTION INTRAMUSCULAR; INTRAVENOUS at 07:33

## 2020-11-13 RX ADMIN — PROPOFOL 160 MG: 10 INJECTION, EMULSION INTRAVENOUS at 07:33

## 2020-11-13 RX ADMIN — SODIUM CHLORIDE: 9 INJECTION, SOLUTION INTRAVENOUS at 06:33

## 2020-11-13 RX ADMIN — FENTANYL CITRATE 100 MCG: 50 INJECTION, SOLUTION INTRAMUSCULAR; INTRAVENOUS at 07:40

## 2020-11-13 RX ADMIN — ROCURONIUM BROMIDE 40 MG: 10 INJECTION INTRAVENOUS at 07:33

## 2020-11-13 RX ADMIN — DEXAMETHASONE SODIUM PHOSPHATE 8 MG: 4 INJECTION, SOLUTION INTRA-ARTICULAR; INTRALESIONAL; INTRAMUSCULAR; INTRAVENOUS; SOFT TISSUE at 06:45

## 2020-11-13 RX ADMIN — FENTANYL CITRATE 50 MCG: 50 INJECTION, SOLUTION INTRAMUSCULAR; INTRAVENOUS at 09:37

## 2020-11-13 RX ADMIN — LIDOCAINE HYDROCHLORIDE 100 MG: 20 INJECTION, SOLUTION INFILTRATION; PERINEURAL at 07:33

## 2020-11-13 RX ADMIN — SODIUM CHLORIDE, POTASSIUM CHLORIDE, SODIUM LACTATE AND CALCIUM CHLORIDE: 600; 310; 30; 20 INJECTION, SOLUTION INTRAVENOUS at 08:19

## 2020-11-13 RX ADMIN — ONDANSETRON HYDROCHLORIDE 4 MG: 4 INJECTION, SOLUTION INTRAMUSCULAR; INTRAVENOUS at 07:44

## 2020-11-13 RX ADMIN — FENTANYL CITRATE 50 MCG: 50 INJECTION, SOLUTION INTRAMUSCULAR; INTRAVENOUS at 09:30

## 2020-11-13 RX ADMIN — CEFAZOLIN 2 G: 10 INJECTION, POWDER, FOR SOLUTION INTRAVENOUS at 07:37

## 2020-11-13 RX ADMIN — HYDROMORPHONE HYDROCHLORIDE 0.5 MG: 1 INJECTION, SOLUTION INTRAMUSCULAR; INTRAVENOUS; SUBCUTANEOUS at 09:47

## 2020-11-13 RX ADMIN — DEXAMETHASONE SODIUM PHOSPHATE 8 MG: 4 INJECTION, SOLUTION INTRAMUSCULAR; INTRAVENOUS at 06:45

## 2020-11-13 RX ADMIN — Medication 0.8 MG: at 08:30

## 2020-11-13 RX ADMIN — ACETAMINOPHEN 1000 MG: 500 TABLET ORAL at 06:45

## 2020-11-13 RX ADMIN — DEXAMETHASONE SODIUM PHOSPHATE 8 MG: 4 INJECTION, SOLUTION INTRAMUSCULAR; INTRAVENOUS at 07:44

## 2020-11-13 RX ADMIN — Medication 1000 MG: at 06:45

## 2020-11-13 RX ADMIN — Medication 5 MG: at 08:30

## 2020-11-13 ASSESSMENT — PAIN SCALES - GENERAL
PAINLEVEL_OUTOF10: 9
PAINLEVEL_OUTOF10: 10
PAINLEVEL_OUTOF10: 5
PAINLEVEL_OUTOF10: 9
PAINLEVEL_OUTOF10: 0
PAINLEVEL_OUTOF10: 5
PAINLEVEL_OUTOF10: 6

## 2020-11-13 ASSESSMENT — PULMONARY FUNCTION TESTS
PIF_VALUE: 2
PIF_VALUE: 17
PIF_VALUE: 24
PIF_VALUE: 19
PIF_VALUE: 19
PIF_VALUE: 27
PIF_VALUE: 21
PIF_VALUE: 7
PIF_VALUE: 24
PIF_VALUE: 24
PIF_VALUE: 23
PIF_VALUE: 17
PIF_VALUE: 27
PIF_VALUE: 0
PIF_VALUE: 27
PIF_VALUE: 26
PIF_VALUE: 19
PIF_VALUE: 27
PIF_VALUE: 19
PIF_VALUE: 25
PIF_VALUE: 19
PIF_VALUE: 26
PIF_VALUE: 3
PIF_VALUE: 27
PIF_VALUE: 16
PIF_VALUE: 2

## 2020-11-13 ASSESSMENT — PAIN DESCRIPTION - PAIN TYPE
TYPE: SURGICAL PAIN
TYPE: SURGICAL PAIN

## 2020-11-13 ASSESSMENT — PAIN DESCRIPTION - ORIENTATION
ORIENTATION: MID
ORIENTATION: MID

## 2020-11-13 ASSESSMENT — PAIN DESCRIPTION - PROGRESSION: CLINICAL_PROGRESSION: GRADUALLY IMPROVING

## 2020-11-13 ASSESSMENT — PAIN DESCRIPTION - LOCATION
LOCATION: ABDOMEN
LOCATION: ABDOMEN

## 2020-11-13 ASSESSMENT — PAIN DESCRIPTION - FREQUENCY: FREQUENCY: CONTINUOUS

## 2020-11-13 ASSESSMENT — PAIN DESCRIPTION - DESCRIPTORS
DESCRIPTORS: DISCOMFORT
DESCRIPTORS: DISCOMFORT

## 2020-11-13 NOTE — OP NOTE
the [employer] robot was brought in and docked. [place] scissors placed in the right hand grasp in the left hand. The hernia was identified on the left and starting medially at the median umbilical ligament the peritoneum was incised here and allowing the CO2 to help dissect a plane a peritoneal flap was developed starting well above the hernia defect starting from medial to lateral lateral as far as the anterior superior iliac spine. Staying right on the peritoneum we started medially first working her way down to the inferior pubic ramus and the pubic tubercle these were dissected out and seen. We bluntly dissected out the space below for about 2 cm for the inferior edge of the landing zone for the mesh. We then stayed on the peritoneum working laterally dissecting down a rent in the peritoneum and dissecting this out widely. We then worked our way medially to the hernia defect inferior epigastric vessels were identified protected from harm we continued dissect out the defect and the hernia sac was identified and then was dissected out starting on the medial side we worked identifying the structures on this side as well as lateral and then teased the cord structures away from the hernia sac. The entire hernia sac was dissected free and upward to the point where actually able to invert it. Continued then developing the space dissecting well below the internal ring and down the vas deferens and cord structures. At this point a 10 x 15 cm pro- mesh was brought into the field it was then placed in this preperitoneal location and then deployed rolling from the bottom up to overlap with the defect it overlapped below 1 cm below the inferior pubic ramus edge. It is overlapped well lateral and medial it was over to the pubic tubercle was then rolled out in the space overlying it.   At this point using 2-0 Vicryl the mesh was secured along the inferior pubic ramus as well as anterior abdominal wall medially. The mesh laid out nicely and at this point I then lifted up on the peritoneal flaps to relate reapproximation and the mesh did not roll up or lift up and stayed in good position at this point using a running 2-0 V lock suture, the peritoneum was reapproximated to cover the mesh. At this point both needles were retrieved the robotic instruments were then removed and then the robotic ports were removed as well. The abdomen was then desufflated. 30 cc half sent Marcaine plain was injected around the port sites. Skin incisions were then closed with a 4-0 Vicryl running subcuticular stitch. Skin affix glue was applied as the dressing. Sponge and needle count was correct. Patient was taken recovery room stable condition.

## 2020-11-13 NOTE — H&P
[facility]  History and Physical Update    Pt Name: Alicja Ramirez  MRN: [id number]  YOB: 1985  Date of evaluation: 11/13/2020    [x] I have examined the patient and reviewed the H&P/Consult and there are no changes to the patient or plans.     [] I have examined the patient and reviewed the H&P/Consult and have noted the following changes:        Sumeet Rubio  Electronically signed 11/13/2020 at 6:12 AM

## 2020-11-13 NOTE — ANESTHESIA POSTPROCEDURE EVALUATION
Department of Anesthesiology  Postprocedure Note    Patient: Alicja Ramirez  MRN: [id number]  YOB: 1985  Date of evaluation: 11/13/2020  Time:  10:44 AM     Procedure Summary     Date:  11/13/20 Room / Location:  [address] [place] [address] Yulia    Anesthesia Start:  [facility] Anesthesia Stop:  0848    Procedure:  ROBOTIC LEFT INGUINAL HERNIA REPAIR, with mesh (Left Abdomen) Diagnosis:  (LEFT INGUINAL HERNIA)    Surgeon:  Sumeet Rubio MD Responsible Provider:  Shan Irvin DO    Anesthesia Type:  general ASA Status:  2          Anesthesia Type: general    Conner Phase I: Conner Score: 10    Conner Phase II:      Last vitals: Reviewed and per EMR flowsheets.        Anesthesia Post Evaluation    Patient location during evaluation: PACU  Patient participation: complete - patient participated  Level of consciousness: awake  Airway patency: patent  Nausea & Vomiting: no nausea and no vomiting  Complications: no  Cardiovascular status: hemodynamically stable  Respiratory status: acceptable  Hydration status: stable

## 2020-11-13 NOTE — PROGRESS NOTES
Pt ambulated to restroom and voided. States pain is 10/10 but wants no pain medication. Pt sitting at beside dressing for discharge with the assistance of visitor.

## 2020-11-16 ENCOUNTER — TELEPHONE (OUTPATIENT)
Dept: SURGERY | Age: 35
End: 2020-11-16

## 2020-11-16 NOTE — TELEPHONE ENCOUNTER
Robotic assisted inguinal hernia repair with mesh s/p 11/13    Pt states incisions are clean, dry and intact. Pt states he is using the bathroom okay, he is up and moving around okay, and eating okay. Pt denies any questions or concerns at this time. Advised to call the office if needed.

## 2020-11-24 NOTE — PROGRESS NOTES
Alma Franco MD [facility]  General Surgery  Postprocedure Evaluation in Office  Pt Name: Sue Lima  Date of Birth 1985   Today's Date: 11/30/2020  Medical Record Number: [id number]  Referring Provider: No ref. provider found  Primary Care Provider: Hilario Espinosa DO  Chief Complaint   Patient presents with    Post-Op Check     s/p Robotic Assisted FABIAN LEFT inguinal hernia repair 10cm x 15 cm progrip mesh-11/13/2020     ASSESSMENT      Problem List Items Addressed This Visit     S/P robotic assisted LEFT inguinal hernia repair - Primary           PLANS       Pathology reviewed with the patient who understands. All questions were answered. New Prescriptions    No medications on file     Patient Instructions   May return to full activity without restrictions. Follow up: Return if symptoms worsen or fail to improve. Orders Placed This Encounter:  No orders of the defined types were placed in this encounter. Ashanti Bal is seen today for post-op follow-up. He is 16 day(s) status post Left robotic assisted FABIAN inguinal hernia repair. He is tolerating a regular diet, having regular bowel movements. Symptoms and activity have gradually improved compared to preoperative. The surgical site is clean and has no drainage. Pain is controlled without any medications. . He has compliant with postoperative instructions. Past Medical History  Past Medical History:   Diagnosis Date    Dependence on nicotine from chewing tobacco 6/23/2015    Eczema 6/23/2015    GERD (gastroesophageal reflux disease)     History of asthma     Hypertension 9/30/2014    Irritable bowel syndrome with diarrhea     Migraine headache     NAFLD (nonalcoholic fatty liver disease)     Obesity (BMI 30-39. 9)     [facility]     sees Dr Amita Wharton Smoker      Past Surgical History  Past Surgical History:   Procedure Laterality Date    APPENDECTOMY  05/02/2013    Dr. Alok Romano 2015    [facility]  2018, 2019    [address] SURGERY  10/17/15    left pinky finger    HERNIA REPAIR Left 11/13/2020    ROBOTIC LEFT INGUINAL HERNIA REPAIR, with mesh performed by Carlos Paez MD at [address]  05/02/2013    Dr. Salinas Ortega ENDOSCOPY  2017    [facility] Medications  Current Outpatient Medications on File Prior to Visit   Medication Sig Dispense Refill    Methotrexate, PF, 12.5 MG/0.4ML SOAJ Inject 15 mg into the skin once a week       Folic Acid 5 MG CAPS Take 5 mg by mouth daily      lisinopril-hydroCHLOROthiazide (PRINZIDE) 20-12.5 MG per tablet Take 1 tablet by mouth daily 90 tablet 3    omeprazole (PRILOSEC) 20 MG delayed release capsule Take 1 capsule by mouth daily 90 capsule 3    meloxicam (MOBIC) 15 MG tablet Take 1 tablet by mouth daily as needed for Pain 30 tablet 3     No current facility-administered medications on file prior to visit.       Allergies  Allergies   Allergen Reactions    Plaquenil [Hydroxychloroquine Sulfate] Nausea And Vomiting     Social History  Social History     Socioeconomic History    Marital status:      Spouse name: Not on file    Number of children: Not on file    Years of education: Not on file    Highest education level: Not on file   Occupational History    Not on file   Social Needs    Financial resource strain: Not on file    Food insecurity     Worry: Not on file     Inability: Not on file    Transportation needs     Medical: Not on file     Non-medical: Not on file   Tobacco Use    Smoking status: Current Every Day Smoker     Packs/day: 1.00     Years: 12.00     Pack years: 12.00     Types: Cigarettes    Smokeless tobacco: Former User     Types: Chew   Substance and Sexual Activity    Alcohol use: No     Comment: rare    Drug use: No    Sexual activity: Not on file   Lifestyle    Physical activity     Days per week: Not on file     Minutes per session: Not on file    Stress: Not on file   Relationships    Social connections     Talks on phone: Not on file     Gets together: Not on file     Attends [employer] service: Not on file     Active member of club or organization: Not on file     Attends meetings of clubs or organizations: Not on file     Relationship status: Not on file    Intimate partner violence     Fear of current or ex partner: Not on file     Emotionally abused: Not on file     Physically abused: Not on file     Forced sexual activity: Not on file   Other Topics Concern    Not on file   Social History Narrative    Not on file     [address] Maintenance   Topic Date Due    Varicella vaccine (1 of 2 - 2-dose childhood series) 09/15/1985    Pneumococcal 0-64 years Vaccine (1 of 1 - PPSV23) 09/15/1990    HIV screen  09/15/1999    Flu vaccine (1) 09/01/2020    Creatinine monitoring  11/09/2021    Potassium monitoring  11/13/2021    DTaP/Tdap/Td vaccine (2 - Td) 10/17/2025    Hepatitis A vaccine  Aged Out    Hepatitis B vaccine  Aged Out    Hib vaccine  Aged Out    Meningococcal (ACWY) vaccine  Aged Out     Review of Systems  History obtained from the patient. Constitutional: Denies any fever, chills, fatigue. Wound: Denies any rash, skin color changes or wound problems. Resp: Denies any cough, shortness of breath. CV: Denies any chest pain, orthopnea or syncope. GI: Positive for incisional discomfort only. Denies any nausea, vomiting, blood in the stool, constipation or diarrhea. : Denies any hematuria, hesitancy or dysuria. OBJECTIVE    VITALS:  height is 5' 9\" (1.753 m) and weight is 213 lb (96.6 kg). His temporal temperature is 97.2 °F (36.2 °C). His blood pressure is 118/60 and his pulse is 89. His respiration is 18 and oxygen saturation is 97%. CONSTITUTIONAL: Alert and oriented times 3, no acute distress and cooperative to examination.   SKIN: Skin color, texture, turgor normal. No rashes or lesions. INCISION: wound margins intact and healing well. No signs of infection. No drainage. ABDOMEN: soft, nontender, nondistended, no masses or organomegaly. Bowel sounds normal. Laparoscopic upper abdomen Midline left and right upper quadrants  scar present with prominent healing ridge. No sign of recurrence, hematoma or seroma. Tenderness to palpation incisional only.    NEUROLOGIC: No sensory or motor nerve irritation  MALE : bilateral testes normal, no scrotal swelling or edema               Electronically signed by Ariel Morales MD, FACS on 11/30/2020 at 9:19 AM

## 2020-11-30 ENCOUNTER — OFFICE VISIT (OUTPATIENT)
Dept: SURGERY | Age: 35
End: 2020-11-30

## 2020-11-30 VITALS
DIASTOLIC BLOOD PRESSURE: 60 MMHG | BODY MASS INDEX: 31.55 KG/M2 | SYSTOLIC BLOOD PRESSURE: 118 MMHG | WEIGHT: 213 LBS | RESPIRATION RATE: 18 BRPM | HEIGHT: 69 IN | OXYGEN SATURATION: 97 % | HEART RATE: 89 BPM | TEMPERATURE: 97.2 F

## 2020-11-30 PROCEDURE — 99024 POSTOP FOLLOW-UP VISIT: CPT | Performed by: SURGERY

## 2020-11-30 NOTE — LETTER
[facility]  Sumeet Rubio MD FACS  Bnmtw- [phone]  Fax [phone]    Pt Name: Alicja Ramirez  Medical Record Number: [id number]  Date of Birth 1985   Today's Date: 11/30/2020    Gerardo Guan was evaluated in the office today. My assessment and plans are listed below. Assessment:     Sergey Balderrama was seen today for post-op check. Diagnoses and all orders for this visit:    S/P robotic assisted LEFT inguinal hernia repair         Plan:   Pathology reviewed with the patient who understands. All questions were answered. New Prescriptions    No medications on file     Patient Instructions   May return to full activity without restrictions. Follow up: Return if symptoms worsen or fail to improve. If I can provide any additional assistance or you have any concerns, please feel free to contact me. Thank you for allowing to participate in the care of your patients. Sincerely,      Sumeet Rubio MD FACS  [address]  [facility], [address]  Office: [phone]  Fax: [phone]

## 2020-11-30 NOTE — LETTER
[facility]  [place] [address] [address] [facility] [address]  Phone: [phone]  Fax: [phone]    Adrienne Jay MD        November 30, 2020     Patient: Marcus Aguilera   YOB: 1985   Date of Visit: 11/30/2020       To Whom It May Concern: It is my medical opinion that Libby Cutler may return to full duty immediately with no restrictions. If you have any questions or concerns, please don't hesitate to call.     Sincerely,          Adrienne Jay MD

## 2020-12-17 PROBLEM — Z87.19 S/P LAPAROSCOPIC HERNIA REPAIR: Chronic | Status: ACTIVE | Noted: 2020-11-13

## 2020-12-17 PROBLEM — Z98.890 S/P LAPAROSCOPIC HERNIA REPAIR: Chronic | Status: ACTIVE | Noted: 2020-11-13

## 2020-12-17 PROBLEM — R10.32 LEFT GROIN PAIN: Status: RESOLVED | Noted: 2020-11-02 | Resolved: 2020-12-17

## 2020-12-17 PROBLEM — K40.90 LEFT INGUINAL HERNIA: Status: RESOLVED | Noted: 2020-11-10 | Resolved: 2020-12-17

## 2020-12-18 ENCOUNTER — OFFICE VISIT (OUTPATIENT)
Dept: FAMILY MEDICINE CLINIC | Age: 35
End: 2020-12-18
Payer: COMMERCIAL

## 2020-12-18 VITALS
TEMPERATURE: 97.4 F | DIASTOLIC BLOOD PRESSURE: 86 MMHG | RESPIRATION RATE: 16 BRPM | OXYGEN SATURATION: 96 % | BODY MASS INDEX: 31.64 KG/M2 | SYSTOLIC BLOOD PRESSURE: 134 MMHG | HEIGHT: 69 IN | WEIGHT: 213.6 LBS | HEART RATE: 61 BPM

## 2020-12-18 PROCEDURE — 99214 OFFICE O/P EST MOD 30 MIN: CPT | Performed by: FAMILY MEDICINE

## 2020-12-18 PROCEDURE — 4004F PT TOBACCO SCREEN RCVD TLK: CPT | Performed by: FAMILY MEDICINE

## 2020-12-18 PROCEDURE — G8427 DOCREV CUR MEDS BY ELIG CLIN: HCPCS | Performed by: FAMILY MEDICINE

## 2020-12-18 PROCEDURE — G8484 FLU IMMUNIZE NO ADMIN: HCPCS | Performed by: FAMILY MEDICINE

## 2020-12-18 PROCEDURE — G8417 CALC BMI ABV UP PARAM F/U: HCPCS | Performed by: FAMILY MEDICINE

## 2020-12-18 RX ORDER — ESCITALOPRAM OXALATE 10 MG/1
10 TABLET ORAL DAILY
Qty: 30 TABLET | Refills: 3 | Status: SHIPPED | OUTPATIENT
Start: 2020-12-18 | End: 2021-06-02 | Stop reason: SDUPTHER

## 2020-12-18 RX ORDER — MELOXICAM 15 MG/1
15 TABLET ORAL DAILY PRN
Qty: 30 TABLET | Refills: 3 | Status: SHIPPED | OUTPATIENT
Start: 2020-12-18

## 2020-12-18 ASSESSMENT — PATIENT HEALTH QUESTIONNAIRE - PHQ9
5. POOR APPETITE OR OVEREATING: 3
2. FEELING DOWN, DEPRESSED OR HOPELESS: 2
3. TROUBLE FALLING OR STAYING ASLEEP: 3
SUM OF ALL RESPONSES TO PHQ QUESTIONS 1-9: 22
SUM OF ALL RESPONSES TO PHQ9 QUESTIONS 1 & 2: 5
SUM OF ALL RESPONSES TO PHQ QUESTIONS 1-9: 25
9. THOUGHTS THAT YOU WOULD BE BETTER OFF DEAD, OR OF HURTING YOURSELF: 3
7. TROUBLE CONCENTRATING ON THINGS, SUCH AS READING THE NEWSPAPER OR WATCHING TELEVISION: 3
10. IF YOU CHECKED OFF ANY PROBLEMS, HOW DIFFICULT HAVE THESE PROBLEMS MADE IT FOR YOU TO DO YOUR WORK, TAKE CARE OF THINGS AT HOME, OR GET ALONG WITH OTHER PEOPLE: 3
SUM OF ALL RESPONSES TO PHQ QUESTIONS 1-9: 25
6. FEELING BAD ABOUT YOURSELF - OR THAT YOU ARE A FAILURE OR HAVE LET YOURSELF OR YOUR FAMILY DOWN: 3
4. FEELING TIRED OR HAVING LITTLE ENERGY: 3
8. MOVING OR SPEAKING SO SLOWLY THAT OTHER PEOPLE COULD HAVE NOTICED. OR THE OPPOSITE, BEING SO FIGETY OR RESTLESS THAT YOU HAVE BEEN MOVING AROUND A LOT MORE THAN USUAL: 2
1. LITTLE INTEREST OR PLEASURE IN DOING THINGS: 3

## 2020-12-18 ASSESSMENT — COLUMBIA-SUICIDE SEVERITY RATING SCALE - C-SSRS
2. HAVE YOU ACTUALLY HAD ANY THOUGHTS OF KILLING YOURSELF?: YES
3. HAVE YOU BEEN THINKING ABOUT HOW YOU MIGHT KILL YOURSELF?: NO
1. WITHIN THE PAST MONTH, HAVE YOU WISHED YOU WERE DEAD OR WISHED YOU COULD GO TO SLEEP AND NOT WAKE UP?: YES
5. HAVE YOU STARTED TO WORK OUT OR WORKED OUT THE DETAILS OF HOW TO KILL YOURSELF? DO YOU INTEND TO CARRY OUT THIS PLAN?: NO
6. HAVE YOU EVER DONE ANYTHING, STARTED TO DO ANYTHING, OR PREPARED TO DO ANYTHING TO END YOUR LIFE?: NO
4. HAVE YOU HAD THESE THOUGHTS AND HAD SOME INTENTION OF ACTING ON THEM?: NO

## 2020-12-18 NOTE — PATIENT INSTRUCTIONS
Patient Education        Depression Treatment: Care Instructions  Your Care Instructions     Depression is a condition that affects the way you feel, think, and act. It causes symptoms such as low energy, loss of interest in daily activities, and sadness or grouchiness that goes on for a long time. Depression is very common and affects men and women of all ages. Depression is a medical illness caused by changes in the natural chemicals in your brain. It is not a character flaw, and it does not mean that you are a bad or weak person. It does not mean that you are going crazy. It is important to know that depression can be treated. Medicines, counseling, and self-care can all help. Many people do not get help because they are embarrassed or think that they will get over the depression on their own. But some people do not get better without treatment. Follow-up care is a key part of your treatment and safety. Be sure to make and go to all appointments, and call your doctor if you are having problems. It's also a good idea to know your test results and keep a list of the medicines you take. How can you care for yourself at home? Learn about antidepressant medicines  Antidepressant medicines can improve or end the symptoms of depression. You may need to take the medicine for at least 6 months, and often longer. Keep taking your medicine even if you feel better. If you stop taking it too soon, your symptoms may come back or get worse. You may start to feel better within 1 to 3 weeks of taking antidepressant medicine. But it can take as many as 6 to 8 weeks to see more improvement. Talk to your doctor if you have problems with your medicine or if you do not notice any improvement after 3 weeks. Antidepressants can make you feel tired, dizzy, or nervous. Some people have dry mouth, constipation, headaches, sexual problems, an upset stomach, or diarrhea.  Many of these side effects are mild and go away on their own after you take the medicine for a few weeks. Some may last longer. Talk to your doctor if side effects bother you too much. You might be able to try a different medicine. If you are pregnant or breastfeeding, talk to your doctor about what medicines you can take. Learn about counseling  In many cases, counseling can work as well as medicines to treat mild to moderate depression. Counseling is done by licensed mental health providers, such as psychologists, social workers, and some types of nurses. It can be done in one-on-one sessions or in a group setting. Many people find group sessions helpful. Cognitive-behavioral therapy is a type of counseling. In this treatment therapy, you learn how to see and change unhelpful thinking styles that may be adding to your depression. Counseling and medicines often work well when used together. Here are other things you could try to help with depression:  · Get regular exercise. It may help you feel better. · Plan something pleasant for yourself every day. Include activities that you have enjoyed in the past.  · Get enough sleep. Talk to your doctor if you have problems sleeping. · Eat a balanced diet. If you do not feel hungry, eat small snacks rather than large meals. · Avoid using illegal drugs or marijuana and drinking alcohol. Do not take medicines that have not been prescribed for you. They may interfere with your treatment, or they may make your depression worse. · Spend time with family and friends. It may help to speak openly about your depression with people you trust.  · Take your medicines exactly as prescribed. Call your doctor if you think you are having a problem with your medicine. · Do not make major life decisions while you are depressed. Depression may change the way you think. You will be able to make better decisions after you feel better. · Think positively. Challenge negative thoughts with statements such as \"I am hopeful\";  \"Things will get better\"; and \"I can ask for the help I need. \" Write down these statements and read them often, even if you don't believe them yet. · Be patient with yourself. It took time for your depression to develop, and it will take time for your symptoms to improve. Do not take on too much or be too hard on yourself. · Learn all you can about depression from written and online materials. · Check out behavioral health classes to learn more about dealing with depression. · If you or someone you know talks about suicide, self-harm, or feeling hopeless, get help right away. Call the [address] [place] [address] at 0-918-130-HMWY (2-898.176.8263) or text HOME to 902721 to access the [employer] Text Line. Consider saving these numbers in your phone. When should you call for help? Call 911 anytime you think you may need emergency care. For example, call if:    · You feel you cannot stop from hurting yourself or someone else. Call your doctor now or seek immediate medical care if:    · You hear voices.     · You feel much more depressed. Watch closely for changes in your health, and be sure to contact your doctor if:    · You are having problems with your depression medicine.     · You are not getting better as expected. Where can you learn more? Go to https://[employer]. org and sign in to your [employer] account. Enter K435 in the [employer] [facility] box to learn more about \"Depression Treatment: Care Instructions. \"     If you do not have an account, please click on the \"Sign Up Now\" link. Current as of: January 31, 2020               Content Version: 12.6  © 1650-4422 [employer]. Care instructions adapted under license by [facility] ([facility]). If you have questions about a medical condition or this instruction, always ask your healthcare professional. [address] any warranty or liability for your use of this information.          Patient Education        Stopping Smokeless Tobacco Use: Care Instructions  Your Care Instructions     Smokeless tobacco comes in many forms, such as snuff and chewing tobacco:  · Snuff is finely ground tobacco sold in cans or pouches. Most of the time, snuff is used by putting a \"pinch\" or \"dip\" between the lower lip or cheek and the gum. · Chewing tobacco is sold as loose leaves, plugs, or twists. It is chewed or placed between the cheek and the gum or teeth. There are plenty of reasons to stop using smokeless tobacco. These products are harmful. They are not risk-free alternatives to smoking. Smokeless tobacco contains nicotine, which is addicting. Though using smokeless tobacco is less harmful than smoking cigarettes, it can cause serious health problems, such as:  · White patches or red sores in your mouth that can turn into mouth cancer involving the lip, tongue, or cheek. · Tooth loss and other dental problems. · Gum disease. Your gums may pull away from your teeth and not grow back. People who use smokeless tobacco crave the nicotine in it. Giving up smokeless tobacco is much harder than simply changing a habit. Your body has to stop craving the nicotine. It is hard to quit, but you can do it. Many tools are available for people who want to quit using smokeless tobacco. You may find that combining tools works best for you. There are several steps to quitting. First you get ready to quit. Then you get support to help you. After that, you learn new skills and behaviors to quit. For many people, a necessary step is getting and using medicine. Your doctor will help you set up the plan that best meets your needs. You may want to attend a tobacco cessation program. When you choose a program, look for one that has proven success. Ask your doctor for ideas.  You will greatly increase your chances of success if you take medicine as well as get counseling or join a cessation program.  Some of the changes you feel when you first quit smokeless tobacco are uncomfortable. Your body will miss the nicotine at first, and you may feel short-tempered and grumpy. You may have trouble sleeping or concentrating. Medicine can help you deal with these symptoms. You may struggle with changing your habits and rituals. The last step is the tricky one: Be prepared for the urge to use smokeless tobacco to continue for a time. This is a lot to deal with, but keep at it. You will feel better. Follow-up care is a key part of your treatment and safety. Be sure to make and go to all appointments, and call your doctor if you are having problems. It's also a good idea to know your test results and keep a list of the medicines you take. How can you care for yourself at home? · Ask your family, friends, and coworkers for support. You have a better chance of quitting if you have help and support. · Join a support group for people who are trying to quit using smokeless tobacco.  · Set a quit date. Pick your date carefully so that it is not right in the middle of a big deadline or stressful time. After you quit, do not use smokeless tobacco even once. Get rid of all spit cups, cans, and pouches after your last use. Clean your house and your clothes so that they do not smell of tobacco.  · Learn how to be a non-user. Think about ways you can avoid those things that make you reach for tobacco.  ? Learn some ways to deal with cravings, like calling a friend or going for a walk. Cravings often pass. ? Avoid situations that put you at greatest risk for using smokeless tobacco. For some people, it is hard to spend time with friends without dipping or chewing. For others, they might skip a coffee break with coworkers who smoke or use smokeless tobacco.  ? Change your daily routine. Take a different route to work, or eat a meal in a different place. · Cut down on stress.  Calm yourself or release tension by doing an activity you enjoy, such as reading a book, taking a hot bath, or

## 2020-12-18 NOTE — PROGRESS NOTES
Chief Complaint   Patient presents with   [address] Maintenance     no vaccines today       History obtained from the patient. SUBJECTIVE:  Jennifer Wells is a 38 y.o. male that presents today for       -Depressed Mood:    HPI:  Been feeling depressed for the last year  Never dx before or treated  Has occ vague thoughts of not being around  No active SI  No HI    Depressed Mood? yes -   Anhedonia? yes -   Appetite changes? yes -   Sleep disturbances? yes -   Feelings of guilt? yes -   Decreased energy? yes -   Impaired concentration? yes -   Substance abuse? no        -Tobacco:  Is still chewing tobacco, not interested in quitting  Has started smoking again as well since, 1/2 PPD. Not interested in quitting that either.      Age/Gender Health Maintenance    Lipid -   No components found for: CHLPL  Lab Results   Component Value Date    TRIG 77 08/05/2020    TRIG 113 07/30/2019    TRIG 113 06/24/2017     Lab Results   Component Value Date    HDL 31 08/05/2020    HDL 39 07/30/2019    HDL 35 06/24/2017     Lab Results   Component Value Date    LDLCALC 112 08/05/2020    [address] 91 07/30/2019    [address] 132 06/24/2017     No results found for: LABVLDL      DM Screen -   Lab Results   Component Value Date    GLUCOSE 106 11/09/2020     Lab Results   Component Value Date    LABA1C 5.2 08/06/2019       Colon Cancer Screening - Age 48    Tetanus - Declines July 2017/July 2019  Influenza Vaccine - Declines FALL 2020  Pneumonia Vaccine - Age 72  Zoster - age 48     PSA testing discussion - Age 53  AAA Screening - Age 71; smoked      Current Outpatient Medications   Medication Sig Dispense Refill    escitalopram (LEXAPRO) 10 MG tablet Take 1 tablet by mouth daily 30 tablet 3    Methotrexate, PF, 12.5 MG/0.4ML SOAJ Inject 15 mg into the skin once a week       Folic Acid 5 MG CAPS Take 5 mg by mouth daily      lisinopril-hydroCHLOROthiazide (PRINZIDE) 20-12.5 MG per tablet Take 1 tablet by mouth daily 90 SpO2: 96%   Weight: 213 lb 9.6 oz (96.9 kg)   Height: 5' 9\" (1.753 m)     Body mass index is 31.54 kg/m². Pain Score:   0 - No pain    VS Reviewed  General Appearance: A&O x 3, No acute distress,well developed and well- nourished  Eyes: pupils equal, round, and reactive to light, extraocular eye movements intact, conjunctivae and eye lids without erythema  ENT: external ear and ear canal clear bilaterally, TMs intact and regular, nose without deformity, nasal mucosa and turbinates normal without polyps, oropharynx normal, dentition is normal for age  Neck: supple and non-tender without mass, no thyromegaly or thyroid nodules, no cervical lymphadenopathy  Pulmonary/Chest: clear to auscultation bilaterally- no wheezes, rales or rhonchi, normal air movement, no respiratory distress or retractions  Cardiovascular: S1 and S2 auscultated w/ RRR. No murmurs, rubs, clicks, or gallops, distal pulses intact. Abdomen: soft, non-tender, non-distended, bowel sounds physiologic,  no rebound or guarding, no masses or hernias noted. Liver and spleen without enlargement. Extremities: no cyanosis, clubbing or edema of the lower extremities. Skin: warm and dry, no rash or erythema  Psych: Affect constricted. Mood depressed. Thought process is normal without evidence of depression or psychosis. Good insight and appropriate interaction. Cognition and memory appear to be intact. ASSESSMENT & PLAN  1. Current severe episode of major depressive disorder without psychotic features without prior episode ([facility] [address]    No active SI/HI  Start lexapro  Refer for therapy  F/u 6 wks    Discussed side effects and benefits of lexapro. I advised him that if he develops any SI/HI or concerning symptoms after starting the medication he needs to stop the medication and seek treatment immediately    - escitalopram (LEXAPRO) 10 MG tablet; Take 1 tablet by mouth daily  Dispense: 30 tablet;  Refill: 3  - [address], Karen Ortega, [address]    2. Chewing tobacco nicotine dependence without complication    In precontemplation stage and not ready to quit. Declines cessation, aware of risks of continued smoking as well as resources available to help quit. These include tobacco cessation classes as well as 1-800-QUIT NOW. No barriers other than lack of desire. 3+ min spent counseling. 3. Cigarette nicotine dependence without complication    As per # 2      DISPOSITION    Return in about 6 weeks (around 1/29/2021) for follow-up depression, sooner as needed. Tracy Ramírez released without restrictions. Future Appointments   Date Time Provider Tylor Reyes   8/13/2021  8:20 AM [facility] received counseling on the following healthy behaviors: nutrition, exercise, medication adherence and tobacco cessation    Patient given educational materials on: See Attached    I have instructed Tracy Ramírez to complete a self tracking handout on Smoking and instructed them to bring it with them to his next appointment. Barriers to learning and self management: none    Discussed use, benefit, and side effects of prescribed medications. Barriers to medication compliance addressed. All patient questions answered. Pt voiced understanding.        Electronically signed by Atif Cruz DO on 12/18/2020 at 8:57 AM

## 2020-12-28 ENCOUNTER — OFFICE VISIT (OUTPATIENT)
Dept: BEHAVIORAL/MENTAL HEALTH CLINIC | Age: 35
End: 2020-12-28
Payer: COMMERCIAL

## 2020-12-28 PROCEDURE — 90791 PSYCH DIAGNOSTIC EVALUATION: CPT | Performed by: SOCIAL WORKER

## 2020-12-28 ASSESSMENT — ANXIETY QUESTIONNAIRES
4. TROUBLE RELAXING: 3-NEARLY EVERY DAY
6. BECOMING EASILY ANNOYED OR IRRITABLE: 3-NEARLY EVERY DAY
3. WORRYING TOO MUCH ABOUT DIFFERENT THINGS: 3-NEARLY EVERY DAY
2. NOT BEING ABLE TO STOP OR CONTROL WORRYING: 3-NEARLY EVERY DAY
5. BEING SO RESTLESS THAT IT IS HARD TO SIT STILL: 3-NEARLY EVERY DAY
GAD7 TOTAL SCORE: 20
1. FEELING NERVOUS, ANXIOUS, OR ON EDGE: 2-OVER HALF THE DAYS
7. FEELING AFRAID AS IF SOMETHING AWFUL MIGHT HAPPEN: 3-NEARLY EVERY DAY

## 2020-12-28 ASSESSMENT — PATIENT HEALTH QUESTIONNAIRE - PHQ9
9. THOUGHTS THAT YOU WOULD BE BETTER OFF DEAD, OR OF HURTING YOURSELF: 0
2. FEELING DOWN, DEPRESSED OR HOPELESS: 1
5. POOR APPETITE OR OVEREATING: 3
SUM OF ALL RESPONSES TO PHQ QUESTIONS 1-9: 19
8. MOVING OR SPEAKING SO SLOWLY THAT OTHER PEOPLE COULD HAVE NOTICED. OR THE OPPOSITE, BEING SO FIGETY OR RESTLESS THAT YOU HAVE BEEN MOVING AROUND A LOT MORE THAN USUAL: 3
4. FEELING TIRED OR HAVING LITTLE ENERGY: 3
SUM OF ALL RESPONSES TO PHQ9 QUESTIONS 1 & 2: 3
SUM OF ALL RESPONSES TO PHQ QUESTIONS 1-9: 19
3. TROUBLE FALLING OR STAYING ASLEEP: 3
SUM OF ALL RESPONSES TO PHQ QUESTIONS 1-9: 19
6. FEELING BAD ABOUT YOURSELF - OR THAT YOU ARE A FAILURE OR HAVE LET YOURSELF OR YOUR FAMILY DOWN: 3
7. TROUBLE CONCENTRATING ON THINGS, SUCH AS READING THE NEWSPAPER OR WATCHING TELEVISION: 1
10. IF YOU CHECKED OFF ANY PROBLEMS, HOW DIFFICULT HAVE THESE PROBLEMS MADE IT FOR YOU TO DO YOUR WORK, TAKE CARE OF THINGS AT HOME, OR GET ALONG WITH OTHER PEOPLE: 1
1. LITTLE INTEREST OR PLEASURE IN DOING THINGS: 2

## 2020-12-28 NOTE — PATIENT INSTRUCTIONS
1. Depression: Facts, Myths & Tips for Feeling Better    Facts    Depression is very common  Nearly 20% of the [place] population experiences a significant depression during their lifetime. Depression is treatable  Because depression affects so many, and can have such a powerful and negative impact on life, there has been an enormous amount of research conducted on how to reduce symptoms and improve functioning. As a result, we now know there are behaviors YOU can engage in to make yourself feel better. Depression is not a weakness  People suffer from depression for a variety of reasons, biological, environmental and behavioral.  Research indicates that [employer] is NOT one of the reasons people become depressed. Depression is not something you are powerless against  Evidence suggests that you can directly impact the intensity and duration of depression by what you do and by altering the way you think about certain things. The Downward Spiral    Depression often begins as a drop in mood due to an environmental or biological trigger that makes people feel less like being active. Being less active, in turn, often causes people to experience an even lower mood and feel even less like being active, and so the cycle begins. How can I start feeling better? The first and best way to reverse the downward cycle is to get active! Your body produces its own anti-depressants every time you exercise or do something pleasurable. Regular exercise is one of the very best ways to improve your mood. In fact some studies have shown that a solid exercise program is as effective as psychotherapy or anti-depressant medication for some people. *See physical activity section of handout    Force yourself to do something you found pleasurable before depression. This may be different for everyone and it doesnt matter if its gardening, playing bridge, walking, reading a novel, or simply talking to a close friend. What matters is that YOU find the activity pleasurable! Even if you dont feel like doing something pleasurable for yourself, DO IT ANYWAY. We call this the fake it until you make it principle. Educate yourself! Often people feel powerless against medical conditions because they do not understand what is happening in their body. Just by reading this handout you know more than most people about depression. Knowledge is power when you can apply it, and make yourself feel better. *See recommended reading list at the bottom of this handout\"    Begin to notice unhealthy and unhelpful thoughts! In addition to how we behave, how we think influences our mood directly. Notice recurrent or alarming thoughts that have an impact on your mood. Ask yourself is this type of thinking helping me or hurting me?  if your answer is it's hurting me here are some things you can do: *see disputation of negative thoughts section of handout  - Challenge the negative thought. Is it truly accurate? Wheres the proof? Become your own  and collect the facts.  - Reframe the negative thought. How can I think differently about this problem, situation, or view of myself? Allow yourself to view a situation from more than one angle, how might my spouse, friend, or someone I admire view this same problem?  - Use the best friend scenario. How would you help your best friend if he or she was having these same thoughts? Would you criticize him or her as harshly as you criticize yourself? Remember, YOU know YOU better than anyone else. You likely know what kinds of activities, thoughts and reinforcement you respond to. Doing whats easiest and most doable is the key. Pick 1 or 2 things that are easy and get started feeling better TODAY!     * Use the following handout sections to guide you through behavioral and thinking exercises to help you manage your depressive symptoms, improve your functioning and to begin living your life well again. Recommended readings on managing depression    - Feeling Good by Dr. Rosemarie Qureshi. Tay     - Mind over [place] by Lawrence Camacho and [facility] by Dr. Louise Reach by Dr. Enrique Paez. [place]      Disputation:  Challenging Upsetting Thinking    Examine your thoughts for key words:  1. must, need, got to, have to, should (unrealistic standards)  2. never, always, completely, totally, all everything, everyone (predictions /  labeling)  3. awful, terrible, horrible, unbearable, disaster, worst ever (labeling / predictions)  4. jerk, slob, creep, hypocrite, bully, stupid (labels)  Dispute or question the accuracy of the questionable thoughts. 1. Am I upsetting myself unnecessarily? How can I see this another way? 2. Is my thinking working for or against me? How could I view this in a less upsetting way? 3. What am I demanding must happen? What do I want or prefer, rather than need? 4. Am I making something too terrible? Is it really that awful? What would be so terrible about that? 5. Am I labeling a person? What is the action that I dont like? 6. Whats untrue about my thoughts? How can I stick to the facts? Whats the proof for what I am thinking or believing about this? 7. Am I using extreme, black-and-white language? What less extreme words might be more accurate? 8. Am I fortune telling or mind reading in a way that gets me upset or unhappy? What are the odds (percent chance -- e.g., there is a 5% chance. ..) that it will really turn out the way Im thinking or imagining? 9. What are my options in this situation? How would I like to respond? 10. Create more moderate, helpful, or realistic statements to replace the upsetting ones. 11. Have I had any experiences that show that this thought might not be totally true? 12. If my best friend or someone I loved had this thought, what would I tell them?   13. If my best friend or someone I loved knew I was thinking this thought, what would they say to me? What evidence would they point out to me that would suggest that my thought is not completely true? 14. Are there strengths in me or positives in the situation that I am ignoring? Am I underestimating my ability to cope with unfortunate circumstances? 15. When I am not feeling this way, do I think about this situation any differently? How?  16. Have I been in this type of situation before? What happened? What have I learned from prior experiences that could help me now? 17. Five years from now, if I look back on this situation, will I look at it any differently? Will I focus on any different part of my experience? 25. Am I blaming myself for something over which I do not have complete control? 2.Pt was advised of indications of depressive symptoms and instructed to monitor if symptoms worsen or interfere with daily functioning, to consider following-up with either your primary care team or a behavioral health provider for possible counseling or medication management. If suicidal thoughts are experienced, call 911. An additional 24/7 resource is the [facility] at 9-398-943-XUWM ([phone]). 3.   Deep Breathing Exercises      Exercise 1:  The Stimulating Breath (also called the Akbar Breath)   Its aim is to raise energy level and increase alertness. - Inhale and exhale rapidly through your nose, keeping your mouth closed but relaxed. Your breaths in and out should be equal in duration, but as short as possible. This is a noisy breathing exercise. - Try for three in-and-out breath cycles per second. This produces a quick movement of the diaphragm, suggesting a akbar. Breathe normally after each cycle. - Do not do for more than 15 seconds on your first try. Each time you practice the Stimulating Breath, you can increase your time by five seconds or so, until you reach a full minute.   If done properly, you may feel invigorated, comparable to the heightened awareness you feel after a good workout. You should feel the effort at the back of the neck, the diaphragm, the chest and the abdomen. Try this breathing exercise the next time you need an energy boost and feel yourself reaching for a cup of coffee. Exercise 2:  The 4-7-8 (or Relaxing Breath) Exercise  This exercise is utterly simple, takes almost no time, requires no equipment and can be done anywhere. Although you can do the exercise in any position, sit with your back straight while learning the exercise. Place the tip of your tongue against the ridge of tissue just behind your upper front teeth, and keep it there through the entire exercise. You will be exhaling through your mouth around your tongue; try pursing your lips slightly if this seems awkward.  Exhale completely through your mouth, making a whoosh sound.  Close your mouth and inhale quietly through your nose to a mental count of four.  Hold your breath for a count of seven.  Exhale completely through your mouth, making a whoosh sound to a count of eight.  This is one breath. Now inhale again and repeat the cycle three more times for a total of four breaths. Note that you always inhale quietly through your nose and exhale audibly through your mouth. The tip of your tongue stays in position the whole time. Exhalation takes twice as long as inhalation. The absolute time you spend on each phase is not important; the ratio of 4:7:8 is important. If you have trouble holding your breath, speed the exercise up but keep to the ratio of 4:7:8 for the three phases. With practice you can slow it all down and get used to inhaling and exhaling more and more deeply. This exercise is a natural tranquilizer for the nervous system.  Unlike tranquilizing drugs, which are often effective when you first take them but then lose their power over time, this exercise is subtle when you first try it but gains in power with repetition and practice. Do it at least twice a day. You cannot do it too frequently. Do NOT do more than 4 breaths at one time for the first month of practice. Later, if you wish, you can extend it to eight breaths. If you feel a little lightheaded when you first breathe this way, do not be concerned; it will pass. Once you develop this technique by practicing it every day, it will be a very useful tool that you will always have with you. Use it whenever anything upsetting happens - before you react. Use it whenever you are aware of internal tension. Use it to help you fall asleep. This exercise cannot be recommended too highly. Everyone can benefit from it. Exercise 3: Meditation exercise  Breath Counting  If you want to get a feel for this challenging work, try your hand at breath counting, a deceptively simple technique. Sit in a comfortable position with the spine straight and head inclined slightly forward. Gently close your eyes and take a few deep breaths. Then let the breath come naturally without trying to influence it. Ideally it will be quiet and slow, but depth and rhythm may vary.  To begin the exercise, count \"one\" to yourself as you exhale.  The next time you exhale, count \"two,\" and so on up to Eric. \"   Then begin a new cycle, counting \"one\" on the next exhalation. Never count higher than \"five,\" and count only when you exhale. You will know your attention has wandered when you find yourself up to \"eight,\" \"12,\" even \"19. \"  Try to do 10 minutes of this form of meditation. 3. Pt will prioritize effective self care- sleep, increased physical outlet, relaxation  4. Pt will familiarize self with [facility].   5. Pt will follow up with MAXI Medina

## 2020-12-28 NOTE — PROGRESS NOTES
Behavioral Health Consultation  RUSS Dsouza-S  12/28/2020  10:07 AM EST    This note will not be viewable in [employer] for the following reason(s). This is a Psychotherapy Note. Time spent with Patient: 25 minutes  This is patient's first  [facility] appointment. Reason for Consult:    Chief Complaint   Patient presents with    Depression     Referring Provider: Charolette Rich [address] [place], [address]    Pt provided informed consent for the behavioral health program. Discussed with patient model of service to include the limits of confidentiality (i.e. abuse reporting, suicide intervention, etc.) and short-term intervention focused approach. Pt indicated understanding. Feedback given to PCP. S:  Pt identified the presenting problem as symptoms of depression and indicated this as his primary need  to address through behavioral health services. The history of the problem was explored with pt in establishing having experienced bouts of high anxiety and depression for several years. Pt denied any identifiable triggers or elements of high stress precipitating this onset of symptoms. The current situation was processed with pt in examining  thoughts, feelings, and impairment on daily functioning. Pt indicated symptoms of poor sleep (3 hours nightly), negative thoughts about self, tendencies of self isolating, lack of motivation/drive, feeling anxious/edgy daily, irritability, and decreased appetite. Pt was guided in exploring areas of behavioral change, development of effective coping strategies, and assessing the management of environmental factors influencing the problem. The PHQ-9 was administered and pt scored 19, indicating  Moderate major depression. The SABA 7 scored at 20 indicating symptoms of anxiety to be in the severe range.  Pt denied  thoughts of suicide in the last two weeks, but acknowledged  fleeting thoughts that he would be better off dead occurred almost daily leading up to his appointment with his PCP and medication being prescribed this month. Crisis services were preventively discussed. Pt was advised of indications of depressive symptoms and instructed to monitor if symptoms worsen or interfere with daily functioning, to consider following-up with either your primary care team or a behavioral health provider for possible counseling or medication management. If suicidal thoughts are experienced, call 911. An additional 24/7 resource is the [facility] at [phone]-BXQQ ([phone]). Pt will attend a follow up appointment next week. Tasneemcarlene Myers:  MSE:    Appearance    cooperative  Appetite abnormal: poor/overeating  Sleep disturbance Yes  Fatigue Yes  Loss of pleasure Yes  Impulsive behavior No  Speech    normal rate  Mood    euthymic   Affect    normal affect  Thought Content    intact  Thought Process    coherent  Associations    logical connections  Insight    Fair  Judgment    Intact  Orientation    oriented to person, place, time, and general circumstances  Memory    recent and remote memory intact  Attention/Concentration    intact  Morbid ideation No  Suicide Assessment    no suicidal ideation; hx of fleeting thoughts prior to starting medication; preventively discussed crisis supports. History:    TOBACCO:   reports that he has been smoking cigarettes. He has a 12.00 pack-year smoking history. He has quit using smokeless tobacco.  His smokeless tobacco use included chew. ETOH:   reports no history of alcohol use. Family History:   Family History   Problem Relation Age of Onset    Heart Disease Mother 52        MI    Early Death Father         MVA    Prostate Cancer Paternal Grandfather         unsure age   Manda Rodriguez High Blood Pressure Other         Pt unsure if anyone in family does.  Colon Cancer Neg Hx        A:       Diagnosis:    1. Moderate episode of recurrent major depressive disorder ([facility] [address]    2.  Generalized anxiety disorder          Diagnosis Date    Dependence on nicotine from chewing tobacco 6/23/2015    Eczema 6/23/2015    GERD (gastroesophageal reflux disease)     History of asthma     Hypertension 9/30/2014    Irritable bowel syndrome with diarrhea     Major depression     Migraine headache     NAFLD (nonalcoholic fatty liver disease)     Obesity (BMI 30-39. 9)     Rheumatoid arthritis ([address]     sees Dr Tina Valdes S/P robotic assisted LEFT inguinal hernia repair 11/13/2020    Smoker        Plan: Pt will attend a follow up appointment next week to assess symptoms and evaluate effectiveness of coping skills. Pt interventions:  Provided handout on  depression, suicidal thoughts/threats and anxiety, Trained in relaxation strategies and Discussed self-care (sleep, nutrition, rewarding activities, social support, exercise)    Pt Behavioral Change Plan:   1. Pt will read handouts about depression, anxiety, and self care. 2. Pt will practice daily deep breathing skills for 3-5 minutes, 2-3x's daily. 3. . Pt will prioritize effective self care- sleep, increased physical outlet, relaxation  4. Pt will familiarize self with [facility]. 5. Pt will follow up with MAXI Lebron  6Pt was advised of indications of depressive symptoms and instructed to monitor if symptoms worsen or interfere with daily functioning, to consider following-up with either your primary care team or a behavioral health provider for possible counseling or medication management. If suicidal thoughts are experienced, call 911. An additional 24/7 resource is the [facility] at 4-212-222-UAHU ([phone]).

## 2021-01-05 ENCOUNTER — OFFICE VISIT (OUTPATIENT)
Dept: BEHAVIORAL/MENTAL HEALTH CLINIC | Age: 36
End: 2021-01-05
Payer: COMMERCIAL

## 2021-01-05 DIAGNOSIS — F41.1 GENERALIZED ANXIETY DISORDER: ICD-10-CM

## 2021-01-05 DIAGNOSIS — F33.1 MODERATE EPISODE OF RECURRENT MAJOR DEPRESSIVE DISORDER (HCC): Primary | ICD-10-CM

## 2021-01-05 PROCEDURE — 90832 PSYTX W PT 30 MINUTES: CPT | Performed by: SOCIAL WORKER

## 2021-01-05 PROCEDURE — 4004F PT TOBACCO SCREEN RCVD TLK: CPT | Performed by: SOCIAL WORKER

## 2021-01-05 NOTE — PROGRESS NOTES
Behavioral Health Consultation  MAXI Harper  1/5/2021  9:05 AM EST    This note will not be viewable in [employer] for the following reason(s). This is a Psychotherapy Note. Time spent with Patient:30 minutes  This is patient's second [facility] appointment. Reason for Consult:    Chief Complaint   Patient presents with    Depression    Anxiety     Referring Provider: Stanford Mustafa DO  Pt provided informed consent for the behavioral health program. Discussed with patient model of service to include the limits of confidentiality (i.e. abuse reporting, suicide intervention, etc.) and short-term intervention focused approach. Pt indicated understanding. Feedback given to PCP. S:  Pt assessed that symptoms of depression and anxiety have steadily improved. He indicated things evident of this as a noticeable decrease in symptom frequency and intensity. Pt determined paired with his medication he has been using coping skills of deep breathing, distraction, and support of others to manage symptoms. He recognized that triggers of large groups of people have been more manageable. Pt denied any current or recent thoughts of suicide. He examined things he will continue to do in support of maintaining his stability and to make further progress. Pt was advised of indications of depressive symptoms and instructed to monitor if symptoms worsen or interfere with daily functioning, to consider following-up with either your primary care team or a behavioral health provider for possible counseling or medication management. If suicidal thoughts are experienced, call 911. An additional 24/7 resource is the [facility] at [phone]-NRUY ([phone]). Pt will attend a follow up appointment in three weeks.     O:  MSE:    Appearance    cooperative  Appetite abnormal: poor/overeating  Sleep disturbance Yes  Fatigue Yes  Loss of pleasure Yes  Impulsive behavior No  Speech    normal rate  Mood euthymic   Affect    normal affect  Thought Content    intact  Thought Process    coherent  Associations    logical connections  Insight    Fair  Judgment    Intact  Orientation    oriented to person, place, time, and general circumstances  Memory    recent and remote memory intact  Attention/Concentration    intact  Morbid ideation No  Suicide Assessment    no suicidal ideation; hx of fleeting thoughts prior to starting medication; preventively discussed crisis supports. History:    TOBACCO:   reports that he has been smoking cigarettes. He has a 12.00 pack-year smoking history. He has quit using smokeless tobacco.  His smokeless tobacco use included chew. ETOH:   reports no history of alcohol use. Family History:   Family History   Problem Relation Age of Onset    Heart Disease Mother 52        MI    Early Death Father         MVA    Prostate Cancer Paternal Grandfather         unsure age   Geovanny Kingston High Blood Pressure Other         Pt unsure if anyone in family does.  Colon Cancer Neg Hx        A:       Diagnosis:    1. Moderate episode of recurrent major depressive disorder ([facility] [address]    2. Generalized anxiety disorder          Diagnosis Date    Dependence on nicotine from chewing tobacco 6/23/2015    Eczema 6/23/2015    GERD (gastroesophageal reflux disease)     History of asthma     Hypertension 9/30/2014    Irritable bowel syndrome with diarrhea     Major depression     Migraine headache     NAFLD (nonalcoholic fatty liver disease)     Obesity (BMI 30-39. 9)     Rheumatoid arthritis ([facility] [address]     sees Dr Liliana Rogers S/P robotic assisted LEFT inguinal hernia repair 11/13/2020    Smoker        Plan: Pt will attend a follow up appointment in three weeks to assess symptoms and evaluate effectiveness of coping skills.       Pt interventions:  Provided handout on  depression, suicidal thoughts/threats and anxiety, Trained in relaxation strategies and Discussed self-care (sleep, nutrition, rewarding activities, social support, exercise)    Pt Behavioral Change Plan:   1. Pt will read handouts about depression, anxiety, and self care. 2. Pt will practice daily deep breathing skills for 3-5 minutes, 2-3x's daily. 3. . Pt will prioritize effective self care- sleep, increased physical outlet, relaxation  4. Pt will familiarize self with [employer] [facility]. 5. Pt will follow up with MAXI Arrington  6Pt was advised of indications of depressive symptoms and instructed to monitor if symptoms worsen or interfere with daily functioning, to consider following-up with either your primary care team or a behavioral health provider for possible counseling or medication management. If suicidal thoughts are experienced, call 911. An additional 24/7 resource is the [facility] at [phone] ([phone]).

## 2021-01-05 NOTE — PATIENT INSTRUCTIONS
1.Disputation:  Challenging Upsetting Thinking    Examine your thoughts for key words:  1. must, need, got to, have to, should (unrealistic standards)  2. never, always, completely, totally, all everything, everyone (predictions /  labeling)  3. awful, terrible, horrible, unbearable, disaster, worst ever (labeling / predictions)  4. jerk, slob, creep, hypocrite, bully, stupid (labels)  Dispute or question the accuracy of the questionable thoughts. 1. Am I upsetting myself unnecessarily? How can I see this another way? 2. Is my thinking working for or against me? How could I view this in a less upsetting way? 3. What am I demanding must happen? What do I want or prefer, rather than need? 4. Am I making something too terrible? Is it really that awful? What would be so terrible about that? 5. Am I labeling a person? What is the action that I dont like? 6. Whats untrue about my thoughts? How can I stick to the facts? Whats the proof for what I am thinking or believing about this? 7. Am I using extreme, black-and-white language? What less extreme words might be more accurate? 8. Am I fortune telling or mind reading in a way that gets me upset or unhappy? What are the odds (percent chance -- e.g., there is a 5% chance. ..) that it will really turn out the way Im thinking or imagining? 9. What are my options in this situation? How would I like to respond? 10. Create more moderate, helpful, or realistic statements to replace the upsetting ones. 11. Have I had any experiences that show that this thought might not be totally true? 12. If my best friend or someone I loved had this thought, what would I tell them? 15. If my best friend or someone I loved knew I was thinking this thought, what would they say to me? What evidence would they point out to me that would suggest that my thought is not completely true? 14. Are there strengths in me or positives in the situation that I am ignoring?   Am I underestimating my ability to cope with unfortunate circumstances? 15. When I am not feeling this way, do I think about this situation any differently? How?  16. Have I been in this type of situation before? What happened? What have I learned from prior experiences that could help me now? 17. Five years from now, if I look back on this situation, will I look at it any differently? Will I focus on any different part of my experience? 25. Am I blaming myself for something over which I do not have complete control? 2.Pt was advised of indications of depressive symptoms and instructed to monitor if symptoms worsen or interfere with daily functioning, to consider following-up with either your primary care team or a behavioral health provider for possible counseling or medication management. If suicidal thoughts are experienced, call 911. An additional 24/7 resource is the [facility] at [phone]-QAWV ([phone]). 2.   Deep Breathing Exercises      Exercise 1:  The Stimulating Breath (also called the Akbar Breath)   Its aim is to raise energy level and increase alertness. - Inhale and exhale rapidly through your nose, keeping your mouth closed but relaxed. Your breaths in and out should be equal in duration, but as short as possible. This is a noisy breathing exercise. - Try for three in-and-out breath cycles per second. This produces a quick movement of the diaphragm, suggesting a akbar. Breathe normally after each cycle. - Do not do for more than 15 seconds on your first try. Each time you practice the Stimulating Breath, you can increase your time by five seconds or so, until you reach a full minute. If done properly, you may feel invigorated, comparable to the heightened awareness you feel after a good workout. You should feel the effort at the back of the neck, the diaphragm, the chest and the abdomen.  Try this breathing exercise the next time you need an energy boost and feel yourself reaching for a cup of coffee. Exercise 2:  The 4-7-8 (or Relaxing Breath) Exercise  This exercise is utterly simple, takes almost no time, requires no equipment and can be done anywhere. Although you can do the exercise in any position, sit with your back straight while learning the exercise. Place the tip of your tongue against the ridge of tissue just behind your upper front teeth, and keep it there through the entire exercise. You will be exhaling through your mouth around your tongue; try pursing your lips slightly if this seems awkward.  Exhale completely through your mouth, making a whoosh sound.  Close your mouth and inhale quietly through your nose to a mental count of four.  Hold your breath for a count of seven.  Exhale completely through your mouth, making a whoosh sound to a count of eight.  This is one breath. Now inhale again and repeat the cycle three more times for a total of four breaths. Note that you always inhale quietly through your nose and exhale audibly through your mouth. The tip of your tongue stays in position the whole time. Exhalation takes twice as long as inhalation. The absolute time you spend on each phase is not important; the ratio of 4:7:8 is important. If you have trouble holding your breath, speed the exercise up but keep to the ratio of 4:7:8 for the three phases. With practice you can slow it all down and get used to inhaling and exhaling more and more deeply. This exercise is a natural tranquilizer for the nervous system. Unlike tranquilizing drugs, which are often effective when you first take them but then lose their power over time, this exercise is subtle when you first try it but gains in power with repetition and practice. Do it at least twice a day. You cannot do it too frequently. Do NOT do more than 4 breaths at one time for the first month of practice. Later, if you wish, you can extend it to eight breaths.  If you feel a little lightheaded when you first breathe this way, do not be concerned; it will pass. Once you develop this technique by practicing it every day, it will be a very useful tool that you will always have with you. Use it whenever anything upsetting happens - before you react. Use it whenever you are aware of internal tension. Use it to help you fall asleep. This exercise cannot be recommended too highly. Everyone can benefit from it. Exercise 3: Meditation exercise  Breath Counting  If you want to get a feel for this challenging work, try your hand at breath counting, a deceptively simple technique. Sit in a comfortable position with the spine straight and head inclined slightly forward. Gently close your eyes and take a few deep breaths. Then let the breath come naturally without trying to influence it. Ideally it will be quiet and slow, but depth and rhythm may vary.  To begin the exercise, count \"one\" to yourself as you exhale.  The next time you exhale, count \"two,\" and so on up to [place]. \"   Then begin a new cycle, counting \"one\" on the next exhalation. Never count higher than \"five,\" and count only when you exhale. You will know your attention has wandered when you find yourself up to \"eight,\" \"12,\" even \"19. \"  Try to do 10 minutes of this form of meditation. 3. Pt will prioritize effective self care- sleep, increased physical outlet, relaxation  4. Pt will familiarize self with [facility].   5. Pt will follow up with MAXI Key

## 2021-01-26 ENCOUNTER — OFFICE VISIT (OUTPATIENT)
Dept: BEHAVIORAL/MENTAL HEALTH CLINIC | Age: 36
End: 2021-01-26
Payer: COMMERCIAL

## 2021-01-26 DIAGNOSIS — F33.1 MODERATE EPISODE OF RECURRENT MAJOR DEPRESSIVE DISORDER (HCC): Primary | ICD-10-CM

## 2021-01-26 DIAGNOSIS — F41.1 GENERALIZED ANXIETY DISORDER: ICD-10-CM

## 2021-01-26 PROCEDURE — 90832 PSYTX W PT 30 MINUTES: CPT | Performed by: SOCIAL WORKER

## 2021-01-26 PROCEDURE — 4004F PT TOBACCO SCREEN RCVD TLK: CPT | Performed by: SOCIAL WORKER

## 2021-01-26 NOTE — PROGRESS NOTES
Behavioral Health Consultation  RUSS Aranda-S  1/26/2021  9:00 AM EST    This note will not be viewable in [employer] for the following reason(s). This is a Psychotherapy Note. Time spent with Patient:30 minutes  This is patient's third [facility] appointment. Reason for Consult:    Chief Complaint   Patient presents with    Depression    Anxiety     Referring Provider: Crystal Chaidez DO  Pt provided informed consent for the behavioral health program. Discussed with patient model of service to include the limits of confidentiality (i.e. abuse reporting, suicide intervention, etc.) and short-term intervention focused approach. Pt indicated understanding. Feedback given to PCP. S:  Pt assessed that symptoms of depression and anxiety continue to progressively improve. He indicated things evident of this as feeling more stable in his mood and less reactive to things that trigger onset of anxiety. Pt determined that he continued to use coping skills of deep breathing, distraction, and reading. He recognized that he has been more aware of symptoms at onset and is able to respond more effectively to his needs. Pt examined that his workplace and position there is stressful and discussed his needs of finding balance between work and home life. Pt was advised of indications of depressive symptoms and instructed to monitor if symptoms worsen or interfere with daily functioning, to consider following-up with either your primary care team or a behavioral health provider for possible counseling or medication management. If suicidal thoughts are experienced, call 911. An additional 24/7 resource is the [facility] at [phone] ([phone]). No further appointments will be scheduled. Pt will contact the office to schedule an appointment if needed.     O:  MSE:    Appearance    cooperative  Appetite abnormal: poor/overeating  Sleep disturbance Yes  Fatigue Yes  Loss of pleasure Yes  Impulsive behavior No  Speech    normal rate  Mood    euthymic   Affect    normal affect  Thought Content    intact  Thought Process    coherent  Associations    logical connections  Insight    Fair  Judgment    Intact  Orientation    oriented to person, place, time, and general circumstances  Memory    recent and remote memory intact  Attention/Concentration    intact  Morbid ideation No  Suicide Assessment    no suicidal ideation; hx of fleeting thoughts prior to starting medication; preventively discussed crisis supports. History:    TOBACCO:   reports that he has been smoking cigarettes. He has a 12.00 pack-year smoking history. He has quit using smokeless tobacco.  His smokeless tobacco use included chew. ETOH:   reports no history of alcohol use. Family History:   Family History   Problem Relation Age of Onset    Heart Disease Mother 52        MI    Early Death Father         MVA    Prostate Cancer Paternal Grandfather         unsure age   [employer] High Blood Pressure Other         Pt unsure if anyone in family does.  Colon Cancer Neg Hx        A:       Diagnosis:    1. Moderate episode of recurrent major depressive disorder ([address]    2. Generalized anxiety disorder          Diagnosis Date    Dependence on nicotine from chewing tobacco 6/23/2015    Eczema 6/23/2015    GERD (gastroesophageal reflux disease)     History of asthma     Hypertension 9/30/2014    Irritable bowel syndrome with diarrhea     Major depression     Migraine headache     NAFLD (nonalcoholic fatty liver disease)     Obesity (BMI 30-39. 9)     Rheumatoid arthritis ([address]     sees Dr Iris Petersen S/P robotic assisted LEFT inguinal hernia repair 11/13/2020    Smoker        Plan: No further appointments will be scheduled. Pt will contact the office to schedule an appointment if needed.       Pt interventions:  Provided handout on  depression, suicidal thoughts/threats and anxiety, Trained in relaxation strategies and Discussed self-care (sleep, nutrition, rewarding activities, social support, exercise)    Pt Behavioral Change Plan:   1. Pt will read handouts about depression, anxiety, and self care. 2. Pt will practice daily deep breathing skills for 3-5 minutes, 2-3x's daily. 3. . Pt will prioritize effective self care- sleep, increased physical outlet, relaxation  4. Pt will familiarize self with [facility]. 5. No further appointments will be scheduled. Pt will contact the office to schedule an appointment if needed. 6Pt was advised of indications of depressive symptoms and instructed to monitor if symptoms worsen or interfere with daily functioning, to consider following-up with either your primary care team or a behavioral health provider for possible counseling or medication management. If suicidal thoughts are experienced, call 911. An additional 24/7 resource is the [facility] at [phone] ([phone]).

## 2021-01-26 NOTE — PATIENT INSTRUCTIONS
1.Disputation:  Challenging Upsetting Thinking    Examine your thoughts for key words:  1. must, need, got to, have to, should (unrealistic standards)  2. never, always, completely, totally, all everything, everyone (predictions /  labeling)  3. awful, terrible, horrible, unbearable, disaster, worst ever (labeling / predictions)  4. jerk, slob, creep, hypocrite, bully, stupid (labels)  Dispute or question the accuracy of the questionable thoughts. 1. Am I upsetting myself unnecessarily? How can I see this another way? 2. Is my thinking working for or against me? How could I view this in a less upsetting way? 3. What am I demanding must happen? What do I want or prefer, rather than need? 4. Am I making something too terrible? Is it really that awful? What would be so terrible about that? 5. Am I labeling a person? What is the action that I dont like? 6. Whats untrue about my thoughts? How can I stick to the facts? Whats the proof for what I am thinking or believing about this? 7. Am I using extreme, black-and-white language? What less extreme words might be more accurate? 8. Am I fortune telling or mind reading in a way that gets me upset or unhappy? What are the odds (percent chance -- e.g., there is a 5% chance. ..) that it will really turn out the way Im thinking or imagining? 9. What are my options in this situation? How would I like to respond? 10. Create more moderate, helpful, or realistic statements to replace the upsetting ones. 11. Have I had any experiences that show that this thought might not be totally true? 12. If my best friend or someone I loved had this thought, what would I tell them? 15. If my best friend or someone I loved knew I was thinking this thought, what would they say to me? What evidence would they point out to me that would suggest that my thought is not completely true? 14. Are there strengths in me or positives in the situation that I am ignoring?   Am yourself reaching for a cup of coffee. Exercise 2:  The 4-7-8 (or Relaxing Breath) Exercise  This exercise is utterly simple, takes almost no time, requires no equipment and can be done anywhere. Although you can do the exercise in any position, sit with your back straight while learning the exercise. Place the tip of your tongue against the ridge of tissue just behind your upper front teeth, and keep it there through the entire exercise. You will be exhaling through your mouth around your tongue; try pursing your lips slightly if this seems awkward.  Exhale completely through your mouth, making a whoosh sound.  Close your mouth and inhale quietly through your nose to a mental count of four.  Hold your breath for a count of seven.  Exhale completely through your mouth, making a whoosh sound to a count of eight.  This is one breath. Now inhale again and repeat the cycle three more times for a total of four breaths. Note that you always inhale quietly through your nose and exhale audibly through your mouth. The tip of your tongue stays in position the whole time. Exhalation takes twice as long as inhalation. The absolute time you spend on each phase is not important; the ratio of 4:7:8 is important. If you have trouble holding your breath, speed the exercise up but keep to the ratio of 4:7:8 for the three phases. With practice you can slow it all down and get used to inhaling and exhaling more and more deeply. This exercise is a natural tranquilizer for the nervous system. Unlike tranquilizing drugs, which are often effective when you first take them but then lose their power over time, this exercise is subtle when you first try it but gains in power with repetition and practice. Do it at least twice a day. You cannot do it too frequently. Do NOT do more than 4 breaths at one time for the first month of practice. Later, if you wish, you can extend it to eight breaths.  If you feel a little lightheaded when you first breathe this way, do not be concerned; it will pass. Once you develop this technique by practicing it every day, it will be a very useful tool that you will always have with you. Use it whenever anything upsetting happens - before you react. Use it whenever you are aware of internal tension. Use it to help you fall asleep. This exercise cannot be recommended too highly. Everyone can benefit from it. Exercise 3: Meditation exercise  Breath Counting  If you want to get a feel for this challenging work, try your hand at breath counting, a deceptively simple technique. Sit in a comfortable position with the spine straight and head inclined slightly forward. Gently close your eyes and take a few deep breaths. Then let the breath come naturally without trying to influence it. Ideally it will be quiet and slow, but depth and rhythm may vary.  To begin the exercise, count \"one\" to yourself as you exhale.  The next time you exhale, count \"two,\" and so on up to [place]. \"   Then begin a new cycle, counting \"one\" on the next exhalation. Never count higher than \"five,\" and count only when you exhale. You will know your attention has wandered when you find yourself up to \"eight,\" \"12,\" even \"19. \"  Try to do 10 minutes of this form of meditation. 3. Pt will prioritize effective self care- sleep, increased physical outlet, relaxation  4. Pt will familiarize self with [facility].   5. Pt will follow up with MAXI Key

## 2021-05-18 ENCOUNTER — HOSPITAL ENCOUNTER (OUTPATIENT)
Age: 36
Discharge: HOME | End: 2021-05-18
Payer: COMMERCIAL

## 2021-05-18 LAB
BASOPHILS # BLD: 0.8 %
BASOPHILS ABSOLUTE: 0.1 THOU/MM3 (ref 0–0.1)
EOSINOPHIL # BLD: 2.2 %
EOSINOPHILS ABSOLUTE: 0.2 THOU/MM3 (ref 0–0.4)
ERYTHROCYTE [DISTWIDTH] IN BLOOD BY AUTOMATED COUNT: 13.6 % (ref 11.5–14.5)
ERYTHROCYTE [DISTWIDTH] IN BLOOD BY AUTOMATED COUNT: 49.1 FL (ref 35–45)
HCT VFR BLD CALC: 48.9 % (ref 42–52)
HEMOGLOBIN: 15.9 GM/DL (ref 14–18)
IMMATURE GRANS (ABS): 0.05 THOU/MM3 (ref 0–0.07)
IMMATURE GRANULOCYTES: 0.7 %
LYMPHOCYTES # BLD: 32 %
LYMPHOCYTES ABSOLUTE: 2.3 THOU/MM3 (ref 1–4.8)
MCH RBC QN AUTO: 31.9 PG (ref 26–33)
MCHC RBC AUTO-ENTMCNC: 32.5 GM/DL (ref 32.2–35.5)
MCV RBC AUTO: 98.2 FL (ref 80–94)
MONOCYTES # BLD: 8.2 %
MONOCYTES ABSOLUTE: 0.6 THOU/MM3 (ref 0.4–1.3)
NUCLEATED RED BLOOD CELLS: 0 /100 WBC
PLATELET # BLD: 282 THOU/MM3 (ref 130–400)
PMV BLD AUTO: 10.1 FL (ref 9.4–12.4)
RBC # BLD: 4.98 MILL/MM3 (ref 4.7–6.1)
SEG NEUTROPHILS: 56.1 %
SEGMENTED NEUTROPHILS ABSOLUTE COUNT: 4 THOU/MM3 (ref 1.8–7.7)
WBC # BLD: 7.2 THOU/MM3 (ref 4.8–10.8)

## 2021-05-18 PROCEDURE — 82565 ASSAY OF CREATININE: CPT

## 2021-05-18 PROCEDURE — 36415 COLL VENOUS BLD VENIPUNCTURE: CPT

## 2021-05-18 PROCEDURE — 82040 ASSAY OF SERUM ALBUMIN: CPT

## 2021-05-18 PROCEDURE — 85651 RBC SED RATE NONAUTOMATED: CPT

## 2021-05-18 PROCEDURE — 84460 ALANINE AMINO (ALT) (SGPT): CPT

## 2021-05-18 PROCEDURE — 85025 COMPLETE CBC W/AUTO DIFF WBC: CPT

## 2021-05-19 LAB
ALBUMIN SERPL-MCNC: 4.6 G/DL (ref 3.5–5.1)
ALT SERPL-CCNC: 41 U/L (ref 11–66)
CREAT SERPL-MCNC: 0.9 MG/DL (ref 0.4–1.2)
GFR SERPL CREATININE-BSD FRML MDRD: > 90 ML/MIN/1.73M2
SEDIMENTATION RATE, ERYTHROCYTE: 2 MM/HR (ref 0–10)

## 2021-06-02 ENCOUNTER — TELEPHONE (OUTPATIENT)
Dept: FAMILY MEDICINE CLINIC | Age: 36
End: 2021-06-02

## 2021-06-02 ENCOUNTER — VIRTUAL VISIT (OUTPATIENT)
Dept: FAMILY MEDICINE CLINIC | Age: 36
End: 2021-06-02
Payer: COMMERCIAL

## 2021-06-02 VITALS — RESPIRATION RATE: 16 BRPM

## 2021-06-02 DIAGNOSIS — F32.4 MAJOR DEPRESSIVE DISORDER WITH SINGLE EPISODE, IN PARTIAL REMISSION (HCC): Primary | ICD-10-CM

## 2021-06-02 DIAGNOSIS — F17.220 CHEWING TOBACCO NICOTINE DEPENDENCE WITHOUT COMPLICATION: ICD-10-CM

## 2021-06-02 DIAGNOSIS — F17.210 CIGARETTE NICOTINE DEPENDENCE WITHOUT COMPLICATION: ICD-10-CM

## 2021-06-02 PROCEDURE — 99214 OFFICE O/P EST MOD 30 MIN: CPT | Performed by: FAMILY MEDICINE

## 2021-06-02 PROCEDURE — G8427 DOCREV CUR MEDS BY ELIG CLIN: HCPCS | Performed by: FAMILY MEDICINE

## 2021-06-02 RX ORDER — ESCITALOPRAM OXALATE 20 MG/1
20 TABLET ORAL DAILY
Qty: 90 TABLET | Refills: 1 | Status: SHIPPED | OUTPATIENT
Start: 2021-06-02 | End: 2022-08-28 | Stop reason: SDUPTHER

## 2021-06-02 NOTE — TELEPHONE ENCOUNTER
Pt states he would like to do a video visit today with dr Prince Dalton. He said this is the number he wants to use, [phone]. im unable to schedule because it doesn't show the rest of your day at all. Not sure why.

## 2021-06-02 NOTE — TELEPHONE ENCOUNTER
Done    Future Appointments   Date Time Provider Tylor Reyes   6/2/2021  4:40 PM DO Rodolfo Jiménez [facility]   8/13/2021  8:20 AM Elin Chris, [address] [place] [address]

## 2021-06-02 NOTE — PROGRESS NOTES
Chief Complaint   Patient presents with    Follow-up     depression/tobacco use       TELEHEALTH EVALUATION -- Audio/Visual (During UOUKD-28 public health emergency)    Due to COVID 19 outbreak, patient's office visit was converted to a virtual visit. Patient was contacted and agreed to proceed with a virtual visit via [email or web address]. me  The risks and benefits of converting to a virtual visit were discussed in light of the current infectious disease epidemic. Patient also understood that insurance coverage and co-pays are up to their individual insurance plans. Services were provided through a video synchronous discussion virtually to substitute for in-person clinic visit    Location of patient: home  Location of physician: office    Identification confirmed by: Patient : 1985    Pursuant to the emergency declaration under the [facility], On license of [facility] waiver authority and the [employer] Act, this Virtual  Visit was conducted, with patient's (and/or legal guardian's) consent, to reduce the patient's risk of exposure to COVID-19 and provide necessary medical care. The patient (and/or legal guardian) has also been advised to contact this office for worsening conditions or problems, and seek emergency medical treatment and/or call 911 if deemed necessary. Services were provided through a video synchronous discussion virtually to substitute for in-person clinic visit. Due to this being a TeleHealth encounter, evaluation of certain organ systems is limited. History obtained from the patient. SUBJECTIVE:  Lewis Holter is a 38 y.o. male that presents today for       -Depressed Mood LAST VISIT:    HPI:  Been feeling depressed for the last year  Never dx before or treated  Has occ vague thoughts of not being around  No active SI  No HI    Depressed Mood? yes -   Anhedonia? yes -   Appetite changes?   yes -   Sleep disturbances? yes -   Feelings of guilt? yes -   Decreased energy? yes -   Impaired concentration? yes -   Substance abuse? no      UPDATE TODAY:   Lost to f/u on this  N/s on 29 JAN 2021  On 10 mg lexapro  Working well overall, still feeling some depression  No side effects  No SI/HI  Asking if can inc dose of lexapro       -Tobacco:  Is still chewing tobacco, not interested in quitting  Has started smoking again as well since, 1/2 PPD. Not interested in quitting that either. Age/Gender Health Maintenance    Lipid -   No components found for: CHLPL  Lab Results   Component Value Date    TRIG 77 08/05/2020    TRIG 113 07/30/2019    TRIG 113 06/24/2017     Lab Results   Component Value Date    HDL 31 08/05/2020    HDL 39 07/30/2019    HDL 35 06/24/2017     Lab Results   Component Value Date    LDLCALC 112 08/05/2020    [address] 91 07/30/2019    [address] 132 06/24/2017     No results found for: LABVLDL      DM Screen -   Lab Results   Component Value Date    GLUCOSE 106 11/09/2020     Lab Results   Component Value Date    LABA1C 5.2 08/06/2019       Colon Cancer Screening - Age 47    Tetanus - Declines July 2017/July 2019  Influenza Vaccine - Declines FALL 2020  Pneumonia Vaccine - Age 71  Zoster - age 47     PSA testing discussion - Age 53  AAA Screening - Age 71; smoked      Current Outpatient Medications   Medication Sig Dispense Refill    escitalopram (LEXAPRO) 20 MG tablet Take 1 tablet by mouth daily 90 tablet 1    meloxicam (MOBIC) 15 MG tablet Take 1 tablet by mouth daily as needed for Pain 30 tablet 3    Methotrexate, PF, 12.5 MG/0.4ML SOAJ Inject 15 mg into the skin once a week       Folic Acid 5 MG CAPS Take 5 mg by mouth daily      lisinopril-hydroCHLOROthiazide (PRINZIDE) 20-12.5 MG per tablet Take 1 tablet by mouth daily 90 tablet 3    omeprazole (PRILOSEC) 20 MG delayed release capsule Take 1 capsule by mouth daily 90 capsule 3     No current facility-administered medications for this visit. Orders Placed This Encounter   Medications    escitalopram (LEXAPRO) 20 MG tablet     Sig: Take 1 tablet by mouth daily     Dispense:  90 tablet     Refill:  1         All medications reviewed and reconciled, including OTC and herbal medications. Updated list given to patient. Patient Active Problem List   Diagnosis    History of asthma    Migraine headache    Hypertension    Dependence on nicotine from chewing tobacco    Eczema    GERD (gastroesophageal reflux disease)    Irritable bowel syndrome with diarrhea    NAFLD (nonalcoholic fatty liver disease)    Rheumatoid arthritis (HCC)    Cervicalgia    Chronic bilateral low back pain without sciatica    Obesity (BMI 30-39. 9)    Smoker    S/P robotic assisted LEFT inguinal hernia repair    Major depression       Past Medical History:   Diagnosis Date    Dependence on nicotine from chewing tobacco 6/23/2015    Eczema 6/23/2015    GERD (gastroesophageal reflux disease)     History of asthma     Hypertension 9/30/2014    Irritable bowel syndrome with diarrhea     Major depression     Migraine headache     NAFLD (nonalcoholic fatty liver disease)     Obesity (BMI 30-39. 9)     Rheumatoid arthritis [facility])     sees Dr Diana Fernandez S/P robotic assisted LEFT inguinal hernia repair 11/13/2020    Smoker        Past Surgical History:   Procedure Laterality Date    APPENDECTOMY  05/02/2013    Dr. Rebecca Salguero COLONOSCOPY  2015    [id number]  2018, 2019    [address] SURGERY  10/17/15    left pinky finger    HERNIA REPAIR Left 11/13/2020    ROBOTIC LEFT INGUINAL HERNIA REPAIR, with mesh performed by Kyra Miranda [facility] at [facility]  05/02/2013    Dr. Sonja Hooper ENDOSCOPY  2017    [facility]        Allergies   Allergen Reactions    Plaquenil [Hydroxychloroquine Sulfate] Nausea And Vomiting         Social History     Tobacco Use    Smoking status: Current Every Day Smoker     Packs/day: 1.00     Years: 12.00     Pack years: 12.00     Types: Cigarettes    Smokeless tobacco: Former User     Types: Chew   Substance Use Topics    Alcohol use: No     Comment: rare       Family History   Problem Relation Age of Onset    Heart Disease Mother 52        MI    Early Death Father         MVA    Prostate Cancer Paternal Grandfather         unsure age   Cerna High Blood Pressure Other         Pt unsure if anyone in family does.  Colon Cancer Neg Hx          I have reviewed the patient's past medical history, past surgical history, allergies, medications, social and family history and I have made updates where appropriate.       Review of Systems  Positive responses are highlighted in bold    Constitutional:  Fever, Chills, Night Sweats, Fatigue, Unexpected changes in weight  HENT:  Ear pain, Tinnitus, Nosebleeds, Trouble swallowing, Hearing loss, Sore throat  Cardiovascular:  Chest Pain, Palpitations, Orthopnea, Paroxysmal Nocturnal Dyspnea  Respiratory:  Cough, Wheezing, Shortness of breath, Chest tightness, Apnea  Gastrointestinal:  Nausea, Vomiting, Diarrhea, Constipation, Heartburn, Blood in stool  Genitourinary:  Difficulty or painful urination, Flank pain, Change in frequency, Urgency  Skin:  Color change, Rash, Itching, Wound  Musculoskeletal:  Joint pain, Back pain, Gait problems, Joint swelling, Myalgias  Neurological:  Dizziness, Headaches, Presyncope, Numbness, Seizures, Tremors  Endocrine:  Heat Intolerance, Cold Intolerance, Polydipsia, Polyphagia, Polyuria      PHYSICAL EXAM:  Not all vitals able to be obtained, video visit  Patient-Reported Vitals 6/2/2021   Patient-Reported Pulse 72   Patient-Reported Temperature 98.4      Vitals:    06/02/21 1630   Resp: 16      Pain: 0    General Appearance: A&O x 3, No acute distress,well developed and well- nourished  Head: normocephalic and atraumatic  Eyes: pupils equal, round, and reactive to light, extraocular eye movements intact, conjunctivae and eye lids without erythema  ENT: External ears w/o redness, external nares without redness or discharge. Hearing is intact. Lips are w/o lesion. Teeth are in good repair. Pulmonary/Chest: normal respiratory effort. Normal respiratory rate. No respiratory distress . Skin: warm and dry, no rash or erythema to visible areas. Psych: Affect constricted but less so than last visit. Mood depressed but less so than last visit. Thought process is normal without evidence of depression or psychosis. Good insight and appropriate interaction. Cognition and memory appear to be intact. ASSESSMENT & PLAN  1. Major depressive disorder with single episode, in partial remission ([facility] [address]    Inc lexapro from 10 to 20mg  F/u August as scheduled, sooner any changes  F/u therapy as needed    Discussed side effects and benefits of lexapro. I advised him that if he develops any SI/HI or concerning symptoms after starting the medication he needs to stop the medication and seek treatment immediately    - escitalopram (LEXAPRO) 20 MG tablet; Take 1 tablet by mouth daily  Dispense: 90 tablet; Refill: 1    2. Chewing tobacco nicotine dependence without complication    In precontemplation stage and not ready to quit. Declines cessation, aware of risks of continued smoking as well as resources available to help quit. These include tobacco cessation classes as well as 1-800-QUIT NOW. No barriers other than lack of desire. 3+ min spent counseling. 3. Cigarette nicotine dependence without complication    As per # 2      DISPOSITION    Return in 2 months (on 8/13/2021) for follow-up on chronic medical conditions, sooner as needed. Jennifer Chan released without restrictions.     Future Appointments   Date Time Provider Tylor Reyes   6/2/2021  4:40 PM [facility]   8/13/2021  8:20 AM Herman Meyer DO [address] COUNSELING    Barriers to learning and self management: none    Discussed use, benefit, and side effects of prescribed medications. Barriers to medication compliance addressed. All patient questions answered. Pt voiced understanding.        Electronically signed by Merlene Soliz DO on 6/2/2021 at 4:39 PM

## 2021-07-05 ENCOUNTER — HOSPITAL ENCOUNTER (OUTPATIENT)
Age: 36
Setting detail: OBSERVATION
Discharge: HOME | End: 2021-07-07
Attending: OPHTHALMOLOGY | Admitting: OPHTHALMOLOGY
Payer: COMMERCIAL

## 2021-07-05 ENCOUNTER — APPOINTMENT (OUTPATIENT)
Dept: GENERAL RADIOLOGY | Age: 36
End: 2021-07-05
Payer: COMMERCIAL

## 2021-07-05 DIAGNOSIS — F17.200 TOBACCO DEPENDENCY: ICD-10-CM

## 2021-07-05 DIAGNOSIS — R07.9 CHEST PAIN, UNSPECIFIED TYPE: Primary | ICD-10-CM

## 2021-07-05 LAB
ALBUMIN SERPL-MCNC: 4.3 G/DL (ref 3.5–5.1)
ALP BLD-CCNC: 97 U/L (ref 38–126)
ALT SERPL-CCNC: 25 U/L (ref 11–66)
ANION GAP SERPL CALCULATED.3IONS-SCNC: 10 MEQ/L (ref 8–16)
AST SERPL-CCNC: 16 U/L (ref 5–40)
BASOPHILS # BLD: 0.3 %
BASOPHILS ABSOLUTE: 0 THOU/MM3 (ref 0–0.1)
BILIRUB SERPL-MCNC: 0.6 MG/DL (ref 0.3–1.2)
BUN BLDV-MCNC: 7 MG/DL (ref 7–22)
CALCIUM SERPL-MCNC: 9.5 MG/DL (ref 8.5–10.5)
CHLORIDE BLD-SCNC: 103 MEQ/L (ref 98–111)
CO2: 23 MEQ/L (ref 23–33)
CREAT SERPL-MCNC: 0.7 MG/DL (ref 0.4–1.2)
D-DIMER QUANTITATIVE: < 215 NG/ML FEU (ref 0–500)
EKG ATRIAL RATE: 59 BPM
EKG ATRIAL RATE: 94 BPM
EKG P AXIS: 0 DEGREES
EKG P AXIS: 51 DEGREES
EKG P-R INTERVAL: 136 MS
EKG P-R INTERVAL: 150 MS
EKG Q-T INTERVAL: 346 MS
EKG Q-T INTERVAL: 388 MS
EKG QRS DURATION: 86 MS
EKG QRS DURATION: 90 MS
EKG QTC CALCULATION (BAZETT): 384 MS
EKG QTC CALCULATION (BAZETT): 432 MS
EKG R AXIS: -19 DEGREES
EKG R AXIS: -20 DEGREES
EKG T AXIS: -11 DEGREES
EKG T AXIS: 42 DEGREES
EKG VENTRICULAR RATE: 59 BPM
EKG VENTRICULAR RATE: 94 BPM
EOSINOPHIL # BLD: 0.4 %
EOSINOPHILS ABSOLUTE: 0 THOU/MM3 (ref 0–0.4)
ERYTHROCYTE [DISTWIDTH] IN BLOOD BY AUTOMATED COUNT: 13.1 % (ref 11.5–14.5)
ERYTHROCYTE [DISTWIDTH] IN BLOOD BY AUTOMATED COUNT: 46.9 FL (ref 35–45)
GFR SERPL CREATININE-BSD FRML MDRD: > 90 ML/MIN/1.73M2
GLUCOSE BLD-MCNC: 132 MG/DL (ref 70–108)
HCT VFR BLD CALC: 51.7 % (ref 42–52)
HEMOGLOBIN: 17.1 GM/DL (ref 14–18)
IMMATURE GRANS (ABS): 0.04 THOU/MM3 (ref 0–0.07)
IMMATURE GRANULOCYTES: 0.4 %
LYMPHOCYTES # BLD: 17.4 %
LYMPHOCYTES ABSOLUTE: 1.8 THOU/MM3 (ref 1–4.8)
MCH RBC QN AUTO: 31.8 PG (ref 26–33)
MCHC RBC AUTO-ENTMCNC: 33.1 GM/DL (ref 32.2–35.5)
MCV RBC AUTO: 96.3 FL (ref 80–94)
MONOCYTES # BLD: 5.1 %
MONOCYTES ABSOLUTE: 0.5 THOU/MM3 (ref 0.4–1.3)
NUCLEATED RED BLOOD CELLS: 0 /100 WBC
OSMOLALITY CALCULATION: 271.8 MOSMOL/KG (ref 275–300)
PLATELET # BLD: 256 THOU/MM3 (ref 130–400)
PMV BLD AUTO: 10.2 FL (ref 9.4–12.4)
POTASSIUM SERPL-SCNC: 3.8 MEQ/L (ref 3.5–5.2)
PRO-BNP: 12.9 PG/ML (ref 0–450)
PROCALCITONIN: 0.04 NG/ML (ref 0.01–0.09)
RBC # BLD: 5.37 MILL/MM3 (ref 4.7–6.1)
SARS-COV-2, NAAT: NOT DETECTED
SEG NEUTROPHILS: 76.4 %
SEGMENTED NEUTROPHILS ABSOLUTE COUNT: 7.9 THOU/MM3 (ref 1.8–7.7)
SODIUM BLD-SCNC: 136 MEQ/L (ref 135–145)
TOTAL PROTEIN: 7.2 G/DL (ref 6.1–8)
TROPONIN T: < 0.01 NG/ML
WBC # BLD: 10.3 THOU/MM3 (ref 4.8–10.8)

## 2021-07-05 PROCEDURE — 71045 X-RAY EXAM CHEST 1 VIEW: CPT

## 2021-07-05 PROCEDURE — 80053 COMPREHEN METABOLIC PANEL: CPT

## 2021-07-05 PROCEDURE — 6370000000 HC RX 637 (ALT 250 FOR IP): Performed by: PHYSICIAN ASSISTANT

## 2021-07-05 PROCEDURE — 94761 N-INVAS EAR/PLS OXIMETRY MLT: CPT

## 2021-07-05 PROCEDURE — 93010 ELECTROCARDIOGRAM REPORT: CPT | Performed by: INTERNAL MEDICINE

## 2021-07-05 PROCEDURE — 94640 AIRWAY INHALATION TREATMENT: CPT

## 2021-07-05 PROCEDURE — 84145 PROCALCITONIN (PCT): CPT

## 2021-07-05 PROCEDURE — 36415 COLL VENOUS BLD VENIPUNCTURE: CPT

## 2021-07-05 PROCEDURE — 99285 EMERGENCY DEPT VISIT HI MDM: CPT

## 2021-07-05 PROCEDURE — G0378 HOSPITAL OBSERVATION PER HR: HCPCS

## 2021-07-05 PROCEDURE — 87635 SARS-COV-2 COVID-19 AMP PRB: CPT

## 2021-07-05 PROCEDURE — 83880 ASSAY OF NATRIURETIC PEPTIDE: CPT

## 2021-07-05 PROCEDURE — 93005 ELECTROCARDIOGRAM TRACING: CPT | Performed by: PHYSICIAN ASSISTANT

## 2021-07-05 PROCEDURE — 6370000000 HC RX 637 (ALT 250 FOR IP): Performed by: OPHTHALMOLOGY

## 2021-07-05 PROCEDURE — 85379 FIBRIN DEGRADATION QUANT: CPT

## 2021-07-05 PROCEDURE — 99219 PR INITIAL OBSERVATION CARE/DAY 50 MINUTES: CPT | Performed by: OPHTHALMOLOGY

## 2021-07-05 PROCEDURE — 84484 ASSAY OF TROPONIN QUANT: CPT

## 2021-07-05 PROCEDURE — 93005 ELECTROCARDIOGRAM TRACING: CPT | Performed by: OPHTHALMOLOGY

## 2021-07-05 PROCEDURE — 85025 COMPLETE CBC W/AUTO DIFF WBC: CPT

## 2021-07-05 RX ORDER — BISACODYL 10 MG
10 SUPPOSITORY, RECTAL RECTAL PRN
Status: DISCONTINUED | OUTPATIENT
Start: 2021-07-05 | End: 2021-07-07 | Stop reason: HOSPADM

## 2021-07-05 RX ORDER — FOLIC ACID 1 MG/1
5 TABLET ORAL DAILY
Status: DISCONTINUED | OUTPATIENT
Start: 2021-07-05 | End: 2021-07-07 | Stop reason: HOSPADM

## 2021-07-05 RX ORDER — ONDANSETRON 2 MG/ML
4 INJECTION INTRAMUSCULAR; INTRAVENOUS EVERY 6 HOURS PRN
Status: DISCONTINUED | OUTPATIENT
Start: 2021-07-05 | End: 2021-07-07 | Stop reason: HOSPADM

## 2021-07-05 RX ORDER — IPRATROPIUM BROMIDE AND ALBUTEROL SULFATE 2.5; .5 MG/3ML; MG/3ML
1 SOLUTION RESPIRATORY (INHALATION)
Status: DISCONTINUED | OUTPATIENT
Start: 2021-07-05 | End: 2021-07-07 | Stop reason: HOSPADM

## 2021-07-05 RX ORDER — PANTOPRAZOLE SODIUM 40 MG/1
40 TABLET, DELAYED RELEASE ORAL
Status: DISCONTINUED | OUTPATIENT
Start: 2021-07-06 | End: 2021-07-07 | Stop reason: HOSPADM

## 2021-07-05 RX ORDER — ACETAMINOPHEN 325 MG/1
650 TABLET ORAL EVERY 4 HOURS PRN
Status: DISCONTINUED | OUTPATIENT
Start: 2021-07-05 | End: 2021-07-07 | Stop reason: HOSPADM

## 2021-07-05 RX ORDER — HYDROCHLOROTHIAZIDE 25 MG/1
12.5 TABLET ORAL DAILY
Status: DISCONTINUED | OUTPATIENT
Start: 2021-07-06 | End: 2021-07-07 | Stop reason: HOSPADM

## 2021-07-05 RX ORDER — ASPIRIN 81 MG/1
324 TABLET, CHEWABLE ORAL ONCE
Status: COMPLETED | OUTPATIENT
Start: 2021-07-05 | End: 2021-07-05

## 2021-07-05 RX ORDER — POTASSIUM CHLORIDE 7.45 MG/ML
10 INJECTION INTRAVENOUS PRN
Status: DISCONTINUED | OUTPATIENT
Start: 2021-07-05 | End: 2021-07-07 | Stop reason: HOSPADM

## 2021-07-05 RX ORDER — POTASSIUM CHLORIDE 20 MEQ/1
40 TABLET, EXTENDED RELEASE ORAL PRN
Status: DISCONTINUED | OUTPATIENT
Start: 2021-07-05 | End: 2021-07-07 | Stop reason: HOSPADM

## 2021-07-05 RX ORDER — ESCITALOPRAM OXALATE 20 MG/1
20 TABLET ORAL DAILY
Status: DISCONTINUED | OUTPATIENT
Start: 2021-07-05 | End: 2021-07-07 | Stop reason: HOSPADM

## 2021-07-05 RX ORDER — NICOTINE 21 MG/24HR
1 PATCH, TRANSDERMAL 24 HOURS TRANSDERMAL DAILY
Status: DISCONTINUED | OUTPATIENT
Start: 2021-07-05 | End: 2021-07-07 | Stop reason: HOSPADM

## 2021-07-05 RX ORDER — NITROGLYCERIN 0.4 MG/1
0.4 TABLET SUBLINGUAL EVERY 5 MIN PRN
Status: COMPLETED | OUTPATIENT
Start: 2021-07-05 | End: 2021-07-05

## 2021-07-05 RX ORDER — GUAIFENESIN/DEXTROMETHORPHAN 100-10MG/5
10 SYRUP ORAL EVERY 4 HOURS PRN
Status: DISCONTINUED | OUTPATIENT
Start: 2021-07-05 | End: 2021-07-07 | Stop reason: HOSPADM

## 2021-07-05 RX ORDER — LISINOPRIL AND HYDROCHLOROTHIAZIDE 20; 12.5 MG/1; MG/1
1 TABLET ORAL DAILY
Status: DISCONTINUED | OUTPATIENT
Start: 2021-07-05 | End: 2021-07-05

## 2021-07-05 RX ORDER — LISINOPRIL 20 MG/1
20 TABLET ORAL DAILY
Status: DISCONTINUED | OUTPATIENT
Start: 2021-07-06 | End: 2021-07-07 | Stop reason: HOSPADM

## 2021-07-05 RX ADMIN — ASPIRIN 324 MG: 81 TABLET, CHEWABLE ORAL at 12:05

## 2021-07-05 RX ADMIN — IPRATROPIUM BROMIDE AND ALBUTEROL SULFATE 1 AMPULE: .5; 3 SOLUTION RESPIRATORY (INHALATION) at 16:43

## 2021-07-05 RX ADMIN — NITROGLYCERIN 0.4 MG: 0.4 TABLET, ORALLY DISINTEGRATING SUBLINGUAL at 12:11

## 2021-07-05 RX ADMIN — ESCITALOPRAM OXALATE 20 MG: 20 TABLET ORAL at 20:41

## 2021-07-05 RX ADMIN — NITROGLYCERIN 0.4 MG: 0.4 TABLET, ORALLY DISINTEGRATING SUBLINGUAL at 12:05

## 2021-07-05 RX ADMIN — IPRATROPIUM BROMIDE AND ALBUTEROL SULFATE 1 AMPULE: .5; 3 SOLUTION RESPIRATORY (INHALATION) at 22:31

## 2021-07-05 RX ADMIN — FOLIC ACID 5 MG: 1 TABLET ORAL at 20:40

## 2021-07-05 RX ADMIN — NITROGLYCERIN 0.4 MG: 0.4 TABLET, ORALLY DISINTEGRATING SUBLINGUAL at 20:43

## 2021-07-05 ASSESSMENT — ENCOUNTER SYMPTOMS
RHINORRHEA: 0
PHOTOPHOBIA: 0
COUGH: 1
SORE THROAT: 0
SHORTNESS OF BREATH: 1
ABDOMINAL PAIN: 0
NAUSEA: 0
DIARRHEA: 0
VOMITING: 0

## 2021-07-05 ASSESSMENT — PAIN DESCRIPTION - DESCRIPTORS
DESCRIPTORS: SHARP
DESCRIPTORS: ACHING
DESCRIPTORS: SHARP

## 2021-07-05 ASSESSMENT — PAIN DESCRIPTION - ORIENTATION
ORIENTATION: MID
ORIENTATION: LEFT
ORIENTATION: MID
ORIENTATION: LEFT
ORIENTATION: LEFT

## 2021-07-05 ASSESSMENT — PAIN DESCRIPTION - PROGRESSION
CLINICAL_PROGRESSION: GRADUALLY IMPROVING
CLINICAL_PROGRESSION: GRADUALLY WORSENING
CLINICAL_PROGRESSION: GRADUALLY IMPROVING
CLINICAL_PROGRESSION: GRADUALLY WORSENING

## 2021-07-05 ASSESSMENT — PAIN SCALES - GENERAL
PAINLEVEL_OUTOF10: 2
PAINLEVEL_OUTOF10: 7
PAINLEVEL_OUTOF10: 3
PAINLEVEL_OUTOF10: 5
PAINLEVEL_OUTOF10: 3

## 2021-07-05 ASSESSMENT — PAIN DESCRIPTION - ONSET
ONSET: ON-GOING

## 2021-07-05 ASSESSMENT — PAIN DESCRIPTION - FREQUENCY
FREQUENCY: CONTINUOUS

## 2021-07-05 ASSESSMENT — PAIN DESCRIPTION - PAIN TYPE
TYPE: ACUTE PAIN

## 2021-07-05 ASSESSMENT — PAIN - FUNCTIONAL ASSESSMENT
PAIN_FUNCTIONAL_ASSESSMENT: ACTIVITIES ARE NOT PREVENTED
PAIN_FUNCTIONAL_ASSESSMENT: ACTIVITIES ARE NOT PREVENTED

## 2021-07-05 ASSESSMENT — PAIN DESCRIPTION - LOCATION
LOCATION: CHEST

## 2021-07-05 NOTE — ED NOTES
Patient is resting in bed with easy and unlabored respirations. Call light in reach. Side rails up x2. Patient denies further complaints or concerns. Will monitor.         Raylene Claude, RN  07/05/21 1136 no strength deficits were identified

## 2021-07-05 NOTE — ED NOTES
Pt transported to [facility] on cart in stable condition. Floor contacted before transport. Spoke with [facility].      Juan Acevedo  07/05/21 [id number]

## 2021-07-05 NOTE — LETTER
[facility]  [address] [facility] [address]  Phone: [phone]             July 7, 2021    Patient: Anupam Garnica   YOB: 1985   Date of Visit: 7/5/2021       To Whom It May Concern:    Abdullahi Monsalve was seen and treated in our facility  beginning 7/5/2021 until 7/7/2021. He may return to work on 7/8/2021.       Sincerely,       Dr. Matos Rota        Signature:__________________________________

## 2021-07-05 NOTE — ED PROVIDER NOTES
[address] [facility] [address] [address] EMERGENCY DEPT      CHIEF COMPLAINT       Chief Complaint   Patient presents with    Chest Pain    Shortness of Breath       Nurses Notes reviewed and I agree except as noted in the HPI. HISTORY OF PRESENT ILLNESS    Jeff Sellers is a 38 y.o. male who presents for chest pain. It alternates between dull and sharp. Patient has had exertional dyspnea for the last 1 to 2 weeks. Today he woke up at 1100 with constant anterior chest pain. He is also having diaphoresis. Mr Martinez has not had anything like this pain in the past.  He has had a cough for the last 2 days which causes some nausea and posttussive emesis. Patient states that when he does any type of activity he becomes shaky and weak. Patient denies fever, chills, URI symptoms, abdominal pain, or other complaints. The patient has a history of hypertension, GERD, and rheumatoid arthritis. He smokes 2 to 2-1/2 packs of cigarettes a day. There is no alcohol or illicit drug use. Patient informs me that his mother  at age 52 of a MI and she had rheumatoid arthritis as well. Location/Symptom: Anterior chest pain  Timing/Onset: Woke up with it at 1100  Context/Setting: Spontaneous onset however has been short of breath the last 1 to 2 weeks  Quality: Alternating between dull and sharp  Duration: Constant  Modifying Factors: Worse with deep breath and exertion; decreased with sleep  Severity: Mild to moderate    REVIEW OF SYSTEMS     Review of Systems   Constitutional: Positive for diaphoresis. Negative for chills and fever. HENT: Negative for congestion, rhinorrhea and sore throat. Eyes: Negative for photophobia. Respiratory: Positive for cough and shortness of breath. Cardiovascular: Positive for chest pain. Gastrointestinal: Negative for abdominal pain, diarrhea, nausea and vomiting. Genitourinary: Negative for difficulty urinating. Musculoskeletal: Negative for gait problem. Skin: Negative for rash. Neurological: Positive for tremors and weakness. Negative for dizziness, light-headedness and numbness. Psychiatric/Behavioral: Negative for confusion. The patient is not nervous/anxious. PAST MEDICAL HISTORY    has a past medical history of Dependence on nicotine from chewing tobacco, Eczema, GERD (gastroesophageal reflux disease), History of asthma, Hypertension, Irritable bowel syndrome with diarrhea, Major depression, Migraine headache, NAFLD (nonalcoholic fatty liver disease), Obesity (BMI 30-39.9), Rheumatoid arthritis ([facility] [address] S/P robotic assisted LEFT inguinal hernia repair, and Smoker. SURGICAL HISTORY      has a past surgical history that includes Colonoscopy (); laparoscopic appendectomy (2013); Appendectomy (2013); Tonsillectomy; Finger surgery (10/17/15); Upper gastrointestinal endoscopy (); Dental surgery (, ); and hernia repair (Left, 2020). CURRENT MEDICATIONS       Previous Medications    ESCITALOPRAM (LEXAPRO) 20 MG TABLET    Take 1 tablet by mouth daily    FOLIC ACID 5 MG CAPS    Take 5 mg by mouth daily    LISINOPRIL-HYDROCHLOROTHIAZIDE (PRINZIDE) 20-12.5 MG PER TABLET    Take 1 tablet by mouth daily    MELOXICAM (MOBIC) 15 MG TABLET    Take 1 tablet by mouth daily as needed for Pain    METHOTREXATE, PF, 12.5 MG/0.4ML SOAJ    Inject 15 mg into the skin once a week     OMEPRAZOLE (PRILOSEC) 20 MG DELAYED RELEASE CAPSULE    Take 1 capsule by mouth daily       ALLERGIES     is allergic to plaquenil [hydroxychloroquine sulfate]. FAMILY HISTORY     He indicated that his mother is . He indicated that his father is . He indicated that the status of his paternal grandfather is unknown. He indicated that the status of his other is unknown.  He indicated that the status of his neg hx is unknown.   family history includes Early Death in his father; Heart Disease (age of onset: 52) in his mother; High Blood Pressure in an other family member; Prostate Cancer in his paternal grandfather. SOCIAL HISTORY    reports that he has been smoking cigarettes. He has a 12.00 pack-year smoking history. He has quit using smokeless tobacco.  His smokeless tobacco use included chew. He reports that he does not drink alcohol and does not use drugs. PHYSICAL EXAM     INITIAL VITALS:  weight is 205 lb (93 kg). His oral temperature is 98.1 °F (36.7 °C). His blood pressure is 128/89 and his pulse is 81. His respiration is 19 and oxygen saturation is 98%. Physical Exam  Constitutional:       Appearance: Normal appearance. He is well-developed. He is not ill-appearing or diaphoretic. HENT:      Head: Normocephalic and atraumatic. Right Ear: Hearing normal.      Left Ear: Hearing normal.      Nose: Nose normal. No rhinorrhea. Mouth/Throat:      Lips: Pink. Mouth: Mucous membranes are moist.      Pharynx: Oropharynx is clear. Eyes:      General: Lids are normal. No scleral icterus. Extraocular Movements: Extraocular movements intact. Conjunctiva/sclera: Conjunctivae normal.      Pupils: Pupils are equal, round, and reactive to light. Neck:      Trachea: Trachea normal.   Cardiovascular:      Rate and Rhythm: Normal rate and regular rhythm. Heart sounds: Normal heart sounds. No murmur heard. Pulmonary:      Effort: Pulmonary effort is normal.      Breath sounds: Normal breath sounds and air entry. No decreased breath sounds, wheezing or rhonchi. Abdominal:      General: There is no distension. Palpations: Abdomen is soft. Tenderness: There is no abdominal tenderness. Musculoskeletal:      Cervical back: Normal range of motion and neck supple. No rigidity. Comments: Well perfused; movement normal as observed; no signs of DVT   Lymphadenopathy:      Cervical: No cervical adenopathy. Skin:     General: Skin is warm and dry. Findings: No rash. Neurological:      General: No focal deficit present. Mental Status: He is alert. Sensory: Sensation is intact. Motor: Motor function is intact. Gait: Gait is intact. Psychiatric:         Mood and Affect: Mood normal.         Speech: Speech normal.         Behavior: Behavior is cooperative. DIFFERENTIAL DIAGNOSIS:   Including but not limited to: ACS, ischemia, costochondritis, COPD, pneumonia    DIAGNOSTIC RESULTS     EKG: All EKG's are [facility] who either signs or Co-signs this chart in the absence of a cardiologist.  Ventricular Rate BPM 94 P    Atrial Rate BPM 94 P    P-R Interval ms 136 P    QRS Duration ms 86 P    Q-T Interval ms 346 P    QTc Calculation (Bazett) ms 432 P    P Axis degrees 51 P    R Axis degrees -19 P    T Axis degrees 42 P    Narrative & Impression    Normal sinus rhythm  Normal ECG  When compared with ECG of 09-NOV-2020 12:13,  Nonspecific T wave abnormality has replaced inverted T waves in Inferior leads         RADIOLOGY: non-plain film images(s) such as CT,Ultrasound and MRI are read by the radiologist.  Plain radiographic images are visualized and preliminarily interpreted by the emergency physician unless otherwise stated below. XR CHEST PORTABLE   Final Result   No interval change since previous study dated 18 May 2020, no acute cardiopulmonary disease. .               **This report has been created using voice recognition software. It may contain minor errors which are inherent in voice recognition technology. **      Final report electronically signed by DR Raffi Gil on 7/5/2021 12:05 PM          LABS:   Labs Reviewed   CBC WITH AUTO DIFFERENTIAL - Abnormal; Notable for the following components:       Result Value    MCV 96.3 (*)     RDW-SD 46.9 (*)     Segs Absolute 7.9 (*)     All other components within normal limits   COMPREHENSIVE METABOLIC PANEL - Abnormal; Notable for the following components:    Glucose 132 (*)     All other components within normal limits OSMOLALITY - Abnormal; Notable for the following components:    Osmolality Calc 271.8 (*)     All other components within normal limits   TROPONIN   D-DIMER, QUANTITATIVE   ANION GAP   GLOMERULAR FILTRATION RATE, ESTIMATED       EMERGENCY DEPARTMENT COURSE:   Vitals:    Vitals:    07/05/21 1131 07/05/21 1253   BP: (!) 156/102 128/89   Pulse: 89 81   Resp: 20 19   Temp: 98.1 °F (36.7 °C)    TempSrc: Oral    SpO2: 98% 98%   Weight: 205 lb (93 kg)      1342: Consulted Dr. Keaton Kemp in regards to admission    MDM:  The patient was seen by me in the emergency room for chest pain. Physical exam revealed 40-year-old gentleman in no distress. Vital signs reviewed and noted stable. Old records were reviewed. Appropriate laboratory and imaging studies were ordered and reviewed upon completion. Pertinent findings: Negative troponin and D-dimer. Normal chest x-ray. No significant EKG changes. Interventions: Aspirin, nitro    Reexamination: Patient's chest pain decreased. He was feeling mildly improved. Vital signs remained stable. Results were discussed with the patient and family member as well as desire for admission and they were amenable. Dr. Joseph Mera of our hospitalists' service was consulted and graciously accepted admission. The patient was admitted to the hospital in fair condition. CRITICAL CARE:   None    CONSULTS:  Dr. Joseph Mera (hospitalist)    PROCEDURES:  None    FINAL IMPRESSION      1. Chest pain, unspecified type    2. Tobacco dependency          DISPOSITION/PLAN     1. Chest pain, unspecified type    2.  Tobacco dependency    Admission    (Please note that portions of this note were completed with a voice recognition program.  Efforts were made to edit the dictations but occasionally words are mis-transcribed.)    Liz Sultana PA-C 07/05/21 1:48 PM    TANNER Olson PA-C  07/05/21 1185 [facility]  07/13/21 [id number]

## 2021-07-05 NOTE — H&P
History & Physical        Patient:  Reinaldo Griffin  YOB: 1985    MRN: [id number]   Acct:  [de-identified]   Primary Care Physician: Clifford Hall DO       Chief Complaint:  Chest pain    History of Present Illness:   History obtained from chart review and the patient. The patient is a 38 y.o. male who presented to [address] [place] [address] with chest pain. Started this am \"across my chest\" radiates to the left side of the neck, associated SOB. Pain and SOB are worse with activity. +N/V. + cough  SOB is going on for two weeks. No COVID vaccine  Chest pain is better with NTG        Past Medical History:        Diagnosis Date    Dependence on nicotine from chewing tobacco 6/23/2015    Eczema 6/23/2015    GERD (gastroesophageal reflux disease)     History of asthma     Hypertension 9/30/2014    Irritable bowel syndrome with diarrhea     Major depression     Migraine headache     NAFLD (nonalcoholic fatty liver disease)     Obesity (BMI 30-39. 9)     Rheumatoid arthritis [facility])     sees Dr Magy Oneil S/P robotic assisted LEFT inguinal hernia repair 11/13/2020    Smoker        Past Surgical History:        Procedure Laterality Date    APPENDECTOMY  05/02/2013    Dr. Zoey Eckert COLONOSCOPY  2015    [facility]  2018, 2019    [address] [facility] SURGERY  10/17/15    left pinky finger    HERNIA REPAIR Left 11/13/2020    ROBOTIC LEFT INGUINAL HERNIA REPAIR, with mesh performed by Brittni Harris MD at [facility]  05/02/2013    Dr. Renu King ENDOSCOPY  2017    [facility] Home Medications:    Prior to Admission medications    Medication Sig Start Date End Date Taking?  Authorizing Provider   escitalopram (LEXAPRO) 20 MG tablet Take 1 tablet by mouth daily 6/2/21   Clifford Hall DO   meloxicam (MOBIC) 15 MG tablet Take 1 tablet by mouth daily as needed for Pain 12/18/20   Jonas Bolanos DO Michelle   Methotrexate, PF, 12.5 MG/0.4ML SOAJ Inject 15 mg into the skin once a week     Historical Provider, MD   Folic Acid 5 MG CAPS Take 5 mg by mouth daily    Historical Provider, MD   lisinopril-hydroCHLOROthiazide (PRINZIDE) 20-12.5 MG per tablet Take 1 tablet by mouth daily 8/13/20   Geovanny Garcia DO   omeprazole (PRILOSEC) 20 MG delayed release capsule Take 1 capsule by mouth daily 8/13/20   Geovanny Garcia DO     Allergies:  Plaquenil [hydroxychloroquine sulfate]    Social History:    reports that he has been smoking cigarettes. He has a 12.00 pack-year smoking history. He has quit using smokeless tobacco.  His smokeless tobacco use included chew. He reports that he does not drink alcohol and does not use drugs. Family History:   Reviewed:       Problem Relation Age of Onset    Heart Disease Mother 52        MI    Early Death Father         MVA    Prostate Cancer Paternal Grandfather         unsure age   Dossie Lela High Blood Pressure Other         Pt unsure if anyone in family does.  Colon Cancer Neg Hx        Review of Systems:    Pertinent items are noted in HPI. Comprehensive review of systems was obtained . Review of Systems   Constitutional: Negative for fever and chills. + Fatigue. HENT: Negative for ear pain, sore throat, rhinorrhea and neck pain. Eyes: Negative for pain, discharge and visual disturbance. Respiratory: Negative for   wheezing. Cardiovascular: Negative for  palpitations and leg swelling. Gastrointestinal: Negative for  abdominal pain. no diarrhea. Genitourinary: Negative for dysuria,  difficulty urinating and pelvic pain. Musculoskeletal: . Negative for back pain, joint swelling and arthralgias. Skin: Negative for rash. Neurological: Negative for dizziness, seizures, syncope and headaches. Hematological: Negative for adenopathy. Psychiatric/Behavioral: Negative for suicidal ideas and dysphoric mood. The patient is not nervous/anxious. Physical Exam:  /89   Pulse 81   Temp 98.1 °F (36.7 °C) (Oral)   Resp 19   Wt 205 lb (93 kg)   SpO2 98%   BMI 30.27 kg/m²   General appearance:  No apparent distress, appears stated age and cooperative. HEENT:  Normal cephalic, atraumatic without obvious deformity. Pupils equal, round, and reactive to light. Extra ocular muscles intact. Conjunctivae/corneas clear. Neck: Supple, with full range of motion. No jugular venous distention. Trachea midline. Respiratory:  Normal respiratory effort. Decreased BS at bases bilaterally  without Rales/Wheezes/Rhonchi. Cardiovascular:  Regular rate and regular regular rhythm with normal S1/S2 without murmurs, rubs or gallops. Abdomen: Soft, non-tender, non-distended with normal bowel sounds. Musculoskeletal:  No clubbing, cyanosis. No edema bilaterally. Full range of motion without deformity. Skin: Skin color, texture, turgor normal.  No rashes or lesions. Neurologic:  Neurovascularly intact without any focal sensory/motor deficits. grossly non-focal.  Psychiatric:  Alert and oriented, thought content appropriate, normal insight  Capillary Refill: Brisk,< 3 seconds   Peripheral Pulses: +2 palpable, equal bilaterally     Review of Labs and Diagnostic Testing:  Labs:     Recent Labs     07/05/21  1140   WBC 10.3   HGB 17.1   HCT 51.7        Recent Labs     07/05/21  1140      K 3.8      CO2 23   BUN 7   CREATININE 0.7   CALCIUM 9.5     Recent Labs     07/05/21  1140   AST 16   ALT 25   BILITOT 0.6   ALKPHOS 97     Lab Results   Component Value Date    AMYLASE 60 05/02/2013     No results for input(s): INR in the last 72 hours. Recent Labs     07/05/21  1140   TROPONINT < 0.010     No results for input(s): BNP in the last 72 hours. No results for input(s): LACTA in the last 72 hours.   Lab Results   Component Value Date    PROCAL 0.07 12/03/2018     Lab Results   Component Value Date    LABA1C 5.2 08/06/2019     Lab Results Component Value Date    TSH 1.420 08/05/2020       Urinalysis:   Lab Results   Component Value Date    NITRU NEGATIVE 10/20/2020    WBCUA 0-2 10/20/2020    BACTERIA NONE SEEN 10/20/2020    RBCUA NONE SEEN 10/20/2020    BLOODU NEGATIVE 10/20/2020    SPECGRAV 1.019 10/20/2020    GLUCOSEU NEGATIVE 10/25/2014     Radiology:   Reviewed: see report for details  CXR: I have reviewed the report  EKG:  No acute changes:   XR CHEST PORTABLE   Final Result   No interval change since previous study dated 18 May 2020, no acute cardiopulmonary disease. .               **This report has been created using voice recognition software. It may contain minor errors which are inherent in voice recognition technology. **      Final report electronically signed by DR Rach Kan on 7/5/2021 12:05 PM          Code Status: Prior    Assessment/Plan:  Active Problems:    * No active hospital problems. *  Resolved Problems:    * No resolved hospital problems.  *     1-Chest pain   Radiates to left side of neck relieved by NTG   +chest wall tenderness   Likely not cardiac: check COVID, procalcitonin   CXR is normal   Serial troponin, 2D echo, BNP   FH of CAD:mother with MI in her 42's    Try DuoNeb   2-Depression:    Recent change of meds by PCP   Continue meds  3-Smoking   Advised to quit smoking  4-RA   On MTX     DVT prophylaxis: [x] Lovenox                                 [] SCDs                                 [] SQ Heparin                                 [] Encourage ambulation           [] Already on Anticoagulation    Stefanie Miller MD on 7/5/2021 at 1:55 PM  Admitting Hospitalist

## 2021-07-05 NOTE — ED NOTES
Patient is resting in bed with easy and unlabored respirations. Call light in reach. Side rails up x2. Patient denies further complaints or concerns. Will monitor.         Sharan Varghese RN  07/05/21 0083

## 2021-07-06 LAB
AMPHETAMINE+METHAMPHETAMINE URINE SCREEN: NEGATIVE
ANION GAP SERPL CALCULATED.3IONS-SCNC: 11 MEQ/L (ref 8–16)
BARBITURATE QUANTITATIVE URINE: NEGATIVE
BENZODIAZEPINE QUANTITATIVE URINE: NEGATIVE
BUN BLDV-MCNC: 10 MG/DL (ref 7–22)
CALCIUM SERPL-MCNC: 9.2 MG/DL (ref 8.5–10.5)
CANNABINOID QUANTITATIVE URINE: NEGATIVE
CHLORIDE BLD-SCNC: 105 MEQ/L (ref 98–111)
CO2: 23 MEQ/L (ref 23–33)
COCAINE METABOLITE QUANTITATIVE URINE: NEGATIVE
CREAT SERPL-MCNC: 0.8 MG/DL (ref 0.4–1.2)
EKG ATRIAL RATE: 71 BPM
EKG P AXIS: 15 DEGREES
EKG P-R INTERVAL: 136 MS
EKG Q-T INTERVAL: 374 MS
EKG QRS DURATION: 88 MS
EKG QTC CALCULATION (BAZETT): 406 MS
EKG R AXIS: -22 DEGREES
EKG T AXIS: -11 DEGREES
EKG VENTRICULAR RATE: 71 BPM
GFR SERPL CREATININE-BSD FRML MDRD: > 90 ML/MIN/1.73M2
GLUCOSE BLD-MCNC: 84 MG/DL (ref 70–108)
LV EF: 58 %
LVEF MODALITY: NORMAL
OPIATES, URINE: NEGATIVE
OXYCODONE: NEGATIVE
PHENCYCLIDINE QUANTITATIVE URINE: NEGATIVE
POTASSIUM SERPL-SCNC: 3.8 MEQ/L (ref 3.5–5.2)
SODIUM BLD-SCNC: 139 MEQ/L (ref 135–145)
TROPONIN T: < 0.01 NG/ML

## 2021-07-06 PROCEDURE — 96372 THER/PROPH/DIAG INJ SC/IM: CPT

## 2021-07-06 PROCEDURE — 6370000000 HC RX 637 (ALT 250 FOR IP): Performed by: OPHTHALMOLOGY

## 2021-07-06 PROCEDURE — 94640 AIRWAY INHALATION TREATMENT: CPT

## 2021-07-06 PROCEDURE — 93010 ELECTROCARDIOGRAM REPORT: CPT | Performed by: INTERNAL MEDICINE

## 2021-07-06 PROCEDURE — 99225 PR SBSQ OBSERVATION CARE/DAY 25 MINUTES: CPT | Performed by: INTERNAL MEDICINE

## 2021-07-06 PROCEDURE — 6360000002 HC RX W HCPCS: Performed by: OPHTHALMOLOGY

## 2021-07-06 PROCEDURE — 36415 COLL VENOUS BLD VENIPUNCTURE: CPT

## 2021-07-06 PROCEDURE — 99243 OFF/OP CNSLTJ NEW/EST LOW 30: CPT | Performed by: INTERNAL MEDICINE

## 2021-07-06 PROCEDURE — 93306 TTE W/DOPPLER COMPLETE: CPT

## 2021-07-06 PROCEDURE — 93005 ELECTROCARDIOGRAM TRACING: CPT | Performed by: INTERNAL MEDICINE

## 2021-07-06 PROCEDURE — 84484 ASSAY OF TROPONIN QUANT: CPT

## 2021-07-06 PROCEDURE — G0378 HOSPITAL OBSERVATION PER HR: HCPCS

## 2021-07-06 PROCEDURE — 80048 BASIC METABOLIC PNL TOTAL CA: CPT

## 2021-07-06 PROCEDURE — 80307 DRUG TEST PRSMV CHEM ANLYZR: CPT

## 2021-07-06 RX ADMIN — FOLIC ACID 5 MG: 1 TABLET ORAL at 08:08

## 2021-07-06 RX ADMIN — LISINOPRIL 20 MG: 20 TABLET ORAL at 08:07

## 2021-07-06 RX ADMIN — HYDROCHLOROTHIAZIDE 12.5 MG: 25 TABLET ORAL at 08:09

## 2021-07-06 RX ADMIN — IPRATROPIUM BROMIDE AND ALBUTEROL SULFATE 1 AMPULE: .5; 3 SOLUTION RESPIRATORY (INHALATION) at 09:32

## 2021-07-06 RX ADMIN — IPRATROPIUM BROMIDE AND ALBUTEROL SULFATE 1 AMPULE: .5; 3 SOLUTION RESPIRATORY (INHALATION) at 17:16

## 2021-07-06 RX ADMIN — ENOXAPARIN SODIUM 40 MG: 40 INJECTION SUBCUTANEOUS at 16:59

## 2021-07-06 RX ADMIN — ACETAMINOPHEN 650 MG: 325 TABLET ORAL at 21:01

## 2021-07-06 RX ADMIN — IPRATROPIUM BROMIDE AND ALBUTEROL SULFATE 1 AMPULE: .5; 3 SOLUTION RESPIRATORY (INHALATION) at 13:19

## 2021-07-06 RX ADMIN — ESCITALOPRAM OXALATE 20 MG: 20 TABLET ORAL at 21:01

## 2021-07-06 RX ADMIN — IPRATROPIUM BROMIDE AND ALBUTEROL SULFATE 1 AMPULE: .5; 3 SOLUTION RESPIRATORY (INHALATION) at 21:40

## 2021-07-06 RX ADMIN — PANTOPRAZOLE SODIUM 40 MG: 40 TABLET, DELAYED RELEASE ORAL at 06:12

## 2021-07-06 ASSESSMENT — PAIN SCALES - GENERAL
PAINLEVEL_OUTOF10: 2
PAINLEVEL_OUTOF10: 0
PAINLEVEL_OUTOF10: 0
PAINLEVEL_OUTOF10: 2
PAINLEVEL_OUTOF10: 0

## 2021-07-06 ASSESSMENT — PAIN DESCRIPTION - PROGRESSION
CLINICAL_PROGRESSION: GRADUALLY WORSENING
CLINICAL_PROGRESSION: NOT CHANGED
CLINICAL_PROGRESSION: NOT CHANGED
CLINICAL_PROGRESSION: GRADUALLY WORSENING
CLINICAL_PROGRESSION: NOT CHANGED
CLINICAL_PROGRESSION: GRADUALLY WORSENING
CLINICAL_PROGRESSION: NOT CHANGED

## 2021-07-06 ASSESSMENT — PAIN DESCRIPTION - DESCRIPTORS
DESCRIPTORS: ACHING
DESCRIPTORS: ACHING

## 2021-07-06 ASSESSMENT — PAIN DESCRIPTION - FREQUENCY
FREQUENCY: CONTINUOUS
FREQUENCY: CONTINUOUS

## 2021-07-06 ASSESSMENT — PAIN DESCRIPTION - LOCATION
LOCATION: CHEST
LOCATION: CHEST

## 2021-07-06 ASSESSMENT — PAIN DESCRIPTION - ONSET
ONSET: ON-GOING
ONSET: ON-GOING

## 2021-07-06 ASSESSMENT — PAIN DESCRIPTION - PAIN TYPE
TYPE: ACUTE PAIN
TYPE: ACUTE PAIN

## 2021-07-06 ASSESSMENT — PAIN DESCRIPTION - ORIENTATION
ORIENTATION: MID;RIGHT
ORIENTATION: MID;RIGHT

## 2021-07-06 NOTE — PLAN OF CARE
Patient lung sounds are considered normal for their current lung condition. No signs of distress noted.  Current treatment regimen appropriate

## 2021-07-06 NOTE — PLAN OF CARE
Problem: Pain:  Goal: Pain level will decrease  Description: Pain level will decrease  Outcome: Ongoing  Note: Pt complains of pain at a 6/10. Non pharmacological interventions completed which include rest, and repositioning. Pt states pain goal at a 0/10. Pain assessed every 4 hours, and as needed. PRN pain medications given as ordered. Problem: Pain:  Goal: Control of acute pain  Description: Control of acute pain  Outcome: Ongoing  Note: Pt complains of pain at a 6/10. Non pharmacological interventions completed which include rest, and repositioning. Pt states pain goal at a 0/10. Pain assessed every 4 hours, and as needed. PRN pain medications given as ordered. Problem: Pain:  Goal: Control of chronic pain  Description: Control of chronic pain  Outcome: Ongoing  Note: Pt complains of pain at a 6/10. Non pharmacological interventions completed which include rest, and repositioning. Pt states pain goal at a 0/10. Pain assessed every 4 hours, and as needed. PRN pain medications given as ordered. Problem: Tissue Perfusion - Cardiopulmonary, Altered:  Goal: Absence of angina  Description: Absence of angina  Outcome: Ongoing  Note: Pt complained of chest pain this shift. Stat EKG ordered. Nitro SL given x 1. Problem: Discharge Planning:  Goal: Discharged to appropriate level of care  Description: Discharged to appropriate level of care  Outcome: Ongoing  Note: Pt plans to be discharged home when appropriate. Care plan reviewed with patient. Patient verbalize understanding of the plan of care and contribute to goal setting.

## 2021-07-06 NOTE — PROGRESS NOTES
[facility] CVICU 4B       Patient: Symone Saliva    Unit/Bed: 8H-05/558-G    YOB: 1985    MRN: [id number]    Acct: [de-identified]     Admitting Diagnosis:Chest pain [R07.9]    Admit Date:  7/5/2021    Hospital Day: 0    Subjective:    Patient is feeling fine. He has no shortness of breath or chest pain and shortness of breath. Chest pain was relieved by nitroglycerin. .    Admitted yesterday with a history of chest pain-patient was woken up because of substernal chest pain. Twelve-lead EKG which reviewed done on admission shows sinus bradycardia at 59 bpm.  T wave inversion in leads III and aVF. No ST segment elevation or depression. Risk factors for CHD include chronic tobacco use disorder (smokes 1 and half packs a day) hypertension and rheumatoid arthritis. Patient Seen, Chart, Labs, Radiology studies, and Consults reviewed. Objective:   /80   Pulse 72   Temp 98.1 °F (36.7 °C) (Oral)   Resp 20   Ht 5' 9\" (1.753 m)   Wt 205 lb (93 kg)   SpO2 98%   BMI 30.27 kg/m²   No intake or output data in the 24 hours ending 07/06/21 1351    Review of Systems   Constitutional: Negative for activity change, appetite change, chills, fatigue, fever and unexpected weight change. HENT: Negative for dental problem, drooling, ear pain, hearing loss, mouth sores, nosebleeds, rhinorrhea, sinus pressure, sneezing, sore throat, trouble swallowing and voice change. Eyes: Negative for photophobia, pain, discharge, redness, itching and visual disturbance. Respiratory: Negative for apnea, cough, choking, chest tightness, shortness of breath, wheezing and stridor. Cardiovascular: Positive for chest pain. Negative for palpitations and leg swelling.    Gastrointestinal: Negative for abdominal distention, abdominal pain, blood in stool, constipation, diarrhea, nausea, rectal pain and vomiting. Endocrine: Negative for cold intolerance, heat intolerance, polydipsia, polyphagia and polyuria. Genitourinary: Negative for decreased urine volume, difficulty urinating, discharge, dysuria, flank pain, frequency, genital sores, penile pain, scrotal swelling and testicular pain. Musculoskeletal: Negative for arthralgias, gait problem, joint swelling, myalgias and neck stiffness. Skin: Negative for pallor. Allergic/Immunologic: Negative for food allergies. Neurological: Negative for dizziness, seizures, syncope, facial asymmetry, weakness, light-headedness and headaches. Hematological: Negative for adenopathy. Does not bruise/bleed easily. Psychiatric/Behavioral: Negative for agitation, behavioral problems, confusion, dysphoric mood, hallucinations, self-injury, sleep disturbance and suicidal ideas. The patient is not nervous/anxious. Physical Exam  Vitals reviewed. Constitutional:       General: He is not in acute distress. Appearance: Normal appearance. He is obese. He is not ill-appearing, toxic-appearing or diaphoretic. HENT:      Head: Normocephalic and atraumatic. Right Ear: External ear normal.      Left Ear: External ear normal.      Nose: Nose normal. No congestion or rhinorrhea. Mouth/Throat:      Mouth: Mucous membranes are moist.      Pharynx: Oropharynx is clear. No oropharyngeal exudate or posterior oropharyngeal erythema. Eyes:      General: No scleral icterus. Right eye: No discharge. Left eye: No discharge. Extraocular Movements: Extraocular movements intact. Conjunctiva/sclera: Conjunctivae normal.      Pupils: Pupils are equal, round, and reactive to light. Neck:      Vascular: No carotid bruit. Cardiovascular:      Rate and Rhythm: Normal rate and regular rhythm. Pulses: Normal pulses. Heart sounds: No murmur heard. No friction rub. No gallop.     Pulmonary:      Effort: Pulmonary effort is normal. No respiratory distress. Breath sounds: Normal breath sounds. No stridor. No wheezing, rhonchi or rales. Chest:      Chest wall: No tenderness. Abdominal:      General: Bowel sounds are normal. There is no distension. Palpations: Abdomen is soft. There is no mass. Tenderness: There is no abdominal tenderness. There is no right CVA tenderness, left CVA tenderness, guarding or rebound. Hernia: No hernia is present. Musculoskeletal:         General: No swelling, tenderness, deformity or signs of injury. Normal range of motion. Cervical back: Normal range of motion and neck supple. No rigidity or tenderness. Right lower leg: No edema. Left lower leg: No edema. Lymphadenopathy:      Cervical: No cervical adenopathy. Skin:     General: Skin is warm. Coloration: Skin is not jaundiced or pale. Findings: No bruising, erythema, lesion or rash. Neurological:      General: No focal deficit present. Mental Status: He is alert and oriented to person, place, and time. Mental status is at baseline. Cranial Nerves: No cranial nerve deficit. Sensory: No sensory deficit. Motor: No weakness. Coordination: Coordination normal.      Gait: Gait normal.      Deep Tendon Reflexes: Reflexes normal.   Psychiatric:         Mood and Affect: Mood normal.         Behavior: Behavior normal.         Thought Content:  Thought content normal.         Judgment: Judgment normal.       Medications:    escitalopram  20 mg Oral Daily    folic acid  5 mg Oral Daily    pantoprazole  40 mg Oral QAM AC    nicotine  1 patch Transdermal Daily    ipratropium-albuterol  1 ampule Inhalation Q4H WA    enoxaparin  40 mg Subcutaneous Q24H    lisinopril  20 mg Oral Daily    And    hydroCHLOROthiazide  12.5 mg Oral Daily       Continuous Infusions:    PRN Meds:guaiFENesin-dextromethorphan, bisacodyl, ondansetron, acetaminophen, potassium chloride **OR** potassium alternative oral replacement **OR** potassium chloride, potassium chloride    Data:    CBC:   Recent Labs     07/05/21  1140   WBC 10.3   RBC 5.37   HGB 17.1   HCT 51.7   MCV 96.3*          BMP:   Recent Labs     07/05/21  1140 07/06/21  0340    139   K 3.8 3.8    105   CO2 23 23   BUN 7 10   CREATININE 0.7 0.8       PT/INR:No results for input(s): PROTIME, INR in the last 72 hours. APTT: No results for input(s): APTT in the last 72 hours. FASTING LIPID PANEL:  Lab Results   Component Value Date    CHOL 158 08/05/2020    HDL 31 08/05/2020    TRIG 77 08/05/2020     Results for Eliane Wright" (MRN [id number]) as of 7/6/2021 13:51   Ref. Range 5/12/2014 15:19 7/5/2021 11:40   D-Dimer, Quant Latest Ref Range: 0.00 - 500.00 ng/ml .18 < 215.00       Results for Eliane Wright" (MRN [id number]) as of 7/6/2021 13:51   Ref. Range 7/5/2021 11:40 7/5/2021 16:15 7/5/2021 21:38 7/6/2021 03:40   Troponin T Latest Units: ng/ml < 0.010 < 0.010 < 0.010 < 0.010     ABG. None. URINALYSIS. None. SEROLOGY  None. TUMOR MARKER. None. MICROBIOLOGY   None. HISTOPATHOLOGY. None. TOXICOLOGY. Results for Eliane Wright" (MRN [id number]) as of 7/7/2021 11:22   Ref. Range 7/6/2021 17:00   AMPHETAMINE+METHAMPHETAMINE URINE SCREEN Latest Ref Range: NEGATIVE  Negative   Barbiturate Quant, Ur Latest Ref Range: NEGATIVE  Negative   Benzodiazepine Quant, Ur Latest Ref Range: NEGATIVE  Negative   Cannabinoid Quant, Ur Latest Ref Range: NEGATIVE  Negative   Cocaine Metab Quant, Ur Latest Ref Range: NEGATIVE  Negative   PCP Quant, Ur Latest Ref Range: NEGATIVE  Negative   Opiates, Urine Latest Ref Range: NEGATIVE  Negative   Oxycodone Latest Ref Range: NEGATIVE  Negative       ENDOSCOPE STUDIES. None. PROCEDURES. None. RADIOLOGY. Echo-7/6/2021. SUMMARY:   Normal left ventricle size and systolic function. Ejection fraction was estimated at 55 to 60 %.      There were no regional left ventricular wall motion abnormalities and wall thickness   was within normal limits. CXR-7/5/2021. FINDINGS:  The heart size is normal.    The mediastinum is not widened. There are no pulmonary infiltrates or effusions. The pulmonary vascularity is normal.    No suspicious osseous lesions are present.     IMPRESSION:  No interval change since previous study dated 18 May 2020, no acute cardiopulmonary disease. .     Echo-5/26/2015. SUMMARY:   Left ventricle:  Size was normal.    Systolic function was normal.     Ejection fraction was estimated in the range of 55 % to 65 %. There were no regional wall motion abnormalities.     Mitral valve: There was mild regurgitation.     Tricuspid valve: There was mild regurgitation.     Isaias Gayle MD.    [facility] Test[facility]14[facility]. SUMMARY:   Exercise EKG stress test is not suggestive for ischemia. Calculated gated LVEF 61 %. The T.I.D. ratio was 1 . No segmental wall motion abnormalities on gated study. Myocardial perfusion imaging is not suggestive for myocardial ischemia. RECOMMENDATIONS:   Aggressive risk factor management. ASSESSMENT AND  PLAN. 1.NEUROVASCULAR. H/o migraine headaches. 2.PULMONARY. H/o asthma-asymptomatic. 3.CARDIOVASCULAR. Chest pain for ACS evaluation-negative troponin, aspirin, as needed nitro. Abnormal EKG with T wave inversion in leads III and aVF. Hypertension-controlled on lisinopril and HCTZ. Cardiology consult for ischemic work-up with stress test.      4.GI.     OCHOA      GERD on PPI    5. ENDO.     TSH and A1c check. 6.MUSCULOSKELETAL. Rheumatoid arthritis in remission-on methotrexate. 7.PSYCH. Depression-stable on Lexapro    8. LIFE STYLE. Chronic tobacco use disorder. 9.NUTRITION. Obesity w/ a BMI of 30.27-needs regular exercise, diet control for weight loss management. 10.DISPO.        Planned d/c to home soon.      Electronically signed Hilary Moon MD on 7/6/2021 at 1:51 PM    [address] [place] [address]

## 2021-07-07 ENCOUNTER — APPOINTMENT (OUTPATIENT)
Dept: NON INVASIVE DIAGNOSTICS | Age: 36
End: 2021-07-07
Payer: COMMERCIAL

## 2021-07-07 VITALS
RESPIRATION RATE: 14 BRPM | BODY MASS INDEX: 30.36 KG/M2 | WEIGHT: 205 LBS | HEART RATE: 59 BPM | TEMPERATURE: 97.9 F | DIASTOLIC BLOOD PRESSURE: 68 MMHG | SYSTOLIC BLOOD PRESSURE: 117 MMHG | HEIGHT: 69 IN | OXYGEN SATURATION: 99 %

## 2021-07-07 PROBLEM — R07.89 ATYPICAL CHEST PAIN: Status: ACTIVE | Noted: 2021-07-05

## 2021-07-07 PROCEDURE — G0378 HOSPITAL OBSERVATION PER HR: HCPCS

## 2021-07-07 PROCEDURE — 6370000000 HC RX 637 (ALT 250 FOR IP): Performed by: OPHTHALMOLOGY

## 2021-07-07 PROCEDURE — 93017 CV STRESS TEST TRACING ONLY: CPT | Performed by: INTERNAL MEDICINE

## 2021-07-07 PROCEDURE — 3430000000 HC RX DIAGNOSTIC RADIOPHARMACEUTICAL: Performed by: INTERNAL MEDICINE

## 2021-07-07 PROCEDURE — 99214 OFFICE O/P EST MOD 30 MIN: CPT | Performed by: STUDENT IN AN ORGANIZED HEALTH CARE EDUCATION/TRAINING PROGRAM

## 2021-07-07 PROCEDURE — A9500 TC99M SESTAMIBI: HCPCS | Performed by: INTERNAL MEDICINE

## 2021-07-07 PROCEDURE — 78452 HT MUSCLE IMAGE SPECT MULT: CPT

## 2021-07-07 PROCEDURE — 6360000002 HC RX W HCPCS

## 2021-07-07 PROCEDURE — 99217 PR OBSERVATION CARE DISCHARGE MANAGEMENT: CPT | Performed by: INTERNAL MEDICINE

## 2021-07-07 RX ADMIN — HYDROCHLOROTHIAZIDE 12.5 MG: 25 TABLET ORAL at 07:28

## 2021-07-07 RX ADMIN — PANTOPRAZOLE SODIUM 40 MG: 40 TABLET, DELAYED RELEASE ORAL at 06:09

## 2021-07-07 RX ADMIN — FOLIC ACID 5 MG: 1 TABLET ORAL at 07:28

## 2021-07-07 RX ADMIN — Medication 29.7 MILLICURIE: at 11:25

## 2021-07-07 RX ADMIN — LISINOPRIL 20 MG: 20 TABLET ORAL at 07:28

## 2021-07-07 RX ADMIN — Medication 9.6 MILLICURIE: at 10:20

## 2021-07-07 ASSESSMENT — ENCOUNTER SYMPTOMS
NAUSEA: 0
APNEA: 0
SORE THROAT: 0
STRIDOR: 0
EYE ITCHING: 0
PHOTOPHOBIA: 0
ABDOMINAL PAIN: 0
CHOKING: 0
EYE PAIN: 0
RECTAL PAIN: 0
DIARRHEA: 0
SINUS PRESSURE: 0
COUGH: 0
CHEST TIGHTNESS: 0
VOICE CHANGE: 0
EYE DISCHARGE: 0
TROUBLE SWALLOWING: 0
RHINORRHEA: 0
ABDOMINAL DISTENTION: 0
EYE REDNESS: 0
SHORTNESS OF BREATH: 0
VOMITING: 0
BLOOD IN STOOL: 0
WHEEZING: 0
CONSTIPATION: 0

## 2021-07-07 ASSESSMENT — PAIN DESCRIPTION - PROGRESSION
CLINICAL_PROGRESSION: NOT CHANGED

## 2021-07-07 ASSESSMENT — PAIN SCALES - GENERAL
PAINLEVEL_OUTOF10: 0
PAINLEVEL_OUTOF10: 0

## 2021-07-07 NOTE — PROGRESS NOTES
Discharge teaching and instructions for diagnosis/procedure of chest pain completed with patient using teachback method. AVS reviewed. Printed prescriptions given to patient. Patient voiced understanding regarding prescriptions, follow up appointments, and care of self at home. Discharged ambulatory and in a wheelchair to  home with support per family.

## 2021-07-07 NOTE — DISCHARGE SUMMARY
Hospitalist Discharge Summary-Internal Medicine. Patient Identification:   Johnny Armenta     : 1985    MRN: [id number]     Account: [de-identified]      Patient's PCP: Atif Cruz DO    Admit Date: 2021     Discharge Date:   21    Admitting Physician: Claudene Mola, MD     Discharge Physician: Xiao Dunne MD     Discharge Diagnoses: Active Hospital Problems    Diagnosis Date Noted    Atypical chest pain [R07.89] 2021    Major depression [F32.9]     Tobacco use disorder [F17.200]     Obesity (BMI 30-39. 9) [E66.9]     Rheumatoid arthritis ([address] [M06.9]     Irritable bowel syndrome with diarrhea [K58.0]     Hypertension [I10] 2014     The patient was seen and examined on day of discharge and this discharge summary is in conjunction with any daily progress note from day of discharge. Hospital Course:     Johnny Armenta is a 38 y.o. male admitted to [address] [place] [address] on 2021 for atypical chest pain. Acute coronary syndrome ruled out by negative enzymes, EKG and inpatient cardiac stress test.       Stable for home discharge today. HPI and Physical Exam:    History obtained from chart review and the patient.     The patient is a 38 y.o. male who presented to [address] [place] [address] with chest pain. Started this am \"across my chest\" radiates to the left side of the neck, associated SOB. Pain and SOB are worse with activity. +N/V. + cough  SOB is going on for two weeks. No COVID vaccine  Chest pain is better with NTG.     Vitals:  Vitals:    21 2056 21 0311 21 0724 21 1241   BP: (!) 101/55 (!) 107/59 117/65 117/68   Pulse: 71 81 59 59   Resp: 24 22 17 14   Temp: 97.8 °F (36.6 °C) 97.9 °F (36.6 °C) 97.6 °F (36.4 °C) 97.9 °F (36.6 °C)   TempSrc: Oral Oral Oral Oral   SpO2: 96% 93% 97% 99%   Weight:       Height:           Weight: Weight: 205 lb (93 kg)     24 hour intake/output:    Intake/Output Summary (Last 24 hours) at 7/7/2021 2316  Last data filed at 7/7/2021 1408  Gross per 24 hour   Intake 540 ml   Output --   Net 540 ml       Physical Exam  Vitals and nursing note reviewed. Constitutional:       General: He is not in acute distress. Appearance: Normal appearance. He is obese. He is not ill-appearing, toxic-appearing or diaphoretic. HENT:      Head: Normocephalic and atraumatic. Right Ear: External ear normal.      Left Ear: External ear normal.      Nose: Nose normal. No congestion or rhinorrhea. Mouth/Throat:      Mouth: Mucous membranes are moist.      Pharynx: Oropharynx is clear. No oropharyngeal exudate or posterior oropharyngeal erythema. Eyes:      General: No scleral icterus. Right eye: No discharge. Left eye: No discharge. Extraocular Movements: Extraocular movements intact. Conjunctiva/sclera: Conjunctivae normal.      Pupils: Pupils are equal, round, and reactive to light. Neck:      Vascular: No carotid bruit. Cardiovascular:      Rate and Rhythm: Normal rate and regular rhythm. Pulses: Normal pulses. Heart sounds: Normal heart sounds. No murmur heard. No friction rub. No gallop. Pulmonary:      Effort: Pulmonary effort is normal. No respiratory distress. Breath sounds: Normal breath sounds. No stridor. No wheezing, rhonchi or rales. Chest:      Chest wall: No tenderness. Abdominal:      General: Bowel sounds are normal. There is no distension. Palpations: Abdomen is soft. There is no mass. Tenderness: There is no abdominal tenderness. There is no right CVA tenderness, left CVA tenderness, guarding or rebound. Hernia: No hernia is present. Musculoskeletal:         General: No swelling, tenderness, deformity or signs of injury. Normal range of motion. Cervical back: Normal range of motion and neck supple. No rigidity or tenderness. Right lower leg: No edema. Left lower leg: No edema.    Lymphadenopathy: Cervical: No cervical adenopathy. Skin:     General: Skin is warm. Coloration: Skin is not jaundiced or pale. Findings: No bruising, erythema, lesion or rash. Neurological:      General: No focal deficit present. Mental Status: He is alert and oriented to person, place, and time. Mental status is at baseline. Cranial Nerves: No cranial nerve deficit. Sensory: No sensory deficit. Motor: No weakness. Coordination: Coordination normal.      Gait: Gait normal.      Deep Tendon Reflexes: Reflexes normal.   Psychiatric:         Mood and Affect: Mood normal.         Behavior: Behavior normal.         Thought Content: Thought content normal.         Judgment: Judgment normal.     CBC:    Lab Results   Component Value Date    WBC 10.3 07/05/2021    HGB 17.1 07/05/2021    HCT 51.7 07/05/2021     07/05/2021       Renal:    Lab Results   Component Value Date     07/06/2021    K 3.8 07/06/2021     07/06/2021    CO2 23 07/06/2021    BUN 10 07/06/2021    CREATININE 0.8 07/06/2021    CALCIUM 9.2 07/06/2021     Results for Jasmin Pae \"CARISSA\" (MRN [id number]) as of 7/7/2021 23:15   Ref. Range 7/5/2021 11:40 7/5/2021 16:15 7/5/2021 21:38 7/6/2021 03:40   Troponin T Latest Units: ng/ml < 0.010 < 0.010 < 0.010 < 0.010     URINALYSIS. None. MICROBIOLOGY AND PATHOLOGY STUDIES. None. TOXICOLOGY. Results for Rai Wills" (MRN [id number]) as of 7/7/2021 23:15   Ref.  Range 7/6/2021 17:00   AMPHETAMINE+METHAMPHETAMINE URINE SCREEN Latest Ref Range: NEGATIVE  Negative   Barbiturate Quant, Ur Latest Ref Range: NEGATIVE  Negative   Benzodiazepine Quant, Ur Latest Ref Range: NEGATIVE  Negative   Cannabinoid Quant, Ur Latest Ref Range: NEGATIVE  Negative   Cocaine Metab Quant, Ur Latest Ref Range: NEGATIVE  Negative   PCP Quant, Ur Latest Ref Range: NEGATIVE  Negative   Opiates, Urine Latest Ref Range: NEGATIVE  Negative   Oxycodone Latest Ref Range: NEGATIVE Negative     PROCEDURES. None. ENDOSCOPE STUDIES. None. RADIOLOGY STUDIES. Cardiac nuclear stress test-07/07/2021. SUMMARY:   Suann Pillar is moderate attenuation artifact noted in the inferior wall seems to be related to bowel artifact.      The nuclear images is not suggestive for myocardial ischemia.  RECOMMENDATIONS:    Medical management-no intervention.     Love vedrin is recommended.       Echo-7/6/2021. SUMMARY:   Normal left ventricle size and systolic function.      Ejection fraction was estimated at 55 to 60 %.    There were no regional left ventricular wall motion abnormalities and wall thickness   was within normal limits.     CXR-7/5/2021. FINDINGS:  The heart size is normal.     The mediastinum is not widened.       There are no pulmonary infiltrates or effusions.      The pulmonary vascularity is normal.     No suspicious osseous lesions are present.     IMPRESSION:  No interval change since previous study dated 18 May 2020, no acute cardiopulmonary disease. .     Echo-5/26/2015. SUMMARY:   Left ventricle:  Size was normal.     Systolic function was normal.      Ejection fraction was estimated in the range of 55 % to 65 %.     There were no regional wall motion abnormalities.     Mitral valve: There was mild regurgitation.     Tricuspid valve: There was mild regurgitation.     Emerson Marsh MD.     [facility] Card[facility] Test-8/14/2017. SUMMARY:   Exercise EKG stress test is not suggestive for ischemia.     Calculated gated LVEF 61 %.     The T.I.D. ratio was 1 .     No segmental wall motion abnormalities on gated study.     Myocardial perfusion imaging is not suggestive for myocardial ischemia.     RECOMMENDATIONS:   Aggressive risk factor management.   Consults:     IP CONSULT TO CARDIOLOGY    Disposition:    [x] Home       [] TCU       [] Rehab       [] Psych       [] SNF       [] [place]       [] Other-    Condition at Discharge: Stable    Code Status:  Prior     Patient Instructions:      Discharge lab work: Activity: activity as tolerated    Diet: No diet orders on file      Follow-up visits:     Brandee Villegas Dr[address]  [address] [facility]  [phone]    On 7/14/2021  3:00pm.     Pedro Luis Mcintosh MD  [address] [place] Garza [address]  [phone]    On 10/11/2021  9:30am.      Discharge Medications:      Mc Blanchard"   Home Medication Instructions JMS:[id number]    Printed on:07/07/21 3179   Medication Information                      escitalopram (LEXAPRO) 20 MG tablet  Take 1 tablet by mouth daily             Folic Acid 5 MG CAPS  Take 5 mg by mouth every other day              lisinopril-hydroCHLOROthiazide (PRINZIDE) 20-12.5 MG per tablet  Take 1 tablet by mouth daily             meloxicam (MOBIC) 15 MG tablet  Take 1 tablet by mouth daily as needed for Pain             Methotrexate, PF, 12.5 MG/0.4ML SOAJ  Inject 17.5 mg into the skin once a week Usually Wed. omeprazole (PRILOSEC) 20 MG delayed release capsule  Take 1 capsule by mouth daily                 Time Spent on discharge is more than 30 minutes in the examination, evaluation, counseling and review of medications and discharge plan. Signed: Thank you Tripp Guerrero DO for the opportunity to be involved in this patient's care.     Electronically signed by Mahsa Grove MD on 7/7/2021 at 11:16 PM

## 2021-07-07 NOTE — PROGRESS NOTES
Cardiology Progress Note      Patient:  Geraldine Gardiner  YOB: 1985  MRN: [id number]   Acct: [de-identified]  [facility] Date:  2021  Primary Cardiologist: none   Per Dr Brenda Harley note:  Sandeep Welch:    chest pain w/ TWI in Leads III and aVF- evaluate for an in patient stress test.      CHIEF COMPLAINT:    Chest pain      HISTORY OF PRESENT ILLNESS:    Geraldine Gardiner is a pleasant 38year old male patient with past medical history that includes:   Past Medical History        Past Medical History:   Diagnosis Date    Asthma      Dependence on nicotine from chewing tobacco 2015    Eczema 2015    GERD (gastroesophageal reflux disease)      History of asthma      Hypertension 2014    Irritable bowel syndrome with diarrhea      Major depression      Migraine headache      NAFLD (nonalcoholic fatty liver disease)      Obesity (BMI 30-39. 9)      Rheumatoid arthritis [facility])       sees Dr Christiano Leach S/P robotic assisted LEFT inguinal hernia repair 2020    Smoker        The patient was admitted to the hospital for CP. For the past 2 days, he had intermittent episodes of left sided, non-exertional, non-radiating chest pain up to 6/10. He also report back pain. He reports associated SOB with CP. Stress test two years ago was negative for ischemia. Patient denies orthopnea, paroxysmal nocturnal dyspnea, palpitations, dizziness, syncope, recent weight gain or leg swelling. His mother  from MI at age 52.      All labs, EKG's, diagnostic testing and images as well as cardiac cath, stress testing   were reviewed during this encounter\"      Subjective (Events in last 24 hours):   Pt awake, alert. NAD. Denies CP, SOB. D/w patient about what the next steps are if stress is negative or  Positive and patient expressed understanding. D/w patient that it would be likely be a good idea to continue f/u yearly given family hx of MI death at a young age. Patient expressed understanding. Objective:   /65   Pulse 59   Temp 97.6 °F (36.4 °C) (Oral)   Resp 17   Ht 5' 9\" (1.753 m)   Wt 205 lb (93 kg)   SpO2 97%   BMI 30.27 kg/m²      vss  TELEMETRY: nsr    Physical Exam:  General Appearance: alert and oriented to person, place and time, in no acute distress  Cardiovascular: normal rate, regular rhythm, normal S1 and S2, no murmurs, rubs, clicks, or gallops, distal pulses intact, no carotid bruits, no JVD  Pulmonary/Chest: clear to auscultation bilaterally- no wheezes, rales or rhonchi, normal air movement, no respiratory distress  Abdomen: soft, non-tender, non-distended, normal bowel sounds, no masses   Extremities: no cyanosis, clubbing or edema, pulse   Skin: warm and dry, no rash or erythema  Head: normocephalic and atraumatic  Eyes: pupils equal, round, and reactive to light  Neck: supple and non-tender without mass, no thyromegaly   Musculoskeletal: normal range of motion, no joint swelling, deformity or tenderness  Neurological: alert, oriented, normal speech, no focal findings or movement disorder noted    Medications:    escitalopram  20 mg Oral Daily    folic acid  5 mg Oral Daily    pantoprazole  40 mg Oral QAM AC    nicotine  1 patch Transdermal Daily    ipratropium-albuterol  1 ampule Inhalation Q4H WA    enoxaparin  40 mg Subcutaneous Q24H    lisinopril  20 mg Oral Daily    And    hydroCHLOROthiazide  12.5 mg Oral Daily       guaiFENesin-dextromethorphan, 10 mL, Q4H PRN  bisacodyl, 10 mg, PRN  ondansetron, 4 mg, Q6H PRN  acetaminophen, 650 mg, Q4H PRN  potassium chloride, 40 mEq, PRN   Or  potassium alternative oral replacement, 40 mEq, PRN   Or  potassium chloride, 10 mEq, PRN  potassium chloride, 10 mEq, PRN        Diagnostics:  EKG:   Normal sinus rhythm  Normal ECG  When compared with ECG of 05-JUL-2021 21:06,  No significant change was found  Confirmed by Jose Luis Leiva MD [id number]) on 7/6/2021 10:56:24 PM  Echo:   Electronically signed by Hema Steel MD (Interpreting   physician) on 07/06/2021 at 06:06 PM   ----------------------------------------------------------------      Findings      Mitral Valve   The mitral valve structure was normal with normal leaflet separation. DOPPLER: The transmitral velocity was within the normal range with no evidence for mitral stenosis. Trace mitral regurgitation is present. Aortic Valve   The aortic valve was trileaflet with normal thickness and cuspal   separation. DOPPLER: Transaortic velocity was within the normal range with no evidence of aortic stenosis. There was no evidence of aortic regurgitation. Tricuspid Valve   The tricuspid valve structure was normal with normal leaflet separation. DOPPLER: There was no evidence of tricuspid stenosis. Trivial tricuspid regurgitation visualized. Pulmonic Valve   The pulmonic valve leaflets exhibited normal thickness, no calcification, and normal cuspal separation. DOPPLER: The transpulmonic velocity was within the normal range with no evidence for regurgitation. Left Atrium   Left atrial size was normal.      Left Ventricle   Normal left ventricle size and systolic function. Ejection fraction was estimated at 55 to 60 %. There were no regional left ventricular wall motion abnormalities and wall thickness was within normal limits. Right Atrium   Right atrial size was normal.      Right Ventricle   The right ventricular size was normal with normal systolic function and wall thickness. Pericardial Effusion   The pericardium was normal in appearance with no evidence of a pericardial effusion. Pleural Effusion   No evidence of pleural effusion. Aorta / Great Vessels   -Aortic root dimension within normal limits.   -The Pulmonary artery is within normal limits. -IVC size is within normal limits with normal respiratory phasic changes.   Stress:     Left Heart Cath:     Lab Data:    Cardiac Enzymes:  No results for input(s): CKTOTAL, CKMB, Kevon Sanchez in the last 72 hours. CBC:   Lab Results   Component Value Date    WBC 10.3 2021    RBC 5.37 2021    HGB 17.1 2021    HCT 51.7 2021     2021       CMP:    Lab Results   Component Value Date     2021    K 3.8 2021     2021    CO2 23 2021    BUN 10 2021    CREATININE 0.8 2021    LABGLOM >90 2021    GLUCOSE 84 2021    CALCIUM 9.2 2021       Hepatic Function Panel:    Lab Results   Component Value Date    ALKPHOS 97 2021    ALT 25 2021    AST 16 2021    PROT 7.2 2021    BILITOT 0.6 2021    BILIDIR <0.2 2018    LABALBU 4.3 2021       Magnesium:  No results found for: MG    PT/INR:  No results found for: PROTIME, INR    HgBA1c:    Lab Results   Component Value Date    LABA1C 5.2 2019       FLP:    Lab Results   Component Value Date    TRIG 77 2020    HDL 31 2020    LDLCALC 112 2020       TSH:    Lab Results   Component Value Date    TSH 1.420 2020         Assessment:  Chest pain--resolved   HTN-well controlled at present. GERD--taking protonix   Family hx of CAD--mother  from MI at 52     Plan:  Taking lisinopril 20 mg daily and HCTZ 12.5 mg daily. ST today. If ST WNL/negative for ischemia, cardiology will see PRN/patient can be d/c. If ST abnormal, will reassess. Patient can f/u with Dr Chetna Montague office in 2-3 months.       Electronically signed by Belen Goldmann, PA-C on 2021 at 9:54 AM

## 2021-07-07 NOTE — CARE COORDINATION
7/7/21, 2:34 PM EDT    Patient goals/plan/ treatment preferences discussed by  and . Patient goals/plan/ treatment preferences reviewed with patient/ family. Patient/ family verbalize understanding of discharge plan and are in agreement with goal/plan/treatment preferences. Understanding was demonstrated using the teach back method. AVS provided by RN at time of discharge, which includes all necessary medical information pertaining to the patients current course of illness, treatment, post-discharge goals of care, and treatment preferences. Stress Test completed without evidence of ischemia. Potential discharge home today with father as prior to admission. No discharge needs voiced.

## 2021-07-07 NOTE — CONSULTS
[facility] [address] [place] [address]  Cardiology Consult      Patient:  Zaid Schrader  YOB: 1985    MRN: [id number]   Acct: [de-identified]     Primary Care Physician: Freddy Aguilera DO    REASON FOR CONSULT:    chest pain w/ TWI in Leads III and aVF- evaluate for an in patient stress test.     CHIEF COMPLAINT:    Chest pain     HISTORY OF PRESENT ILLNESS:    Zaid Schrader is a pleasant 38year old male patient with past medical history that includes:   Past Medical History:   Diagnosis Date    Asthma     Dependence on nicotine from chewing tobacco 2015    Eczema 2015    GERD (gastroesophageal reflux disease)     History of asthma     Hypertension 2014    Irritable bowel syndrome with diarrhea     Major depression     Migraine headache     NAFLD (nonalcoholic fatty liver disease)     Obesity (BMI 30-39. 9)     Rheumatoid arthritis ([facility] [address]     sees Dr Hussein Ellis S/P robotic assisted LEFT inguinal hernia repair 2020    Smoker    The patient was admitted to the hospital for CP. For the past 2 days, he had intermittent episodes of left sided, non-exertional, non-radiating chest pain up to 6/10. He also report back pain. He reports associated SOB with CP. Stress test two years ago was negative for ischemia. Patient denies orthopnea, paroxysmal nocturnal dyspnea, palpitations, dizziness, syncope, recent weight gain or leg swelling. His mother  from MI at age 52.      All labs, EKG's, diagnostic testing and images as well as cardiac cath, stress testing   were reviewed during this encounter    CARDIAC TESTING  Echo:   ECHO: Results for orders placed during the hospital encounter of 21    ECHO Complete 2D W Doppler W Color    Narrative  Transthoracic Echocardiography Report (TTE)    Demographics    Patient Name    Daxa Valverde  Gender               Male  E    MR #            [id number]      Race                     Ethnicity    Account # [id number]      Room Number          0004    Accession       [id number]     Date of Study        07/06/2021  Number    Date of Birth   1985     Referring Physician  Arsalan MORALES DO    Age             38 year(s)     Sonographer          Lizbeth Da Silva, [facility],  T    Interpreting         Echo reader of the  Physician            merlin Clancy MD    Procedure    Type of Study    TTE procedure:ECHOCARDIOGRAM COMPLETE 2D W DOPPLER W COLOR. Procedure Date  Date: 07/06/2021 Start: 08:30 AM    Study Location: Bedside  Technical Quality: Adequate visualization    Indications:Shortness of breath and Chest pain. Additional Medical History:Chest pain, Asthma, Hypertension, GERD,  Rheumatoid arthritis, Smoker, Shortness of breath. Patient Status: Routine    Height: 69 inches Weight: 205 pounds BSA: 2.09 m^2 BMI: 30.27 kg/m^2    BP: 111/73 mmHg    Conclusions    Summary  Normal left ventricle size and systolic function. Ejection fraction was  estimated at 55 to 60 %. There were no regional left ventricular wall  motion abnormalities and wall thickness was within normal limits. Signature    ----------------------------------------------------------------  Electronically signed by Rika Clancy MD (Interpreting  physician) on 07/06/2021 at 06:06 PM  ----------------------------------------------------------------    Findings    Mitral Valve  The mitral valve structure was normal with normal leaflet separation. DOPPLER: The transmitral velocity was within the normal range with no  evidence for mitral stenosis. Trace mitral regurgitation is present. Aortic Valve  The aortic valve was trileaflet with normal thickness and cuspal  separation. DOPPLER: Transaortic velocity was within the normal range with  no evidence of aortic stenosis. There was no evidence of aortic  regurgitation. Tricuspid Valve  The tricuspid valve structure was normal with normal leaflet separation.   DOPPLER: There was no evidence of tricuspid stenosis. Trivial tricuspid regurgitation visualized. Pulmonic Valve  The pulmonic valve leaflets exhibited normal thickness, no calcification,  and normal cuspal separation. DOPPLER: The transpulmonic velocity was  within the normal range with no evidence for regurgitation. Left Atrium  Left atrial size was normal.    Left Ventricle  Normal left ventricle size and systolic function. Ejection fraction was  estimated at 55 to 60 %. There were no regional left ventricular wall  motion abnormalities and wall thickness was within normal limits. Right Atrium  Right atrial size was normal.    Right Ventricle  The right ventricular size was normal with normal systolic function and  wall thickness. Pericardial Effusion  The pericardium was normal in appearance with no evidence of a pericardial  effusion. Pleural Effusion  No evidence of pleural effusion. Aorta / Great Vessels  -Aortic root dimension within normal limits.  -The Pulmonary artery is within normal limits. -IVC size is within normal limits with normal respiratory phasic changes.     M-Mode/2D Measurements & Calculations    LV Diastolic   LV Systolic Dimension:    AV Cusp Separation: 2.4 cmLA  Dimension: 5.1 3.4 cm                    Dimension: 3.3 cmAO Root  cm             LV Volume Diastolic: 943  Dimension: 3.6 cmLA Area: 12.8  LV FS:33.3 %   ml                        cm^2  LV PW          LV Volume Systolic: 70.6  Diastolic: 0.8 ml  cm             LV EDV/LV EDV Index: 124  Septum         ml/59 m^2LV ESV/LV ESV    RV Diastolic Dimension: 2.4 cm  Diastolic: 0.9 Index: 21.6 ml/23 m^2  cm             EF Calculated: 61.8 %     LA/Aorta: 0.92  Ascending Aorta: 3.4 cm  LA volume/Index: 29.2 ml /14m^2    Doppler Measurements & Calculations    MV Peak E-Wave: 75.4 cm/s  AV Peak Velocity: 113  LVOT Peak Velocity: 88.3  MV Peak A-Wave: 49.7 cm/s  cm/s                   cm/s  MV E/A Ratio: 1.52         AV Peak Gradient: 5.11 LVOT Peak Gradient: 3  MV Peak Gradient: 2.27     mmHg                   mmHg  mmHg    MV Deceleration Time: 246  msec  IVRT: 81 msec  PV Peak Velocity: 61.3  MV E' Septal Velocity: 8.9                        cm/s  cm/s                       AV DVI (Vmax):0.78     PV Peak Gradient: 1.5  MV A' Septal Velocity: 8.1                        mmHg  cm/s  MV E' Lateral Velocity:  12.6 cm/s  MV A' Lateral Velocity:  8.1 cm/s  E/E' septal: 8.47  E/E' lateral: 5.98    [id number]     Past Medical History:    Past Medical History:   Diagnosis Date    Asthma     Dependence on nicotine from chewing tobacco 6/23/2015    Eczema 6/23/2015    GERD (gastroesophageal reflux disease)     History of asthma     Hypertension 9/30/2014    Irritable bowel syndrome with diarrhea     Major depression     Migraine headache     NAFLD (nonalcoholic fatty liver disease)     Obesity (BMI 30-39. 9)     Rheumatoid arthritis [facility])     sees Dr Joe Patel S/P robotic assisted LEFT inguinal hernia repair 11/13/2020    Smoker        Past Surgical History:    Past Surgical History:   Procedure Laterality Date    APPENDECTOMY  05/02/2013    Dr. Pepper Staff COLONOSCOPY  2015    [facility] [address] [place] [address]  2018, 2019    [facility]    ENDOSCOPY, COLON, DIAGNOSTIC      FINGER SURGERY  10/17/15    left pinky finger    HERNIA REPAIR Left 11/13/2020    ROBOTIC LEFT INGUINAL HERNIA REPAIR, with mesh performed by Ramin Verdugo MD at [address] [place] [address]  05/02/2013    Dr. Santamaria Shell ENDOSCOPY  2017    [facility]        Medications Prior to Admission:    Medications Prior to Admission: escitalopram (LEXAPRO) 20 MG tablet, Take 1 tablet by mouth daily  meloxicam (MOBIC) 15 MG tablet, Take 1 tablet by mouth daily as needed for Pain  Methotrexate, PF, 12.5 MG/0.4ML SOAJ, Inject 17.5 mg into the skin once a week Usually Wed. Folic Acid 5 MG CAPS, Take 5 mg by mouth every other day   lisinopril-hydroCHLOROthiazide (PRINZIDE) 20-12.5 MG per tablet, Take 1 tablet by mouth daily  omeprazole (PRILOSEC) 20 MG delayed release capsule, Take 1 capsule by mouth daily    Allergies:    Plaquenil [hydroxychloroquine sulfate]    Social History:    reports that he has been smoking cigarettes. He has a 12.00 pack-year smoking history. He has quit using smokeless tobacco.  His smokeless tobacco use included chew. He reports that he does not drink alcohol and does not use drugs. Family History:   family history includes Early Death in his father; Heart Disease (age of onset: 52) in his mother; High Blood Pressure in an other family member; Prostate Cancer in his paternal grandfather. REVIEW OF SYSTEMS:  Constitutional: negative for anorexia, chills and fevers,weight change  Skin: negative for new skin rash per patient  HEENT: negative for head trauma or new visual changes  Respiratory: negative for cough, hemoptysis, wheezing  Cardiovascular: negative for  orthopnea, palpitations and syncope. Gastrointestinal: negative for abdominal pain,nausea , vomiting, constipation, diarrhea.   Hematologic/lymphatic: negative for bruising,prolonged bleeding,blood clots  Musculoskeletal:negative for muscle weakness, myalgias,wasting  Neurological: negative for coordination problems, dizziness, gait problems and vertigo  Behavioral/Psych:negative for mood/sleep disturbance      PHYSICAL EXAM:   Vitals:  Patient Vitals for the past 24 hrs:   BP Temp Temp src Pulse Resp SpO2   07/06/21 1716 -- -- -- -- 22 --   07/06/21 1532 (!) 102/55 98.5 °F (36.9 °C) Oral -- -- 94 %   07/06/21 1319 -- -- -- -- 20 --   07/06/21 1127 130/80 98.1 °F (36.7 °C) Oral 72 16 98 %   07/06/21 0932 -- -- -- -- 14 98 %   07/06/21 0800 125/81 97.9 °F (36.6 °C) Oral -- -- 96 %   07/06/21 0359 111/73 97.8 °F (36.6 °C) Oral 65 16 98 %   07/05/21 2231 -- -- -- -- -- 100 %       Last 3 weights: Wt Readings from Last 3 Encounters:   07/05/21 205 lb (93 kg)   12/18/20 213 lb 9.6 oz (96.9 kg)   11/30/20 213 lb (96.6 kg)     24 hour intake/output:    Intake/Output Summary (Last 24 hours) at 7/6/2021 2046  Last data filed at 7/6/2021 2003  Gross per 24 hour   Intake 200 ml   Output 500 ml   Net -300 ml     BMI:Body mass index is 30.27 kg/m². General Appearance: alert and oriented to person, place and time, well developed and well- nourished, in no acute distress  Skin: warm and dry, no rash or erythema  Eyes: pupils equal, round, and reactive to light, extraocular eye movements intact, conjunctivae normal  Neck: supple and non-tender without mass, no thyromegaly or thyroid nodules, no cervical lymphadenopathy  Pulmonary/Chest: clear to auscultation bilaterally- no wheezes, rales or rhonchi, normal air movement, no respiratory distress  Cardiovascular: normal rate, regular rhythm, normal S1 and S2, no murmur. No rubs, clicks, or gallops, distal pulses intact, no carotid bruits, Negative JVD  Radial Pulses: intact 2+  Abdomen: soft, non-tender, non-distended, normal bowel sounds, no masses or organomegaly  Extremities: no cyanosis, clubbing .  no Edema  Musculoskeletal: normal range of motion, no joint swelling, deformity or tenderness      RADIOLOGY   ECHO Complete 2D W Doppler W Color    Result Date: 7/6/2021  Transthoracic Echocardiography Report (TTE)  Demographics   Patient Name    Clay Mark  Gender               Male                  E   MR #            [id number]      Race                                                   Ethnicity   Account #       [de-identified]      Room Number          0004   Accession       [id number]     Date of Study        07/06/2021  Number   Date of Birth   1985     Referring Physician  Ermelinda Le MD Lia MORALES DO   Age             38 year(s)     Sonographer          Julio Westbrook, [facility],                                                      T                                  Interpreting         Echo reader of the                                 Physician            week                                                      Ming Lee MD  Procedure Type of Study   TTE procedure:ECHOCARDIOGRAM COMPLETE 2D W DOPPLER W COLOR. Procedure Date Date: 07/06/2021 Start: 08:30 AM Study Location: Bedside Technical Quality: Adequate visualization Indications:Shortness of breath and Chest pain. Additional Medical History:Chest pain, Asthma, Hypertension, GERD, Rheumatoid arthritis, Smoker, Shortness of breath. Patient Status: Routine Height: 69 inches Weight: 205 pounds BSA: 2.09 m^2 BMI: 30.27 kg/m^2 BP: 111/73 mmHg  Conclusions   Summary  Normal left ventricle size and systolic function. Ejection fraction was  estimated at 55 to 60 %. There were no regional left ventricular wall  motion abnormalities and wall thickness was within normal limits. Signature   ----------------------------------------------------------------  Electronically signed by Ming Lee MD (Interpreting  physician) on 07/06/2021 at 06:06 PM  ----------------------------------------------------------------   Findings   Mitral Valve  The mitral valve structure was normal with normal leaflet separation. DOPPLER: The transmitral velocity was within the normal range with no  evidence for mitral stenosis. Trace mitral regurgitation is present. Aortic Valve  The aortic valve was trileaflet with normal thickness and cuspal  separation. DOPPLER: Transaortic velocity was within the normal range with  no evidence of aortic stenosis. There was no evidence of aortic  regurgitation. Tricuspid Valve  The tricuspid valve structure was normal with normal leaflet separation. DOPPLER: There was no evidence of tricuspid stenosis.   Trivial tricuspid regurgitation visualized. Pulmonic Valve  The pulmonic valve leaflets exhibited normal thickness, no calcification,  and normal cuspal separation. DOPPLER: The transpulmonic velocity was  within the normal range with no evidence for regurgitation. Left Atrium  Left atrial size was normal.   Left Ventricle  Normal left ventricle size and systolic function. Ejection fraction was  estimated at 55 to 60 %. There were no regional left ventricular wall  motion abnormalities and wall thickness was within normal limits. Right Atrium  Right atrial size was normal.   Right Ventricle  The right ventricular size was normal with normal systolic function and  wall thickness. Pericardial Effusion  The pericardium was normal in appearance with no evidence of a pericardial  effusion. Pleural Effusion  No evidence of pleural effusion. Aorta / Great Vessels  -Aortic root dimension within normal limits.  -The Pulmonary artery is within normal limits. -IVC size is within normal limits with normal respiratory phasic changes.   M-Mode/2D Measurements & Calculations   LV Diastolic   LV Systolic Dimension:    AV Cusp Separation: 2.4 cmLA  Dimension: 5.1 3.4 cm                    Dimension: 3.3 cmAO Root  cm             LV Volume Diastolic: 283  Dimension: 3.6 cmLA Area: 12.8  LV FS:33.3 %   ml                        cm^2  LV PW          LV Volume Systolic: 68.4  Diastolic: 0.8 ml  cm             LV EDV/LV EDV Index: 124  Septum         ml/59 m^2LV ESV/LV ESV    RV Diastolic Dimension: 2.4 cm  Diastolic: 0.9 Index: 58.2 ml/23 m^2  cm             EF Calculated: 61.8 %     LA/Aorta: 0.92                                           Ascending Aorta: 3.4 cm                                           LA volume/Index: 29.2 ml /14m^2  Doppler Measurements & Calculations   MV Peak E-Wave: 75.4 cm/s  AV Peak Velocity: 113  LVOT Peak Velocity: 88.3  MV Peak A-Wave: 49.7 cm/s  cm/s                   cm/s  MV E/A Ratio: 1.52         AV Peak Gradient: 5.11 LVOT Peak Gradient: 3  MV Peak Gradient: 2.27     mmHg                   mmHg  mmHg   MV Deceleration Time: 246  msec                             IVRT: 81 msec                                                    PV Peak Velocity: 61.3  MV E' Septal Velocity: 8.9                        cm/s  cm/s                       AV DVI (Vmax):0.78     PV Peak Gradient: 1.5  MV A' Septal Velocity: 8.1                        mmHg  cm/s  MV E' Lateral Velocity:  12.6 cm/s  MV A' Lateral Velocity:  8.1 cm/s  E/E' septal: 8.47  E/E' lateral: 5.98  [email or web address] [id number]    XR CHEST PORTABLE    Result Date: 7/5/2021  PROCEDURE: XR CHEST PORTABLE CLINICAL INFORMATION: cp. COMPARISON: Chest x-ray dated 18 May 2020. TECHNIQUE: AP upright view of the chest. FINDINGS: The heart size is normal.The mediastinum is not widened. There are no pulmonary infiltrates or effusions. The pulmonary vascularity is normal. No suspicious osseous lesions are present. No interval change since previous study dated 18 May 2020, no acute cardiopulmonary disease. . **This report has been created using voice recognition software. It may contain minor errors which are inherent in voice recognition technology. ** Final report electronically signed by DR Julio Bran on 7/5/2021 12:05 PM      LABS:  Recent Labs     07/05/21  1615 07/05/21  2138 07/06/21  0340   TROPONINT < 0.010 < 0.010 < 0.010     CBC:   Lab Results   Component Value Date    WBC 10.3 07/05/2021    RBC 5.37 07/05/2021    HGB 17.1 07/05/2021    HCT 51.7 07/05/2021    MCV 96.3 07/05/2021    MCH 31.8 07/05/2021    MCHC 33.1 07/05/2021    RDW 13.1 02/04/2018     07/05/2021    MPV 10.2 07/05/2021     BMP:    Lab Results   Component Value Date     07/06/2021    K 3.8 07/06/2021     07/06/2021    CO2 23 07/06/2021    BUN 10 07/06/2021    LABALBU 4.3 2021    CREATININE 0.8 2021    CALCIUM 9.2 2021    LABGLOM >90 2021    GLUCOSE 84 2021     Hepatic Function Panel:    Lab Results   Component Value Date    ALKPHOS 97 2021    ALT 25 2021    AST 16 2021    PROT 7.2 2021    BILITOT 0.6 2021    BILIDIR <0.2 2018    LABALBU 4.3 2021     Magnesium:  No results found for: MG  Warfarin PT/INR:  No components found for: PTPATWAR, PTINRWAR  HgBA1c:    Lab Results   Component Value Date    LABA1C 5.2 2019     FLP:    Lab Results   Component Value Date    TRIG 77 2020    HDL 31 2020    LDLCALC 112 2020     TSH:    Lab Results   Component Value Date    TSH 1.420 2020     BNP: No results found for: BNP      ASSESSMENT:  Tucker Marsh is a pleasant 38year old male patient with past medical history that includes:   Past Medical History:   Diagnosis Date    Asthma     Dependence on nicotine from chewing tobacco 2015    Eczema 2015    GERD (gastroesophageal reflux disease)     History of asthma     Hypertension 2014    Irritable bowel syndrome with diarrhea     Major depression     Migraine headache     NAFLD (nonalcoholic fatty liver disease)     Obesity (BMI 30-39. 9)     Rheumatoid arthritis ([facility] [address]     sees Dr Billy Acosta S/P robotic assisted LEFT inguinal hernia repair 2020    Smoker    The patient was admitted to the hospital for CP. For the past 2 days, he had intermittent episodes of left sided, non-exertional, non-radiating chest pain up to 6/10. He also report back pain. He reports associated SOB with CP. Stress test two years ago was negative for ischemia. Patient denies orthopnea, paroxysmal nocturnal dyspnea, palpitations, dizziness, syncope, recent weight gain or leg swelling. His mother  from MI at age 52.     3. Chest pain  2. Hypertension  3. Abnormal EKG  4. GERD  5. FH of premature CAD  6.  His mother  from MI at age 52    RECOMMENDATIONS:   No WMA on Echo, preserved EF   EKG revealed SR, TWI in inferior leads    D-dimer is negative    Troponin is negative   Has risk factors for CAD   CP now resolved    Keep NPO post midnight    Based on patient's risk factors and clinical presentation, stress test is indicated to investigate for possible underlying ischemic heart disease. Patient  agrees with that work up and its risks and benefits and potential need for additional testing if stress test is abnormal such as cardiac catheterization    Above findings and plan of care were discussed with patient, questions were answered, agreeable to plan. Thank you for allowing me to participate in the care of this patient. Please let me know if I can be of any further assistance.       Bairon King MD, Shakir Gordon   8:46 PM  7/6/2021

## 2021-07-07 NOTE — PLAN OF CARE
Problem: Pain:  Goal: Pain level will decrease  Description: Pain level will decrease  7/6/2021 2141 by Lee Duarte RN  Outcome: Ongoing  Note: Pt complains of pain at a 2/10. Non pharmacological interventions completed which include repositioning, and rest. Pt states pain goal at a 0/10. Pain assessed every 4 hours, and as needed. PRN pain medications given as ordered. Problem: Tissue Perfusion - Cardiopulmonary, Altered:  Goal: Absence of angina  Description: Absence of angina  7/6/2021 2141 by Lee Duarte RN  Outcome: Ongoing  Note: Pt continues to have chest pain, rating it at a 2/10 mid right side of chest that does not radiate. Stress test scheduled for tomorrow. Problem: Discharge Planning:  Goal: Discharged to appropriate level of care  Description: Discharged to appropriate level of care  7/6/2021 2141 by Lee Duarte RN  Outcome: Ongoing  Note: Pt plans to be discharged home when appropriate. Care plan reviewed with patient. Patient verbalize understanding of the plan of care and contribute to goal setting.

## 2021-07-08 ENCOUNTER — TELEPHONE (OUTPATIENT)
Dept: FAMILY MEDICINE CLINIC | Age: 36
End: 2021-07-08

## 2021-07-09 NOTE — TELEPHONE ENCOUNTER
[facility] Transitions Initial Follow Up Call    Outreach made within 2 business days of discharge: Yes    Patient: Tucker Marsh Patient : 1985   MRN: [id number]  Reason for Admission: There are no discharge diagnoses documented for the most recent discharge. Discharge Date: 21       Spoke with: Bel Sender    Discharge department/facility: [facility]      TCM Interactive Patient Contact:  Was patient able to fill all prescriptions: None given    Was patient instructed to bring all medications to the follow-up visit: Yes  Is patient taking all medications as directed in the discharge summary?  Yes  Does patient understand their discharge instructions: Yes  Does patient have questions or concerns that need addressed prior to 7-14 day follow up office visit: none    Scheduled appointment with PCP within 7-14 days  Pt aware of appt with Dr Indu Romano 21  Follow Up  Future Appointments   Date Time Provider Tylor Reyes   2021  3:00 PM [facility] Heimlich[facility]   2021  8:20 AM Loreda Heimlich, [address] [place] [address]   10/11/2021  9:30 AM Candy Hobbs MD N [facility] - Felicia Ackerman LPN

## 2021-07-09 NOTE — TELEPHONE ENCOUNTER
[facility] Transitions Initial Follow Up Call    Outreach made within 2 business days of discharge: Yes    Patient: Srini Cook Patient : 1985   MRN: [id number]  Reason for Admission: There are no discharge diagnoses documented for the most recent discharge. Discharge Date: 21       Spoke with: Merged with [facility] requesting pt to call back at earliest convenience.        Discharge department/facility: [facility]      Scheduled appointment with PCP within 7-14 days    Follow Up  Future Appointments   Date Time Provider Tylor Reyes   2021  3:00 PM [facility] [place]   2021  8:20 AM [facility] [place] [address]   10/11/2021  9:30 AM [facility] [address]

## 2021-07-13 NOTE — PROGRESS NOTES
Chief Complaint   Patient presents with    Follow-Up from [facility] - Chest pain - pain and  sob  continues as before     Other     Discuss ECHO results     Fatigue    Depression     doing better       History obtained from the patient. SUBJECTIVE:  Reinaldo Griffin is a 38 y.o. male that presents today for     Patient admitted to [facility] from 7/5 to 7/7 for issues as noted below. D/c summary: \"Hospital Course:      Reinaldo Griffin is a 38 y.o. male admitted to [facility] on 7/5/2021 for atypical chest pain.      Acute coronary syndrome ruled out by negative enzymes, EKG and inpatient cardiac stress test.        Stable for home discharge today. \"      Since discharge:  Doing about the same  con't to have constant chest pain  A little more with exertion   Otherwise constant  From of chest, acey  Not sharp  No changes with position    Also c/o TUCKER that persists  This started a few wks before the CP  No SOB at rest  Gets winded on exertion  Hx of asthma, occ wheeze  No cough    Had stress and echo during admission, both WNL  cxr clear  D-dimer neg  No evidence for pericarditis or myocarditis. Also c/w persistent fatigue the last few months  Snores a lot  Unclear if stops breathing at night  +STOP BANG    BP <140/90 since d/c      -Depressed Mood PRIOR VISIT:    HPI:  Been feeling depressed for the last year  Never dx before or treated  Has occ vague thoughts of not being around  No active SI  No HI    Depressed Mood? yes -   Anhedonia? yes -   Appetite changes? yes -   Sleep disturbances? yes -   Feelings of guilt? yes -   Decreased energy? yes -   Impaired concentration?  yes -   Substance abuse? no      UPDATE LAST VISIT:   Lost to f/u on this  N/s on 29 JAN 2021  On 10 mg lexapro  Working well overall, still feeling some depression  No side effects  No SI/HI  Asking if can inc dose of lexapro     UPDATE TODAY:   Moods improved on inc dose of lexapro  Working well  No side daily 90 capsule 3     No current facility-administered medications for this visit. Orders Placed This Encounter   Medications    lisinopril-hydroCHLOROthiazide (PRINZIDE) 20-12.5 MG per tablet     Sig: Take 1 tablet by mouth daily     Dispense:  90 tablet     Refill:  3    predniSONE (DELTASONE) 10 MG tablet     Si tabs PO ONCE Daily x 4 days, then 2 tabs PO ONCE daily x 4 days, then 1 TAB PO ONCE daily x 4 days, then 1/2 TAB PO ONCE daily x 4 days     Dispense:  30 tablet     Refill:  0    albuterol sulfate HFA (VENTOLIN HFA) 108 (90 Base) MCG/ACT inhaler     Sig: Inhale 2 puffs into the lungs every 4 hours as needed for Wheezing or Shortness of Breath     Dispense:  1 Inhaler     Refill:  3         All medications reviewed and reconciled, including OTC and herbal medications. Updated list given to patient. Patient Active Problem List   Diagnosis    History of asthma    Migraine headache    Hypertension    Dependence on nicotine from chewing tobacco    Eczema    GERD (gastroesophageal reflux disease)    Irritable bowel syndrome with diarrhea    NAFLD (nonalcoholic fatty liver disease)    Rheumatoid arthritis (HCC)    Cervicalgia    Chronic bilateral low back pain without sciatica    Obesity (BMI 30-39. 9)    S/P robotic assisted LEFT inguinal hernia repair    Major depression    Atypical chest pain    Dependence on nicotine from cigarettes    TUCKER (dyspnea on exertion)    Fatigue    Sleep disturbance       Past Medical History:   Diagnosis Date    Dependence on nicotine from chewing tobacco 2015    Dependence on nicotine from cigarettes     Eczema 2015    GERD (gastroesophageal reflux disease)     History of asthma     Hypertension 2014    Irritable bowel syndrome with diarrhea     Major depression     Migraine headache     NAFLD (nonalcoholic fatty liver disease)     Obesity (BMI 30-39. 9)     Rheumatoid arthritis ([facility] [address]     sees Dr Sara Liriano S/P robotic assisted LEFT inguinal hernia repair 11/13/2020       Past Surgical History:   Procedure Laterality Date    APPENDECTOMY  05/02/2013    Dr. Lea Montalvo COLONOSCOPY  2015    [facility]  2018, 2019    [facility]    ENDOSCOPY, COLON, DIAGNOSTIC      FINGER SURGERY  10/17/15    left pinky finger    HERNIA REPAIR Left 11/13/2020    ROBOTIC LEFT INGUINAL HERNIA REPAIR, with mesh performed by Lisette Rivera MD at [address]  05/02/2013    Dr. Alcira Blake ENDOSCOPY  2017    [facility] Allergies   Allergen Reactions    Plaquenil [Hydroxychloroquine Sulfate] Nausea And Vomiting         Social History     Tobacco Use    Smoking status: Current Every Day Smoker     Packs/day: 1.00     Years: 12.00     Pack years: 12.00     Types: Cigarettes    Smokeless tobacco: Former User     Types: Chew   Substance Use Topics    Alcohol use: No     Comment: rare       Family History   Problem Relation Age of Onset    Heart Disease Mother 52        MI    Early Death Father         MVA    Prostate Cancer Paternal Grandfather         unsure age   [de-identified] High Blood Pressure Other         Pt unsure if anyone in family does.  Colon Cancer Neg Hx          I have reviewed the patient's past medical history, past surgical history, allergies, medications, social and family history and I have made updates where appropriate.       Review of Systems  Positive responses are highlighted in bold    Constitutional:  Fever, Chills, Night Sweats, Fatigue, Unexpected changes in weight  HENT:  Ear pain, Tinnitus, Nosebleeds, Trouble swallowing, Hearing loss, Sore throat  Cardiovascular:  Chest Pain, Palpitations, Orthopnea, Paroxysmal Nocturnal Dyspnea  Respiratory:  Cough, Wheezing, Shortness of breath, Chest tightness, Apnea  Gastrointestinal:  Nausea, Vomiting, Diarrhea, Constipation, Heartburn, Blood in stool  Genitourinary:  Difficulty or painful urination, Flank pain, Change in frequency, Urgency  Skin:  Color change, Rash, Itching, Wound  Musculoskeletal:  Joint pain, Back pain, Gait problems, Joint swelling, Myalgias  Neurological:  Dizziness, Headaches, Presyncope, Numbness, Seizures, Tremors  Endocrine:  Heat Intolerance, Cold Intolerance, Polydipsia, Polyphagia, Polyuria      PHYSICAL EXAM:  Vitals:    07/14/21 1015 07/14/21 1154   BP: (!) 148/72 134/72   Pulse: 83    Resp: 12    Temp: 97.8 °F (36.6 °C)    TempSrc: Oral    SpO2: 98%    Weight: 199 lb (90.3 kg)    Height: 5' 9.5\" (1.765 m)      Body mass index is 28.97 kg/m². Pain Score:   3 (chest)    VS Reviewed  General Appearance: A&O x 3, No acute distress,well developed and well- nourished  Eyes: pupils equal, round, and reactive to light, extraocular eye movements intact, conjunctivae and eye lids without erythema  ENT: external ear and ear canal clear bilaterally, TMs intact and regular, nose without deformity, nasal mucosa and turbinates normal without polyps, oropharynx normal, dentition is normal for age  Neck: supple and non-tender without mass, no thyromegaly or thyroid nodules, no cervical lymphadenopathy  Pulmonary/Chest: clear to auscultation bilaterally- no wheezes, rales or rhonchi, normal air movement, no respiratory distress or retractions. + TTP along anterior chest along costochondral junction. Cardiovascular: S1 and S2 auscultated w/ RRR. No murmurs, rubs, clicks, or gallops, distal pulses intact. Abdomen: soft, non-tender, non-distended, bowel sounds physiologic,  no rebound or guarding, no masses or hernias noted. Liver and spleen without enlargement. Extremities: no cyanosis, clubbing or edema of the lower extremities.    Skin: warm and dry, no rash or erythema      Lab Results   Component Value Date    WBC 10.3 07/05/2021    HGB 17.1 07/05/2021    HCT 51.7 07/05/2021    MCV 96.3 (H) 07/05/2021     07/05/2021     Lab Results   Component Value Date     07/06/2021    K 3.8 07/06/2021     07/06/2021    CO2 23 07/06/2021    BUN 10 07/06/2021    CREATININE 0.8 07/06/2021    GLUCOSE 84 07/06/2021    CALCIUM 9.2 07/06/2021     Lab Results   Component Value Date    DDIMER < 215.00 07/05/2021        Narrative   PROCEDURE: XR CHEST PORTABLE       CLINICAL INFORMATION: cp.       COMPARISON: Chest x-ray dated 18 May 2020.       TECHNIQUE: AP upright view of the chest.       FINDINGS:           The heart size is normal.The mediastinum is not widened.  There are no pulmonary infiltrates or effusions. The pulmonary vascularity is normal.   No suspicious osseous lesions are present.           Impression   No interval change since previous study dated 18 May 2020, no acute cardiopulmonary disease. .                   **This report has been created using voice recognition software. It may contain minor errors which are inherent in voice recognition technology. **       Final report electronically signed by DR Sarah Núñez on 7/5/2021 12:05 PM       Lexiscan Stress Test 07 July 2021   Summary   There is moderate attenuation artifact noted in the inferior wall seems to   be related to bowel artifact. The nuclear images is not suggestive for myocardial ischemia. Recommendation   Medical management-no intervention. Clinical correlation is recommended. ECHO 06 July 2021   Summary   Normal left ventricle size and systolic function. Ejection fraction was   estimated at 55 to 60 %. There were no regional left ventricular wall   motion abnormalities and wall thickness was within normal limits. ASSESSMENT & PLAN  1. Atypical chest pain    Chest pain persists  Unclear etiology    EKG, stress test and echo all appropriate  cxr neg as well  D-dimer neg  No clinical or diagnostic evidence for pericarditis. May be costochondritis. Also not clear what's driving the TUCKER at this point as w/u thus far unremarkable.      Plan:  Do pred taper to treat possible

## 2021-07-14 ENCOUNTER — OFFICE VISIT (OUTPATIENT)
Dept: FAMILY MEDICINE CLINIC | Age: 36
End: 2021-07-14
Payer: COMMERCIAL

## 2021-07-14 VITALS
TEMPERATURE: 97.8 F | BODY MASS INDEX: 28.49 KG/M2 | DIASTOLIC BLOOD PRESSURE: 72 MMHG | HEART RATE: 83 BPM | WEIGHT: 199 LBS | OXYGEN SATURATION: 98 % | HEIGHT: 70 IN | SYSTOLIC BLOOD PRESSURE: 134 MMHG | RESPIRATION RATE: 12 BRPM

## 2021-07-14 DIAGNOSIS — R06.09 DOE (DYSPNEA ON EXERTION): ICD-10-CM

## 2021-07-14 DIAGNOSIS — R53.83 FATIGUE, UNSPECIFIED TYPE: ICD-10-CM

## 2021-07-14 DIAGNOSIS — F32.5 MAJOR DEPRESSIVE DISORDER WITH SINGLE EPISODE, IN FULL REMISSION (HCC): ICD-10-CM

## 2021-07-14 DIAGNOSIS — Z87.09 HISTORY OF ASTHMA: ICD-10-CM

## 2021-07-14 DIAGNOSIS — F17.210 CIGARETTE NICOTINE DEPENDENCE WITHOUT COMPLICATION: ICD-10-CM

## 2021-07-14 DIAGNOSIS — R07.89 ATYPICAL CHEST PAIN: Primary | ICD-10-CM

## 2021-07-14 DIAGNOSIS — I10 ESSENTIAL HYPERTENSION: ICD-10-CM

## 2021-07-14 DIAGNOSIS — G47.9 SLEEP DISTURBANCE: ICD-10-CM

## 2021-07-14 DIAGNOSIS — F17.220 CHEWING TOBACCO NICOTINE DEPENDENCE WITHOUT COMPLICATION: ICD-10-CM

## 2021-07-14 PROCEDURE — 99495 TRANSJ CARE MGMT MOD F2F 14D: CPT | Performed by: FAMILY MEDICINE

## 2021-07-14 PROCEDURE — 1111F DSCHRG MED/CURRENT MED MERGE: CPT | Performed by: FAMILY MEDICINE

## 2021-07-14 RX ORDER — PREDNISONE 10 MG/1
TABLET ORAL
Qty: 30 TABLET | Refills: 0 | Status: SHIPPED | OUTPATIENT
Start: 2021-07-14 | End: 2021-07-30

## 2021-07-14 RX ORDER — ALBUTEROL SULFATE 90 UG/1
2 AEROSOL, METERED RESPIRATORY (INHALATION) EVERY 4 HOURS PRN
Qty: 1 INHALER | Refills: 3 | Status: SHIPPED | OUTPATIENT
Start: 2021-07-14

## 2021-07-14 RX ORDER — LISINOPRIL AND HYDROCHLOROTHIAZIDE 20; 12.5 MG/1; MG/1
1 TABLET ORAL DAILY
Qty: 90 TABLET | Refills: 3 | Status: SHIPPED | OUTPATIENT
Start: 2021-07-14 | End: 2022-08-28 | Stop reason: SDUPTHER

## 2021-10-25 NOTE — TELEPHONE ENCOUNTER
Left detailed msg on mach with results. Ok per signed Juana Haley. Niacinamide Pregnancy And Lactation Text: These medications are considered safe during pregnancy.

## 2022-01-18 ENCOUNTER — HOSPITAL ENCOUNTER (EMERGENCY)
Age: 37
Discharge: HOME | End: 2022-01-18
Payer: COMMERCIAL

## 2022-01-18 VITALS
HEIGHT: 69 IN | WEIGHT: 210 LBS | RESPIRATION RATE: 20 BRPM | HEART RATE: 90 BPM | TEMPERATURE: 98 F | BODY MASS INDEX: 31.1 KG/M2 | DIASTOLIC BLOOD PRESSURE: 90 MMHG | SYSTOLIC BLOOD PRESSURE: 168 MMHG | OXYGEN SATURATION: 98 %

## 2022-01-18 DIAGNOSIS — U07.1 COVID-19 VIRUS INFECTION: Primary | ICD-10-CM

## 2022-01-18 LAB — SARS-COV-2, NAA: DETECTED

## 2022-01-18 PROCEDURE — 87635 SARS-COV-2 COVID-19 AMP PRB: CPT

## 2022-01-18 PROCEDURE — 99213 OFFICE O/P EST LOW 20 MIN: CPT

## 2022-01-18 PROCEDURE — 99213 OFFICE O/P EST LOW 20 MIN: CPT | Performed by: NURSE PRACTITIONER

## 2022-01-18 NOTE — ED PROVIDER NOTES
[address]  Urgent Care Encounter       CHIEF COMPLAINT       Chief Complaint   Patient presents with    Covid Testing    Nausea    Cough       Nurses Notes reviewed and I agree except as noted in the HPI. HISTORY OF PRESENT ILLNESS   Hank Abdul is a 39 y.o. male who presents with complaints fatigue, fever and body aches, onset yesterday. Patient also with cough nausea but no vomiting and no diarrhea. No sore throat or otalgia. He does have some nasal congestion. No known COVID exposure. He is not vaccinated for COVID. The history is provided by the patient. REVIEW OF SYSTEMS     Review of Systems   Constitutional: Positive for fatigue and fever. Negative for chills. HENT: Positive for congestion. Negative for ear pain, sinus pressure and sore throat. Respiratory: Positive for cough. Negative for shortness of breath. Cardiovascular: Negative for chest pain. Gastrointestinal: Positive for nausea. Negative for diarrhea and vomiting. Musculoskeletal: Positive for myalgias. Skin: Negative for rash. Neurological: Positive for headaches. PAST MEDICAL HISTORY         Diagnosis Date    Dependence on nicotine from chewing tobacco 06/23/2015    Dependence on nicotine from cigarettes     Eczema 06/23/2015    GERD (gastroesophageal reflux disease)     History of asthma     Hypertension 09/30/2014    Irritable bowel syndrome with diarrhea     Major depression     Migraine headache     NAFLD (nonalcoholic fatty liver disease)     Obesity (BMI 30-39. 9)     Rheumatoid arthritis ([facility]     sees Dr Louise Callahan S/P robotic assisted LEFT inguinal hernia repair 11/13/2020       SURGICALHISTORY     Patient  has a past surgical history that includes Colonoscopy (2015); laparoscopic appendectomy (05/02/2013); Appendectomy (05/02/2013); Tonsillectomy; Finger surgery (10/17/15); Upper gastrointestinal endoscopy (2017);  Dental surgery (2018, 2019); hernia repair (Left, 11/13/2020); and Endoscopy, colon, diagnostic. CURRENT MEDICATIONS       Discharge Medication List as of 1/18/2022 11:53 AM      CONTINUE these medications which have NOT CHANGED    Details   lisinopril-hydroCHLOROthiazide (PRINZIDE) 20-12.5 MG per tablet Take 1 tablet by mouth daily, Disp-90 tablet, R-3Normal      albuterol sulfate HFA (VENTOLIN HFA) 108 (90 Base) MCG/ACT inhaler Inhale 2 puffs into the lungs every 4 hours as needed for Wheezing or Shortness of Breath, Disp-1 Inhaler, R-3Normal      escitalopram (LEXAPRO) 20 MG tablet Take 1 tablet by mouth daily, Disp-90 tablet, R-1Normal      meloxicam (MOBIC) 15 MG tablet Take 1 tablet by mouth daily as needed for Pain, Disp-30 tablet, R-3Normal      Methotrexate, PF, 12.5 MG/0.4ML SOAJ Inject 17.5 mg into the skin once a week Usually Wed. Historical Med      Folic Acid 5 MG CAPS Take 5 mg by mouth every other day Historical Med      omeprazole (PRILOSEC) 20 MG delayed release capsule Take 1 capsule by mouth daily, Disp-90 capsule,R-3Normal             ALLERGIES     Patient is is allergic to plaquenil [hydroxychloroquine sulfate]. Patients   Immunization History   Administered Date(s) Administered    Tdap (Boostrix, Adacel) 10/17/2015       FAMILY HISTORY     Patient's family history includes Early Death in his father; Heart Disease (age of onset: 52) in his mother; High Blood Pressure in an other family member; Prostate Cancer in his paternal grandfather. SOCIAL HISTORY     Patient  reports that he has been smoking cigarettes. He has a 12.00 pack-year smoking history. He has quit using smokeless tobacco.  His smokeless tobacco use included chew. He reports that he does not drink alcohol and does not use drugs. PHYSICAL EXAM     ED TRIAGE VITALS  BP: (!) 168/90, Temp: 98 °F (36.7 °C), Pulse: 90, Resp: 20, SpO2: 98 %,Estimated body mass index is 31.01 kg/m² as calculated from the following:    Height as of this encounter: 5' 9\" (1.753 m). Weight as of this encounter: 210 lb (95.3 kg). ,No LMP for male patient. Physical Exam  Vitals and nursing note reviewed. Constitutional:       General: He is not in acute distress. Appearance: He is well-developed. He is not ill-appearing. HENT:      Head: Normocephalic and atraumatic. Right Ear: Tympanic membrane and ear canal normal.      Left Ear: Tympanic membrane and ear canal normal.      Nose: Congestion present. Mouth/Throat:      Lips: Pink. Mouth: Mucous membranes are moist.      Pharynx: Oropharynx is clear. Uvula midline. No posterior oropharyngeal erythema. Eyes:      General: Lids are normal. No scleral icterus. Conjunctiva/sclera: Conjunctivae normal.      Pupils: Pupils are equal.   Cardiovascular:      Rate and Rhythm: Normal rate and regular rhythm. Heart sounds: Normal heart sounds, S1 normal and S2 normal.   Pulmonary:      Effort: Pulmonary effort is normal. No respiratory distress. Breath sounds: Normal breath sounds and air entry. Musculoskeletal:      Comments: Normal active ROM x 4 extremities  Gait steady   Lymphadenopathy:      Comments: No head or neck adenopathy   Skin:     General: Skin is warm and dry. Findings: No rash (to exposed skin). Neurological:      General: No focal deficit present. Mental Status: He is alert and oriented to person, place, and time. Sensory: No sensory deficit. Comments: Answers questions readily and appropriately   Psychiatric:         Mood and Affect: Mood normal.         Speech: Speech normal.         Behavior: Behavior normal. Behavior is cooperative.          DIAGNOSTIC RESULTS     Labs:  Results for orders placed or performed during the hospital encounter of 01/18/22   COVID-19, Rapid   Result Value Ref Range    SARS-CoV-2, DALY DETECTED (AA) NOT DETECTED       IMAGING:    No orders to display         EKG:      URGENT CARE COURSE:     Vitals:    01/18/22 1105   BP: (!) 168/90   Pulse: 90 Resp: 20   Temp: 98 °F (36.7 °C)   SpO2: 98%   Weight: 210 lb (95.3 kg)   Height: 5' 9\" (1.753 m)       Medications - No data to display         PROCEDURES:  None    FINAL IMPRESSION      1. COVID-19 virus infection          DISPOSITION/ PLAN     Patient tested positive for COVID-19. Quarantine for 5days from onset if symptoms have resolved and no fever. Wear a mask for 5 days after the initial 5 days. If symptoms continue or have a fever, quarantine for 10 days from onset of symptoms. If symptoms or fever remain after 10 days, continue quarantine for total of 14 days. Can use over-the-counter medication as desired for symptom management. Can start Vitamin C, D3, and Zinc if desired. Tylenol or Motrin for body aches and fever. Imodium as needed for diarrhea. Drink plenty fluids to maintain good hydration. Wear a mask around others in the home. Good frequent handwashing. Social distancing. Can contact the health department or your family doctor with any questions or concerns. ER for any difficulty breathing or any other concerning symptoms. Further instructions were outlined verbally and in the patient's discharge instructions. All the patient's questions were answered. The patient/parent agreed with the plan and was discharged from the [facility] in good condition. Did discuss potential monoclonal antibody treatment with the patient due to his history of rheumatoid arthritis and methotrexate treatment. Patient was unsure and wanted to discuss with his wife. Patient was instructed to contact his PCP if considering antibiotic treatment for scheduling. Did discuss the emergency use authorization of the treatment as well as potential side effects with patient.     PATIENT REFERRED TO:  Mavis Orona [facility] [id number]      DISCHARGE MEDICATIONS:  Discharge Medication List as of 1/18/2022 11:53 AM          Discharge Medication List as of 1/18/2022 11:53 AM          Discharge Medication List as of 1/18/2022 11:53 AM          MISTY Schultz CNP    (Please note that portions of this note were completed with a voice recognition program. Efforts were made to edit the dictations but occasionally words are mis-transcribed.)         MISTY Schultz CNP  01/19/22 9619

## 2022-01-18 NOTE — ED TRIAGE NOTES
Pt presents to  with c/o cough, nausea, and covid concern. Pt reports symptoms started yesterday. Pt afebrile. Denies SOB.

## 2022-01-18 NOTE — Clinical Note
Sherrie Whitley was seen and treated in our emergency department on 1/18/2022. He may return to work on 01/22/2022. Can return if symptoms have resolved or nearly resolved and no fever for the final 24 hours without the use Tylenol and Motrin. If symptoms remain, quarantine will be day by day until the conditions are met, up to 10 days. You will need to wear a mask when in public for the next 5 days after ending quarantine. If you have any questions or concerns, please don't hesitate to call.       Gladis Arellano, APRN - CNP

## 2022-01-19 ENCOUNTER — TELEPHONE (OUTPATIENT)
Dept: FAMILY MEDICINE CLINIC | Age: 37
End: 2022-01-19

## 2022-01-19 DIAGNOSIS — U07.1 COVID-19: Primary | ICD-10-CM

## 2022-01-19 RX ORDER — CHOLECALCIFEROL (VITAMIN D3) 50 MCG
2000 TABLET ORAL DAILY
Qty: 30 TABLET | Refills: 0 | Status: SHIPPED | OUTPATIENT
Start: 2022-01-19 | End: 2022-09-26

## 2022-01-19 RX ORDER — MULTIVIT WITH MINERALS/LUTEIN
1000 TABLET ORAL DAILY
Qty: 30 TABLET | Refills: 0 | Status: SHIPPED | OUTPATIENT
Start: 2022-01-19 | End: 2022-09-26

## 2022-01-19 ASSESSMENT — ENCOUNTER SYMPTOMS
SHORTNESS OF BREATH: 0
VOMITING: 0
SORE THROAT: 0
DIARRHEA: 0
NAUSEA: 1
COUGH: 1
SINUS PRESSURE: 0

## 2022-01-19 NOTE — TELEPHONE ENCOUNTER
Ok  Will add vit C/D and zinc  Candidate for monoclonal ATB, if available, based on sxs onset, HTN and BMI    Does he want to pursue that to help reduce risk of progression to move severe dz? Let me know, thanks! Diagnosis Orders   1.  COVID-19  Ascorbic Acid (VITAMIN C) 1000 MG tablet    vitamin D (CHOLECALCIFEROL) 50 MCG (2000 UT) TABS tablet    zinc 50 MG CAPS

## 2022-01-19 NOTE — TELEPHONE ENCOUNTER
Understood  There's a change it may not be available  But keep us posted on what he wants to do    Let me know if questions, thanks!

## 2022-01-19 NOTE — TELEPHONE ENCOUNTER
Pt informed and verbalized understanding.     Pt will think about the infusion, discuss with his wife and will call us back

## 2022-01-19 NOTE — TELEPHONE ENCOUNTER
----- Message from Betsy Tillman DO sent at 1/19/2022  7:04 AM EST -----  Pt seen in UC yesterday for COVID  See when sxs started and how feeling today  Let me know, thanks!

## 2022-01-19 NOTE — TELEPHONE ENCOUNTER
Pt states his sxs started on Monday kd, no fever today,dry cough, some SOB on exertion, breathing a little better today

## 2022-01-20 ENCOUNTER — TELEPHONE (OUTPATIENT)
Dept: FAMILY MEDICINE CLINIC | Age: 37
End: 2022-01-20

## 2022-01-20 NOTE — TELEPHONE ENCOUNTER
----- Message from [place], DO sent at 1/20/2022  6:25 AM EST -----  Please f/u with pt and see how they are doing with their covid symptoms  Let me know, thanks!

## 2022-08-28 ENCOUNTER — TELEPHONE (OUTPATIENT)
Dept: FAMILY MEDICINE CLINIC | Age: 37
End: 2022-08-28

## 2022-08-28 DIAGNOSIS — I10 ESSENTIAL HYPERTENSION: ICD-10-CM

## 2022-08-28 DIAGNOSIS — F32.4 MAJOR DEPRESSIVE DISORDER WITH SINGLE EPISODE, IN PARTIAL REMISSION (HCC): Primary | ICD-10-CM

## 2022-08-29 RX ORDER — LISINOPRIL AND HYDROCHLOROTHIAZIDE 20; 12.5 MG/1; MG/1
1 TABLET ORAL DAILY
Qty: 30 TABLET | Refills: 1 | Status: SHIPPED | OUTPATIENT
Start: 2022-08-29 | End: 2022-09-27 | Stop reason: SDUPTHER

## 2022-08-29 RX ORDER — ESCITALOPRAM OXALATE 20 MG/1
20 TABLET ORAL DAILY
Qty: 30 TABLET | Refills: 1 | Status: SHIPPED | OUTPATIENT
Start: 2022-08-29 | End: 2022-09-27 | Stop reason: SDUPTHER

## 2022-08-29 NOTE — TELEPHONE ENCOUNTER
Last seen July 2021  Missed apt in Aug 2021    Needs f/u apt for further RF  Help schedule if ab le    Due for labs also  Labs ordered    30 days sent with 1 RF  No further RF's w/o apt    Let me know if questions, thanks! Diagnosis Orders   1. Major depressive disorder with single episode, in partial remission (HCC)  escitalopram (LEXAPRO) 20 MG tablet      2.  Essential hypertension  lisinopril-hydroCHLOROthiazide (PRINZIDE) 20-12.5 MG per tablet    CBC with Auto Differential    Comprehensive Metabolic Panel    Lipid Panel    TSH with Reflex    Microalbumin / Creatinine Urine Ratio

## 2022-08-29 NOTE — TELEPHONE ENCOUNTER
Recent Visits  Date Type Provider Dept   07/14/21 Office Visit Susanna Florence, DO [facility]   Showing recent visits within past 540 days with a meds authorizing provider and meeting all other requirements  Future Appointments  No visits were found meeting these conditions. Showing future appointments within next 150 days with a meds authorizing provider and meeting all other requirements     No future appointments.

## 2022-09-26 ENCOUNTER — HOSPITAL ENCOUNTER (OUTPATIENT)
Age: 37
Discharge: HOME | End: 2022-09-26
Attending: FAMILY MEDICINE | Admitting: FAMILY MEDICINE
Payer: COMMERCIAL

## 2022-09-26 ENCOUNTER — HOSPITAL ENCOUNTER (OUTPATIENT)
Age: 37
End: 2022-09-26

## 2022-09-26 DIAGNOSIS — I10 ESSENTIAL HYPERTENSION: ICD-10-CM

## 2022-09-26 LAB
ALBUMIN SERPL-MCNC: 4.8 G/DL (ref 3.5–5.1)
ALP BLD-CCNC: 90 U/L (ref 38–126)
ALT SERPL-CCNC: 33 U/L (ref 11–66)
ANION GAP SERPL CALCULATED.3IONS-SCNC: 10 MEQ/L (ref 8–16)
AST SERPL-CCNC: 23 U/L (ref 5–40)
BASOPHILS # BLD: 0.8 %
BASOPHILS ABSOLUTE: 0.1 THOU/MM3 (ref 0–0.1)
BILIRUB SERPL-MCNC: 0.5 MG/DL (ref 0.3–1.2)
BUN BLDV-MCNC: 12 MG/DL (ref 7–22)
CALCIUM SERPL-MCNC: 10.1 MG/DL (ref 8.5–10.5)
CHLORIDE BLD-SCNC: 101 MEQ/L (ref 98–111)
CHOLESTEROL, TOTAL: 235 MG/DL (ref 100–199)
CO2: 28 MEQ/L (ref 23–33)
CREAT SERPL-MCNC: 0.8 MG/DL (ref 0.4–1.2)
CREATININE, URINE: 165 MG/DL
EOSINOPHIL # BLD: 1.8 %
EOSINOPHILS ABSOLUTE: 0.1 THOU/MM3 (ref 0–0.4)
ERYTHROCYTE [DISTWIDTH] IN BLOOD BY AUTOMATED COUNT: 13.6 % (ref 11.5–14.5)
ERYTHROCYTE [DISTWIDTH] IN BLOOD BY AUTOMATED COUNT: 46.4 FL (ref 35–45)
GFR SERPL CREATININE-BSD FRML MDRD: > 90 ML/MIN/1.73M2
GLUCOSE BLD-MCNC: 85 MG/DL (ref 70–108)
HCT VFR BLD CALC: 56.1 % (ref 42–52)
HDLC SERPL-MCNC: 35 MG/DL
HEMOGLOBIN: 18.8 GM/DL (ref 14–18)
IMMATURE GRANS (ABS): 0.06 THOU/MM3 (ref 0–0.07)
IMMATURE GRANULOCYTES: 0.8 %
LDL CHOLESTEROL CALCULATED: 170 MG/DL
LYMPHOCYTES # BLD: 28.8 %
LYMPHOCYTES ABSOLUTE: 2 THOU/MM3 (ref 1–4.8)
MCH RBC QN AUTO: 31.5 PG (ref 26–33)
MCHC RBC AUTO-ENTMCNC: 33.5 GM/DL (ref 32.2–35.5)
MCV RBC AUTO: 94.1 FL (ref 80–94)
MICROALBUMIN UR-MCNC: < 1.2 MG/DL
MICROALBUMIN/CREAT UR-RTO: 7 MG/G (ref 0–30)
MONOCYTES # BLD: 7.2 %
MONOCYTES ABSOLUTE: 0.5 THOU/MM3 (ref 0.4–1.3)
NUCLEATED RED BLOOD CELLS: 0 /100 WBC
PATHOLOGIST REVIEW: ABNORMAL
PLATELET # BLD: 268 THOU/MM3 (ref 130–400)
PMV BLD AUTO: 10.1 FL (ref 9.4–12.4)
POTASSIUM SERPL-SCNC: 4.2 MEQ/L (ref 3.5–5.2)
RBC # BLD: 5.96 MILL/MM3 (ref 4.7–6.1)
SEG NEUTROPHILS: 60.6 %
SEGMENTED NEUTROPHILS ABSOLUTE COUNT: 4.3 THOU/MM3 (ref 1.8–7.7)
SODIUM BLD-SCNC: 139 MEQ/L (ref 135–145)
TOTAL PROTEIN: 7.7 G/DL (ref 6.1–8)
TRIGL SERPL-MCNC: 152 MG/DL (ref 0–199)
TSH SERPL DL<=0.05 MIU/L-ACNC: 1.02 UIU/ML (ref 0.4–4.2)
WBC # BLD: 7.1 THOU/MM3 (ref 4.8–10.8)

## 2022-09-26 PROCEDURE — 80061 LIPID PANEL: CPT

## 2022-09-26 PROCEDURE — 84443 ASSAY THYROID STIM HORMONE: CPT

## 2022-09-26 PROCEDURE — 82043 UR ALBUMIN QUANTITATIVE: CPT

## 2022-09-26 PROCEDURE — 36415 COLL VENOUS BLD VENIPUNCTURE: CPT

## 2022-09-26 PROCEDURE — 85025 COMPLETE CBC W/AUTO DIFF WBC: CPT

## 2022-09-26 PROCEDURE — 80053 COMPREHEN METABOLIC PANEL: CPT

## 2022-09-26 NOTE — PATIENT INSTRUCTIONS
LAB INSTRUCTIONS:    Please complete labs IN 4 week(s). Please fast for 8 hours prior to lab collection. The clinic will call you within 1 week of collection. If you have not heard from us within that amount of time, please call us at [phone].

## 2022-09-26 NOTE — PROGRESS NOTES
Chief Complaint   Patient presents with    Follow-up     Chronic issues as noted below     History obtained from the patient. SUBJECTIVE:  Adam Dunbar is a 44 y.o. male that presents today for     -LAST VISIT:  Patient admitted to [facility] from 7/5 to 7/7 for issues as noted below. D/c summary: \"Hospital Course:      Adam Dunbar is a 38 y.o. male admitted to [facility] on 7/5/2021 for atypical chest pain. Acute coronary syndrome ruled out by negative enzymes, EKG and inpatient cardiac stress test.        Stable for home discharge today. \"      Since discharge:  Doing about the same  con't to have constant chest pain  A little more with exertion   Otherwise constant  From of chest, acey  Not sharp  No changes with position    Also c/o TUCKER that persists  This started a few wks before the CP  No SOB at rest  Gets winded on exertion  Hx of asthma, occ wheeze  No cough    Had stress and echo during admission, both WNL  cxr clear  D-dimer neg  No evidence for pericarditis or myocarditis. Also c/w persistent fatigue the last few months  Snores a lot  Unclear if stops breathing at night  +STOP BANG    BP <140/90 since d/c    UPDATE TODAY:   Lost to f/u  Last seen July 2021  Did not f/u here for cardio  However, doing better  No further CP  No SOB  BP's remain <140/90, taking meds as rx'd  Con't to have fatigue and daytime somnolence. Was referred to sleep clinic, but didn't go. Needs new referral  Labs show elevated lipids. Never high before. Diet the same      -Depressed Mood PRIOR VISIT:    HPI:  Been feeling depressed for the last year  Never dx before or treated  Has occ vague thoughts of not being around  No active SI  No HI    Depressed Mood? yes -   Anhedonia? yes -   Appetite changes? yes -   Sleep disturbances? yes -   Feelings of guilt? yes -   Decreased energy? yes -   Impaired concentration?  yes -   Substance abuse? no      UPDATE PRIOR VISIT:   Lost to f/u on this  N/s on [address]  On 10 mg lexapro  Working well overall, still feeling some depression  No side effects  No SI/HI  Asking if can inc dose of lexapro     UPDATE TODAY Moods improved on inc dose of lexapro  Working well  No side effects  No SI/HI       -Polycythemia:  Mild  Noted on labs  Never high before  Is a smoker      -Tobacco:  Is still chewing tobacco, but not as much as before. Is 1 PPD right now. Wants to quit. Age/Gender Health Maintenance    Lipid -   No components found for: CHLPL  Lab Results   Component Value Date    TRIG 152 09/26/2022    TRIG 77 08/05/2020    TRIG 113 07/30/2019     Lab Results   Component Value Date    HDL 35 09/26/2022    HDL 31 08/05/2020    HDL 39 07/30/2019     Lab Results   Component Value Date    LDLCALC 170 09/26/2022    [address] 112 08/05/2020    [address] 91 07/30/2019     No results found for: LABVLDL      DM Screen -   Lab Results   Component Value Date/Time    GLUCOSE 85 09/26/2022 10:53 AM     Lab Results   Component Value Date/Time    LABA1C 5.2 08/06/2019 11:47 AM       Colon Cancer Screening - Age 39  LDCT: age 48 if meets criteria    Tetanus - Declines July 2017/July 2019/SEPT 2022  Influenza Vaccine - Declines SEPT 2022  Pneumonia Vaccine - declines SEPT 2022  Zoster - age 48     PSA testing discussion - Age 54  AAA Screening - Age 72; smoked      Current Outpatient Medications   Medication Sig Dispense Refill    lisinopril-hydroCHLOROthiazide (PRINZIDE) 20-12.5 MG per tablet Take 1 tablet by mouth daily 90 tablet 3    escitalopram (LEXAPRO) 20 MG tablet Take 1 tablet by mouth daily 90 tablet 3    buPROPion (WELLBUTRIN SR) 150 MG extended release tablet Take 1 tablet by mouth daily for 3 days, THEN 1 tablet 2 times daily. . Use for 3 months. . 180 tablet 0    albuterol sulfate HFA (VENTOLIN HFA) 108 (90 Base) MCG/ACT inhaler Inhale 2 puffs into the lungs every 4 hours as needed for Wheezing or Shortness of Breath 1 Inhaler 3    meloxicam (MOBIC) 15 MG tablet Take 1 headache     NAFLD (nonalcoholic fatty liver disease)     Obesity (BMI 30-39. 9)     Rheumatoid arthritis [facility])     sees Dr Enid Cueva    S/P robotic assisted LEFT inguinal hernia repair 11/13/2020       Past Surgical History:   Procedure Laterality Date    APPENDECTOMY  05/02/2013    Dr. Duong Sox    COLONOSCOPY  2015    [facility]  2018, 2019    [facility]    ENDOSCOPY, COLON, DIAGNOSTIC      FINGER SURGERY  10/17/15    left pinky finger    HERNIA REPAIR Left 11/13/2020    ROBOTIC LEFT INGUINAL HERNIA REPAIR, with mesh performed by Oscar Wing MD at [facility]  05/02/2013    Dr. Henriquez 103  2017    [facility] Allergies   Allergen Reactions    Plaquenil [Hydroxychloroquine Sulfate] Nausea And Vomiting       Social History     Tobacco Use    Smoking status: Every Day     Packs/day: 1.00     Years: 12.00     Pack years: 12.00     Types: Cigarettes    Smokeless tobacco: Former     Types: Chew   Substance Use Topics    Alcohol use: No     Comment: rare       Family History   Problem Relation Age of Onset    Heart Disease Mother 52        MI    Early Death Father         MVA    Prostate Cancer Paternal Grandfather         unsure age    High Blood Pressure Other         Pt unsure if anyone in family does. Colon Cancer Neg Hx          I have reviewed the patient's past medical history, past surgical history, allergies, medications, social and family history and I have made updates where appropriate.       Review of Systems  Positive responses are highlighted in bold    Constitutional:  Fever, Chills, Night Sweats, Fatigue, Unexpected changes in weight  HENT:  Ear pain, Tinnitus, Nosebleeds, Trouble swallowing, Hearing loss, Sore throat  Cardiovascular:  Chest Pain, Palpitations, Orthopnea, Paroxysmal Nocturnal Dyspnea  Respiratory:  Cough, Wheezing, Shortness of breath, Chest tightness, Apnea  Gastrointestinal: Nausea, Vomiting, Diarrhea, Constipation, Heartburn, Blood in stool  Genitourinary:  Difficulty or painful urination, Flank pain, Change in frequency, Urgency  Skin:  Color change, Rash, Itching, Wound  Musculoskeletal:  Joint pain, Back pain, Gait problems, Joint swelling, Myalgias  Neurological:  Dizziness, Headaches, Presyncope, Numbness, Seizures, Tremors  Endocrine:  Heat Intolerance, Cold Intolerance, Polydipsia, Polyphagia, Polyuria      PHYSICAL EXAM:  Vitals:    09/27/22 1317   BP: 120/80   Site: Right Upper Arm   Position: Sitting   Cuff Size: Large Adult   Pulse: 68   Resp: 16   Temp: 97.8 °F (36.6 °C)   SpO2: 98%   Weight: 212 lb 6.4 oz (96.3 kg)   Height: 5' 10\" (1.778 m)     Body mass index is 30.48 kg/m². VS Reviewed  General Appearance: A&O x 3, No acute distress,well developed and well- nourished  Eyes: pupils equal, round, and reactive to light, extraocular eye movements intact, conjunctivae and eye lids without erythema  ENT: external ear and ear canal clear bilaterally, TMs intact and regular, nose without deformity, nasal mucosa and turbinates normal without polyps, oropharynx normal, dentition is normal for age  Neck: supple and non-tender without mass, no thyromegaly or thyroid nodules, no cervical lymphadenopathy  Pulmonary/Chest: clear to auscultation bilaterally- no wheezes, rales or rhonchi, normal air movement, no respiratory distress or retractions  Cardiovascular: S1 and S2 auscultated w/ RRR. No murmurs, rubs, clicks, or gallops, distal pulses intact. Abdomen: soft, non-tender, non-distended, bowel sounds physiologic,  no rebound or guarding, no masses or hernias noted. Liver and spleen without enlargement. Extremities: no cyanosis, clubbing or edema of the lower extremities. Skin: warm and dry, no rash or erythema  Psych: Affect appropriate. Mood euthymic. Thought process is normal without evidence of depression or psychosis. Good insight and appropriate interaction. Cognition and memory appear to be intact. Lab Results   Component Value Date    WBC 7.1 09/26/2022    HGB 18.8 (H) 09/26/2022    HCT 56.1 (H) 09/26/2022    MCV 94.1 (H) 09/26/2022     09/26/2022     Lab Results   Component Value Date/Time     09/26/2022 10:53 AM    K 4.2 09/26/2022 10:53 AM     09/26/2022 10:53 AM    CO2 28 09/26/2022 10:53 AM    BUN 12 09/26/2022 10:53 AM    CREATININE 0.8 09/26/2022 10:53 AM    GLUCOSE 85 09/26/2022 10:53 AM    CALCIUM 10.1 09/26/2022 10:53 AM     Lab Results   Component Value Date    DDIMER < 215.00 07/05/2021        Narrative   PROCEDURE: XR CHEST PORTABLE       CLINICAL INFORMATION: cp.       COMPARISON: Chest x-ray dated 18 May 2020. TECHNIQUE: AP upright view of the chest.       FINDINGS:           The heart size is normal.The mediastinum is not widened. There are no pulmonary infiltrates or effusions. The pulmonary vascularity is normal.   No suspicious osseous lesions are present. Impression   No interval change since previous study dated 18 May 2020, no acute cardiopulmonary disease. .                   **This report has been created using voice recognition software. It may contain minor errors which are inherent in voice recognition technology. **       Final report electronically signed by DR Pratik Mendez on 7/5/2021 12:05 PM       Lexiscan Stress Test 07 July 2021   Summary   There is moderate attenuation artifact noted in the inferior wall seems to   be related to bowel artifact. The nuclear images is not suggestive for myocardial ischemia. Recommendation   Medical management-no intervention. Clinical correlation is recommended. ECHO 06 July 2021   Summary   Normal left ventricle size and systolic function. Ejection fraction was   estimated at 55 to 60 %. There were no regional left ventricular wall   motion abnormalities and wall thickness was within normal limits. ASSESSMENT & PLAN  1.  Atypical chest pain    No recurrence  Neg w/u  Never did f/u with cardio, but doing better  Monitor and f/u if returns  Reviewed ER precautions, pt understands. 2. TUCKER (dyspnea on exertion)    Improved  Monitor for recurrence    3. History of asthma      4. Fatigue, unspecified type    Labs neg  Suspect YUKO  Sleep referral placed again    5. Sleep disturbance    - [facility]    6. Essential hypertension    Stable  At goal  Con't Prinizide    - lisinopril-hydroCHLOROthiazide (PRINZIDE) 20-12.5 MG per tablet; Take 1 tablet by mouth daily  Dispense: 90 tablet; Refill: 3    7. Dyslipidemia    Lipids higher than prior, but quite a bit  Will plan to repeat labs in 4 wks and intervene based on results    - Lipid Panel; Future    8. Major depressive disorder with single episode, in full remission (HCC)    Stable  Con't lexapro  F/u 6 months    - escitalopram (LEXAPRO) 20 MG tablet; Take 1 tablet by mouth daily  Dispense: 90 tablet; Refill: 3    9. Polycythemia    Likely related to smoking  Repeat 4 wks  F/u based on results    - CBC with Auto Differential; Future    10. Chewing tobacco nicotine dependence without complication    Start wellbutrin  F/u if unable to quit    - buPROPion (WELLBUTRIN SR) 150 MG extended release tablet; Take 1 tablet by mouth daily for 3 days, THEN 1 tablet 2 times daily. . Use for 3 months. .  Dispense: 180 tablet; Refill: 0    11. Cigarette nicotine dependence without complication    As per # 10    - buPROPion (WELLBUTRIN SR) 150 MG extended release tablet; Take 1 tablet by mouth daily for 3 days, THEN 1 tablet 2 times daily. . Use for 3 months. .  Dispense: 180 tablet; Refill: 0      DISPOSITION    Return in about 6 months (around 3/27/2023) for follow-up on chronic medical conditions, sooner as needed. Irma Zamora released without restrictions.     Future Appointments   Date Time Provider Tylor Reyes   3/27/2023  1:20 PM Simin Santo DO [address] COUNSELING    Barriers to learning and self management: none    Discussed use, benefit, and side effects of prescribed medications. Barriers to medication compliance addressed. All patient questions answered. Pt voiced understanding.        Electronically signed by Wilder Caballero DO on 9/27/2022 at 1:43 PM

## 2022-09-27 ENCOUNTER — OFFICE VISIT (OUTPATIENT)
Dept: FAMILY MEDICINE CLINIC | Age: 37
End: 2022-09-27
Payer: COMMERCIAL

## 2022-09-27 VITALS
HEART RATE: 68 BPM | BODY MASS INDEX: 30.41 KG/M2 | WEIGHT: 212.4 LBS | TEMPERATURE: 97.8 F | OXYGEN SATURATION: 98 % | RESPIRATION RATE: 16 BRPM | DIASTOLIC BLOOD PRESSURE: 80 MMHG | SYSTOLIC BLOOD PRESSURE: 120 MMHG | HEIGHT: 70 IN

## 2022-09-27 DIAGNOSIS — G47.9 SLEEP DISTURBANCE: ICD-10-CM

## 2022-09-27 DIAGNOSIS — R53.83 FATIGUE, UNSPECIFIED TYPE: ICD-10-CM

## 2022-09-27 DIAGNOSIS — R07.89 ATYPICAL CHEST PAIN: Primary | ICD-10-CM

## 2022-09-27 DIAGNOSIS — F17.220 CHEWING TOBACCO NICOTINE DEPENDENCE WITHOUT COMPLICATION: ICD-10-CM

## 2022-09-27 DIAGNOSIS — F32.5 MAJOR DEPRESSIVE DISORDER WITH SINGLE EPISODE, IN FULL REMISSION (HCC): ICD-10-CM

## 2022-09-27 DIAGNOSIS — E78.5 DYSLIPIDEMIA: ICD-10-CM

## 2022-09-27 DIAGNOSIS — I10 ESSENTIAL HYPERTENSION: ICD-10-CM

## 2022-09-27 DIAGNOSIS — R06.09 DOE (DYSPNEA ON EXERTION): ICD-10-CM

## 2022-09-27 DIAGNOSIS — D75.1 POLYCYTHEMIA: ICD-10-CM

## 2022-09-27 DIAGNOSIS — Z87.09 HISTORY OF ASTHMA: ICD-10-CM

## 2022-09-27 DIAGNOSIS — F17.210 CIGARETTE NICOTINE DEPENDENCE WITHOUT COMPLICATION: ICD-10-CM

## 2022-09-27 PROCEDURE — 99214 OFFICE O/P EST MOD 30 MIN: CPT | Performed by: FAMILY MEDICINE

## 2022-09-27 RX ORDER — ESCITALOPRAM OXALATE 20 MG/1
20 TABLET ORAL DAILY
Qty: 90 TABLET | Refills: 3 | Status: SHIPPED | OUTPATIENT
Start: 2022-09-27

## 2022-09-27 RX ORDER — LISINOPRIL AND HYDROCHLOROTHIAZIDE 20; 12.5 MG/1; MG/1
1 TABLET ORAL DAILY
Qty: 90 TABLET | Refills: 3 | Status: SHIPPED | OUTPATIENT
Start: 2022-09-27

## 2022-09-27 RX ORDER — BUPROPION HYDROCHLORIDE 150 MG/1
TABLET, EXTENDED RELEASE ORAL
Qty: 180 TABLET | Refills: 0 | Status: SHIPPED | OUTPATIENT
Start: 2022-09-27 | End: 2022-12-26

## 2022-09-27 SDOH — ECONOMIC STABILITY: FOOD INSECURITY: WITHIN THE PAST 12 MONTHS, THE FOOD YOU BOUGHT JUST DIDN'T LAST AND YOU DIDN'T HAVE MONEY TO GET MORE.: NEVER TRUE

## 2022-09-27 SDOH — ECONOMIC STABILITY: FOOD INSECURITY: WITHIN THE PAST 12 MONTHS, YOU WORRIED THAT YOUR FOOD WOULD RUN OUT BEFORE YOU GOT MONEY TO BUY MORE.: NEVER TRUE

## 2022-09-27 ASSESSMENT — PATIENT HEALTH QUESTIONNAIRE - PHQ9
SUM OF ALL RESPONSES TO PHQ QUESTIONS 1-9: 4
SUM OF ALL RESPONSES TO PHQ QUESTIONS 1-9: 4
1. LITTLE INTEREST OR PLEASURE IN DOING THINGS: 0
4. FEELING TIRED OR HAVING LITTLE ENERGY: 1
6. FEELING BAD ABOUT YOURSELF - OR THAT YOU ARE A FAILURE OR HAVE LET YOURSELF OR YOUR FAMILY DOWN: 0
3. TROUBLE FALLING OR STAYING ASLEEP: 1
9. THOUGHTS THAT YOU WOULD BE BETTER OFF DEAD, OR OF HURTING YOURSELF: 0
2. FEELING DOWN, DEPRESSED OR HOPELESS: 0
10. IF YOU CHECKED OFF ANY PROBLEMS, HOW DIFFICULT HAVE THESE PROBLEMS MADE IT FOR YOU TO DO YOUR WORK, TAKE CARE OF THINGS AT HOME, OR GET ALONG WITH OTHER PEOPLE: 0
SUM OF ALL RESPONSES TO PHQ QUESTIONS 1-9: 4
SUM OF ALL RESPONSES TO PHQ9 QUESTIONS 1 & 2: 0
8. MOVING OR SPEAKING SO SLOWLY THAT OTHER PEOPLE COULD HAVE NOTICED. OR THE OPPOSITE, BEING SO FIGETY OR RESTLESS THAT YOU HAVE BEEN MOVING AROUND A LOT MORE THAN USUAL: 0
5. POOR APPETITE OR OVEREATING: 1
7. TROUBLE CONCENTRATING ON THINGS, SUCH AS READING THE NEWSPAPER OR WATCHING TELEVISION: 1
SUM OF ALL RESPONSES TO PHQ QUESTIONS 1-9: 4

## 2022-09-27 ASSESSMENT — SOCIAL DETERMINANTS OF HEALTH (SDOH): HOW HARD IS IT FOR YOU TO PAY FOR THE VERY BASICS LIKE FOOD, HOUSING, MEDICAL CARE, AND HEATING?: NOT HARD AT ALL

## 2022-09-29 ENCOUNTER — PATIENT MESSAGE (OUTPATIENT)
Dept: FAMILY MEDICINE CLINIC | Age: 37
End: 2022-09-29

## 2022-09-29 DIAGNOSIS — L29.9 PRURITUS: Primary | ICD-10-CM

## 2022-09-29 RX ORDER — CETIRIZINE HYDROCHLORIDE 10 MG/1
10 TABLET ORAL EVERY EVENING
Qty: 30 TABLET | Refills: 1 | Status: SHIPPED | OUTPATIENT
Start: 2022-09-29

## 2022-09-29 NOTE — TELEPHONE ENCOUNTER
From: Maurice Bell  To: Dr. Rikki Alegre: 9/29/2022 2:57 PM EDT  Subject: Itching     I ment to ask on tues during my appointment and my wife reminded me today. My feet n lower legs mainly have been itching a lot over the last couple months and theres no rash. It itches almost constantly n it worse after I get in n out the shower. Standing here writing this I just wanna dig at my legs.

## 2022-09-30 NOTE — TELEPHONE ENCOUNTER
Future Appointments   Date Time Provider Tylor Reyes   10/5/2022 10:40 AM [facility] [email or web address]   3/27/2023  1:20 PM Dorina Nix DO [address] [place] [address]

## 2022-10-04 NOTE — PROGRESS NOTES
Chief Complaint   Patient presents with    Pruritis     Legs for months       History obtained from the patient. SUBJECTIVE:  Carolyne Lee is a 44 y.o. male that presents today for     -Pruritus:  Going on the last few months  Bilat LE, hips on down  No rashes  Is constant, worse in shower when water its it  Otherwise is able to control it. No new meds, soaps or detergents  Using OTC eczema crm and lotions, ineffective. I started pt on zyrtec last wk, when he sent a Numerate message about it. Hasn't really helped. Age/Gender Health Maintenance    Lipid -   No components found for: CHLPL  Lab Results   Component Value Date    TRIG 152 09/26/2022    TRIG 77 08/05/2020    TRIG 113 07/30/2019     Lab Results   Component Value Date    HDL 35 09/26/2022    HDL 31 08/05/2020    HDL 39 07/30/2019     Lab Results   Component Value Date    LDLCALC 170 09/26/2022    LDLCALC 112 08/05/2020    [facility] 91 07/30/2019     No results found for: LABVLDL      DM Screen -   Lab Results   Component Value Date/Time    GLUCOSE 85 09/26/2022 10:53 AM     Lab Results   Component Value Date/Time    LABA1C 5.2 08/06/2019 11:47 AM       Colon Cancer Screening - Age 39  LDCT: age 48 if meets criteria    Tetanus - Declines July 2017/July 2019/SEPT 2022  Influenza Vaccine - Declines SEPT 2022  Pneumonia Vaccine - declines SEPT 2022  Zoster - age 48     PSA testing discussion - Age 54  AAA Screening - Age 72; smoked      Current Outpatient Medications   Medication Sig Dispense Refill    cetirizine (ZYRTEC) 10 MG tablet Take 1 tablet by mouth every evening 30 tablet 1    lisinopril-hydroCHLOROthiazide (PRINZIDE) 20-12.5 MG per tablet Take 1 tablet by mouth daily 90 tablet 3    escitalopram (LEXAPRO) 20 MG tablet Take 1 tablet by mouth daily 90 tablet 3    buPROPion (WELLBUTRIN SR) 150 MG extended release tablet Take 1 tablet by mouth daily for 3 days, THEN 1 tablet 2 times daily. . Use for 3 months. . 180 tablet 0    albuterol sulfate HFA (VENTOLIN HFA) 108 (90 Base) MCG/ACT inhaler Inhale 2 puffs into the lungs every 4 hours as needed for Wheezing or Shortness of Breath 1 Inhaler 3    meloxicam (MOBIC) 15 MG tablet Take 1 tablet by mouth daily as needed for Pain 30 tablet 3    Methotrexate, PF, 12.5 MG/0.4ML SOAJ Inject 17.5 mg into the skin once a week Usually Wed. Folic Acid 5 MG CAPS Take 5 mg by mouth every other day       omeprazole (PRILOSEC) 20 MG delayed release capsule Take 1 capsule by mouth daily 90 capsule 3     No current facility-administered medications for this visit. No orders of the defined types were placed in this encounter. All medications reviewed and reconciled, including OTC and herbal medications. Updated list given to patient. Patient Active Problem List   Diagnosis    History of asthma    Migraine headache    Hypertension    Dependence on nicotine from chewing tobacco    Eczema    GERD (gastroesophageal reflux disease)    Irritable bowel syndrome with diarrhea    NAFLD (nonalcoholic fatty liver disease)    Rheumatoid arthritis (HCC)    Cervicalgia    Chronic bilateral low back pain without sciatica    Obesity (BMI 30-39. 9)    S/P robotic assisted LEFT inguinal hernia repair    Major depression    Atypical chest pain    Dependence on nicotine from cigarettes    TUCKER (dyspnea on exertion)    Fatigue    Sleep disturbance       Past Medical History:   Diagnosis Date    Dependence on nicotine from chewing tobacco 06/23/2015    Dependence on nicotine from cigarettes     Eczema 06/23/2015    GERD (gastroesophageal reflux disease)     History of asthma     Hypertension 09/30/2014    Irritable bowel syndrome with diarrhea     Major depression     Migraine headache     NAFLD (nonalcoholic fatty liver disease)     Obesity (BMI 30-39. 9)     Rheumatoid arthritis [facility])     sees Dr Brock Retana    S/P robotic assisted LEFT inguinal hernia repair 11/13/2020       Past Surgical History:   Procedure Laterality Date    APPENDECTOMY  05/02/2013    Dr. Miguel Lomas    COLONOSCOPY  2015    [facility]  2018, 2019    [facility]    ENDOSCOPY, COLON, DIAGNOSTIC      FINGER SURGERY  10/17/15    left pinky finger    HERNIA REPAIR Left 11/13/2020    ROBOTIC LEFT INGUINAL HERNIA REPAIR, with mesh performed by Aviva Allen MD at [facility]  05/02/2013    Dr. Naomi Vargas  2017    [facility] Allergies   Allergen Reactions    Plaquenil [Hydroxychloroquine Sulfate] Nausea And Vomiting       Social History     Tobacco Use    Smoking status: Every Day     Packs/day: 1.00     Years: 12.00     Pack years: 12.00     Types: Cigarettes    Smokeless tobacco: Former     Types: Chew   Substance Use Topics    Alcohol use: No     Comment: rare       Family History   Problem Relation Age of Onset    Heart Disease Mother 52        MI    Early Death Father         MVA    Prostate Cancer Paternal Grandfather         unsure age    High Blood Pressure Other         Pt unsure if anyone in family does. Colon Cancer Neg Hx          I have reviewed the patient's past medical history, past surgical history, allergies, medications, social and family history and I have made updates where appropriate.       Review of Systems  Positive responses are highlighted in bold    Constitutional:  Fever, Chills, Night Sweats, Fatigue, Unexpected changes in weight  HENT:  Ear pain, Tinnitus, Nosebleeds, Trouble swallowing, Hearing loss, Sore throat  Cardiovascular:  Chest Pain, Palpitations, Orthopnea, Paroxysmal Nocturnal Dyspnea  Respiratory:  Cough, Wheezing, Shortness of breath, Chest tightness, Apnea  Gastrointestinal:  Nausea, Vomiting, Diarrhea, Constipation, Heartburn, Blood in stool  Genitourinary:  Difficulty or painful urination, Flank pain, Change in frequency, Urgency  Skin:  Color change, Rash, Itching, Wound  Musculoskeletal:  Joint pain, Back pain, Gait problems, Joint swelling, Myalgias  Neurological:  Dizziness, Headaches, Presyncope, Numbness, Seizures, Tremors  Endocrine:  Heat Intolerance, Cold Intolerance, Polydipsia, Polyphagia, Polyuria      PHYSICAL EXAM:  Vitals:    10/05/22 1044   BP: 120/78   Site: Right Upper Arm   Position: Sitting   Cuff Size: Large Adult   Pulse: 78   Resp: 16   Temp: 97.6 °F (36.4 °C)   SpO2: 97%   Weight: 208 lb 9.6 oz (94.6 kg)   Height: 5' 9\" (1.753 m)     Body mass index is 30.8 kg/m². VS Reviewed  General Appearance: A&O x 3, No acute distress,well developed and well- nourished  Eyes: pupils equal, round, and reactive to light, extraocular eye movements intact, conjunctivae and eye lids without erythema  ENT: external ear and ear canal clear bilaterally, TMs intact and regular, nose without deformity, nasal mucosa and turbinates normal without polyps, oropharynx normal, dentition is normal for age  Neck: supple and non-tender without mass, no thyromegaly or thyroid nodules, no cervical lymphadenopathy  Pulmonary/Chest: clear to auscultation bilaterally- no wheezes, rales or rhonchi, normal air movement, no respiratory distress or retractions  Cardiovascular: S1 and S2 auscultated w/ RRR. No murmurs, rubs, clicks, or gallops, distal pulses intact. Abdomen: soft, non-tender, non-distended, bowel sounds physiologic,  no rebound or guarding, no masses or hernias noted. Liver and spleen without enlargement. Extremities: no cyanosis, clubbing or edema of the lower extremities.    Skin: warm and dry, no rash or erythema    Lab Results   Component Value Date    WBC 7.1 09/26/2022    HGB 18.8 (H) 09/26/2022    HCT 56.1 (H) 09/26/2022    MCV 94.1 (H) 09/26/2022     09/26/2022     Lab Results   Component Value Date     09/26/2022    K 4.2 09/26/2022     09/26/2022    CO2 28 09/26/2022    BUN 12 09/26/2022    CREATININE 0.8 09/26/2022    GLUCOSE 85 09/26/2022    CALCIUM 10.1 09/26/2022    PROT 7.7 09/26/2022 LABALBU 4.8 09/26/2022    BILITOT 0.5 09/26/2022    ALKPHOS 90 09/26/2022    AST 23 09/26/2022    ALT 33 09/26/2022    LABGLOM >90 09/26/2022     Lab Results   Component Value Date    TSH 1.020 09/26/2022       ASSESSMENT & PLAN  1. Pruritus    Persistent  Generalized to the legs  Unclear etiology  No rash or dry skin  Recent labs, reviewed above reassuring    Plan:  Get labs below and cxr  F/u based on results  Since zyrtec not helping, can try benadryl in the mean time    - HIV Screen; Future  - Hepatitis C Antibody; Future  - Hepatitis B Surface Antigen; Future  - Ova & Parasite Id/Count #1; Future  - XR CHEST (2 VW); Future      DISPOSITION    Return if symptoms worsen or fail to improve. Jodi Jewell released without restrictions. Future Appointments   Date Time Provider Tylor Reyes   3/27/2023  1:20 PM Mitali Cerrato DO [facility] [address]     PATIENT COUNSELING    Barriers to learning and self management: none    Discussed use, benefit, and side effects of prescribed medications. Barriers to medication compliance addressed. All patient questions answered. Pt voiced understanding.        Electronically signed by Mitali Cerrato DO on 10/5/2022 at 12:01 PM

## 2022-10-05 ENCOUNTER — OFFICE VISIT (OUTPATIENT)
Dept: FAMILY MEDICINE CLINIC | Age: 37
End: 2022-10-05
Payer: COMMERCIAL

## 2022-10-05 VITALS
DIASTOLIC BLOOD PRESSURE: 78 MMHG | HEART RATE: 78 BPM | SYSTOLIC BLOOD PRESSURE: 120 MMHG | BODY MASS INDEX: 30.9 KG/M2 | HEIGHT: 69 IN | TEMPERATURE: 97.6 F | OXYGEN SATURATION: 97 % | RESPIRATION RATE: 16 BRPM | WEIGHT: 208.6 LBS

## 2022-10-05 DIAGNOSIS — L29.9 PRURITUS: Primary | ICD-10-CM

## 2022-10-05 PROCEDURE — 99214 OFFICE O/P EST MOD 30 MIN: CPT | Performed by: FAMILY MEDICINE

## 2022-10-05 NOTE — PATIENT INSTRUCTIONS
LAB INSTRUCTIONS:    Please complete labs within 2 week(s). Non-fasting is fine. The clinic will call you within 1 week of collection. If you have not heard from us within that amount of time, please call us at [phone].

## 2022-10-25 ENCOUNTER — HOSPITAL ENCOUNTER (OUTPATIENT)
Age: 37
Discharge: HOME | End: 2022-10-25
Payer: COMMERCIAL

## 2022-10-25 ENCOUNTER — HOSPITAL ENCOUNTER (OUTPATIENT)
Dept: GENERAL RADIOLOGY | Age: 37
Discharge: HOME | End: 2022-10-25
Payer: COMMERCIAL

## 2022-10-25 DIAGNOSIS — L29.9 PRURITUS: ICD-10-CM

## 2022-10-25 DIAGNOSIS — D75.1 POLYCYTHEMIA: ICD-10-CM

## 2022-10-25 DIAGNOSIS — E78.5 DYSLIPIDEMIA: ICD-10-CM

## 2022-10-25 LAB
BASOPHILS # BLD: 0.7 %
BASOPHILS ABSOLUTE: 0.1 THOU/MM3 (ref 0–0.1)
CHOLESTEROL, TOTAL: 201 MG/DL (ref 100–199)
EOSINOPHIL # BLD: 1.2 %
EOSINOPHILS ABSOLUTE: 0.1 THOU/MM3 (ref 0–0.4)
ERYTHROCYTE [DISTWIDTH] IN BLOOD BY AUTOMATED COUNT: 13.1 % (ref 11.5–14.5)
ERYTHROCYTE [DISTWIDTH] IN BLOOD BY AUTOMATED COUNT: 44.9 FL (ref 35–45)
HCT VFR BLD CALC: 52.2 % (ref 42–52)
HDLC SERPL-MCNC: 37 MG/DL
HEMOGLOBIN: 17.4 GM/DL (ref 14–18)
HEPATITIS B SURFACE ANTIGEN: NEGATIVE
HEPATITIS C ANTIBODY: NEGATIVE
IMMATURE GRANS (ABS): 0.05 THOU/MM3 (ref 0–0.07)
IMMATURE GRANULOCYTES: 0.7 %
LDL CHOLESTEROL CALCULATED: 141 MG/DL
LYMPHOCYTES # BLD: 23.2 %
LYMPHOCYTES ABSOLUTE: 1.7 THOU/MM3 (ref 1–4.8)
MCH RBC QN AUTO: 31.2 PG (ref 26–33)
MCHC RBC AUTO-ENTMCNC: 33.3 GM/DL (ref 32.2–35.5)
MCV RBC AUTO: 93.7 FL (ref 80–94)
MONOCYTES # BLD: 7 %
MONOCYTES ABSOLUTE: 0.5 THOU/MM3 (ref 0.4–1.3)
NUCLEATED RED BLOOD CELLS: 0 /100 WBC
PLATELET # BLD: 230 THOU/MM3 (ref 130–400)
PMV BLD AUTO: 10.4 FL (ref 9.4–12.4)
RBC # BLD: 5.57 MILL/MM3 (ref 4.7–6.1)
SEG NEUTROPHILS: 67.2 %
SEGMENTED NEUTROPHILS ABSOLUTE COUNT: 4.9 THOU/MM3 (ref 1.8–7.7)
TRIGL SERPL-MCNC: 114 MG/DL (ref 0–199)
WBC # BLD: 7.3 THOU/MM3 (ref 4.8–10.8)

## 2022-10-25 PROCEDURE — 71046 X-RAY EXAM CHEST 2 VIEWS: CPT

## 2022-10-25 PROCEDURE — 86803 HEPATITIS C AB TEST: CPT

## 2022-10-25 PROCEDURE — 85025 COMPLETE CBC W/AUTO DIFF WBC: CPT

## 2022-10-25 PROCEDURE — 87389 HIV-1 AG W/HIV-1&-2 AB AG IA: CPT

## 2022-10-25 PROCEDURE — 36415 COLL VENOUS BLD VENIPUNCTURE: CPT

## 2022-10-25 PROCEDURE — 87340 HEPATITIS B SURFACE AG IA: CPT

## 2022-10-25 PROCEDURE — 80061 LIPID PANEL: CPT

## 2022-10-26 ENCOUNTER — TELEPHONE (OUTPATIENT)
Dept: FAMILY MEDICINE CLINIC | Age: 37
End: 2022-10-26

## 2022-10-26 DIAGNOSIS — L29.9 PRURITUS: Primary | ICD-10-CM

## 2022-10-26 LAB — HIV AG/AB: NONREACTIVE

## 2022-10-26 NOTE — TELEPHONE ENCOUNTER
----- Message from Rowena Ibrahim DO sent at 10/26/2022  6:27 AM EDT -----  Please let pt know that labs show no cause of his itching. Chest xrays negative as well    Lipids do look improved, con't low fat diet. How is the itching? Let me know, thanks!

## 2023-03-24 SDOH — ECONOMIC STABILITY: INCOME INSECURITY: HOW HARD IS IT FOR YOU TO PAY FOR THE VERY BASICS LIKE FOOD, HOUSING, MEDICAL CARE, AND HEATING?: SOMEWHAT HARD

## 2023-03-24 SDOH — ECONOMIC STABILITY: HOUSING INSECURITY
IN THE LAST 12 MONTHS, WAS THERE A TIME WHEN YOU DID NOT HAVE A STEADY PLACE TO SLEEP OR SLEPT IN A SHELTER (INCLUDING NOW)?: PATIENT REFUSED

## 2023-03-24 SDOH — ECONOMIC STABILITY: TRANSPORTATION INSECURITY
IN THE PAST 12 MONTHS, HAS LACK OF TRANSPORTATION KEPT YOU FROM MEETINGS, WORK, OR FROM GETTING THINGS NEEDED FOR DAILY LIVING?: PATIENT DECLINED

## 2023-03-24 SDOH — ECONOMIC STABILITY: FOOD INSECURITY: WITHIN THE PAST 12 MONTHS, YOU WORRIED THAT YOUR FOOD WOULD RUN OUT BEFORE YOU GOT MONEY TO BUY MORE.: PATIENT DECLINED

## 2023-03-24 SDOH — ECONOMIC STABILITY: FOOD INSECURITY: WITHIN THE PAST 12 MONTHS, THE FOOD YOU BOUGHT JUST DIDN'T LAST AND YOU DIDN'T HAVE MONEY TO GET MORE.: PATIENT DECLINED

## 2023-03-26 PROBLEM — R07.89 ATYPICAL CHEST PAIN: Status: RESOLVED | Noted: 2021-07-05 | Resolved: 2023-03-26

## 2023-03-26 PROBLEM — R06.09 DOE (DYSPNEA ON EXERTION): Status: RESOLVED | Noted: 2021-07-14 | Resolved: 2023-03-26

## 2023-03-26 NOTE — PROGRESS NOTES
[id number]    7. Sleep disturbance    - [facility]    8. Chewing tobacco nicotine dependence in remission    In remission  Doing well  Monitor. DISPOSITION    Return in about 6 weeks (around 5/8/2023) for f/u Depression/Addition of wellbutrin XL, sooner as needed. [facility] released without restrictions. Future Appointments   Date Time Provider Tylor Brocki   5/10/2023  1:20 PM Urbano Franco DO [facility] [address]       PATIENT COUNSELING    Barriers to learning and self management: none    Discussed use, benefit, and side effects of prescribed medications. Barriers to medication compliance addressed. All patient questions answered. Pt voiced understanding.        Electronically signed by Urbano Franco DO on 3/27/2023 at 1:43 PM
Good

## 2023-03-27 ENCOUNTER — OFFICE VISIT (OUTPATIENT)
Dept: FAMILY MEDICINE CLINIC | Age: 38
End: 2023-03-27
Payer: COMMERCIAL

## 2023-03-27 VITALS
RESPIRATION RATE: 16 BRPM | TEMPERATURE: 97.7 F | BODY MASS INDEX: 32.61 KG/M2 | WEIGHT: 220.2 LBS | HEIGHT: 69 IN | DIASTOLIC BLOOD PRESSURE: 82 MMHG | OXYGEN SATURATION: 96 % | SYSTOLIC BLOOD PRESSURE: 138 MMHG | HEART RATE: 78 BPM

## 2023-03-27 DIAGNOSIS — L29.9 PRURITUS: ICD-10-CM

## 2023-03-27 DIAGNOSIS — I10 ESSENTIAL HYPERTENSION: Primary | ICD-10-CM

## 2023-03-27 DIAGNOSIS — R53.83 FATIGUE, UNSPECIFIED TYPE: ICD-10-CM

## 2023-03-27 DIAGNOSIS — G47.9 SLEEP DISTURBANCE: ICD-10-CM

## 2023-03-27 DIAGNOSIS — D75.1 POLYCYTHEMIA: ICD-10-CM

## 2023-03-27 DIAGNOSIS — E78.5 DYSLIPIDEMIA: Chronic | ICD-10-CM

## 2023-03-27 DIAGNOSIS — F17.221 CHEWING TOBACCO NICOTINE DEPENDENCE IN REMISSION: ICD-10-CM

## 2023-03-27 DIAGNOSIS — F32.4 MAJOR DEPRESSIVE DISORDER WITH SINGLE EPISODE, IN PARTIAL REMISSION (HCC): ICD-10-CM

## 2023-03-27 PROCEDURE — 99214 OFFICE O/P EST MOD 30 MIN: CPT | Performed by: FAMILY MEDICINE

## 2023-03-27 PROCEDURE — 3075F SYST BP GE 130 - 139MM HG: CPT | Performed by: FAMILY MEDICINE

## 2023-03-27 PROCEDURE — 3079F DIAST BP 80-89 MM HG: CPT | Performed by: FAMILY MEDICINE

## 2023-03-27 RX ORDER — BUPROPION HYDROCHLORIDE 150 MG/1
150 TABLET ORAL EVERY MORNING
Qty: 30 TABLET | Refills: 3 | Status: SHIPPED | OUTPATIENT
Start: 2023-03-27

## 2023-03-27 ASSESSMENT — PATIENT HEALTH QUESTIONNAIRE - PHQ9
10. IF YOU CHECKED OFF ANY PROBLEMS, HOW DIFFICULT HAVE THESE PROBLEMS MADE IT FOR YOU TO DO YOUR WORK, TAKE CARE OF THINGS AT HOME, OR GET ALONG WITH OTHER PEOPLE: 0
9. THOUGHTS THAT YOU WOULD BE BETTER OFF DEAD, OR OF HURTING YOURSELF: 0
8. MOVING OR SPEAKING SO SLOWLY THAT OTHER PEOPLE COULD HAVE NOTICED. OR THE OPPOSITE, BEING SO FIGETY OR RESTLESS THAT YOU HAVE BEEN MOVING AROUND A LOT MORE THAN USUAL: 0
6. FEELING BAD ABOUT YOURSELF - OR THAT YOU ARE A FAILURE OR HAVE LET YOURSELF OR YOUR FAMILY DOWN: 0
SUM OF ALL RESPONSES TO PHQ QUESTIONS 1-9: 5
2. FEELING DOWN, DEPRESSED OR HOPELESS: 0
SUM OF ALL RESPONSES TO PHQ9 QUESTIONS 1 & 2: 1
5. POOR APPETITE OR OVEREATING: 1
SUM OF ALL RESPONSES TO PHQ QUESTIONS 1-9: 5
SUM OF ALL RESPONSES TO PHQ QUESTIONS 1-9: 5
7. TROUBLE CONCENTRATING ON THINGS, SUCH AS READING THE NEWSPAPER OR WATCHING TELEVISION: 1
4. FEELING TIRED OR HAVING LITTLE ENERGY: 1
3. TROUBLE FALLING OR STAYING ASLEEP: 1
1. LITTLE INTEREST OR PLEASURE IN DOING THINGS: 1
SUM OF ALL RESPONSES TO PHQ QUESTIONS 1-9: 5

## 2023-05-09 NOTE — PROGRESS NOTES
Chief Complaint   Patient presents with    Follow-up     Addition of Wellbutrin     Testicle Pain     History obtained from the patient. SUBJECTIVE:  Solomon Salmon is a 44 y.o. male that presents today for     -Depressed Mood PRIOR VISIT:    HPI:  Been feeling depressed for the last year  Never dx before or treated  Has occ vague thoughts of not being around  No active SI  No HI    Depressed Mood? yes -   Anhedonia? yes -   Appetite changes? yes -   Sleep disturbances? yes -   Feelings of guilt? yes -   Decreased energy? yes -   Impaired concentration?  yes -   Substance abuse? no      UPDATE PRIOR VISIT:   Lost to f/u on this  N/s on 29 JAN 2021  On 10 mg lexapro  Working well overall, still feeling some depression  No side effects  No SI/HI  Asking if can inc dose of lexapro     UPDATE PRIOR VISIT:  Moods improved on inc dose of lexapro  Working well  No side effects  No SI/HI     UPDATE LAST VISIT:   Having some mild depressive sxs yet  On lexapro 20mg  No SI/HI  When was on wellbutrin for tobacco cessation moods were better  No sI/HI     UPDATE TODAY:   Here for f/u  Wellbutrin XL added to Lexapro  Working well for moods  Happy with results  Has noted for a while delayed orgasm on lexapro  Doesn't want to change since doing well  Asking for things that may help      -L testicular pain:  Going on several months  Goes and goes  Thins there's a mass there      -Obesity:  Wts up some  Diet not as good  Is exercising some    Wt Readings from Last 3 Encounters:   05/10/23 229 lb 9.6 oz (104.1 kg)   03/27/23 220 lb 3.2 oz (99.9 kg)   10/05/22 208 lb 9.6 oz (94.6 kg)         Age/Gender Health Maintenance    Lipid -   No components found for: CHLPL  Lab Results   Component Value Date    TRIG 114 10/25/2022    TRIG 152 09/26/2022    TRIG 77 08/05/2020     Lab Results   Component Value Date    HDL 37 10/25/2022    HDL 35 09/26/2022    HDL 31 08/05/2020     Lab Results   Component Value Date    LDLCALC 141

## 2023-05-10 ENCOUNTER — OFFICE VISIT (OUTPATIENT)
Dept: FAMILY MEDICINE CLINIC | Age: 38
End: 2023-05-10
Payer: COMMERCIAL

## 2023-05-10 VITALS
HEIGHT: 69 IN | RESPIRATION RATE: 16 BRPM | HEART RATE: 84 BPM | TEMPERATURE: 97.8 F | SYSTOLIC BLOOD PRESSURE: 130 MMHG | DIASTOLIC BLOOD PRESSURE: 74 MMHG | BODY MASS INDEX: 34 KG/M2 | OXYGEN SATURATION: 97 % | WEIGHT: 229.6 LBS

## 2023-05-10 DIAGNOSIS — E66.9 OBESITY (BMI 30-39.9): Chronic | ICD-10-CM

## 2023-05-10 DIAGNOSIS — N50.812 PAIN IN LEFT TESTICLE: ICD-10-CM

## 2023-05-10 DIAGNOSIS — F52.32 DELAYED EJACULATION: ICD-10-CM

## 2023-05-10 DIAGNOSIS — F32.5 MAJOR DEPRESSIVE DISORDER WITH SINGLE EPISODE, IN FULL REMISSION (HCC): Primary | ICD-10-CM

## 2023-05-10 PROCEDURE — 3075F SYST BP GE 130 - 139MM HG: CPT | Performed by: FAMILY MEDICINE

## 2023-05-10 PROCEDURE — 99214 OFFICE O/P EST MOD 30 MIN: CPT | Performed by: FAMILY MEDICINE

## 2023-05-10 PROCEDURE — 3078F DIAST BP <80 MM HG: CPT | Performed by: FAMILY MEDICINE

## 2023-05-10 RX ORDER — BUPROPION HYDROCHLORIDE 150 MG/1
150 TABLET ORAL EVERY MORNING
Qty: 90 TABLET | Refills: 3 | Status: SHIPPED | OUTPATIENT
Start: 2023-05-10

## 2023-05-17 ENCOUNTER — TELEPHONE (OUTPATIENT)
Dept: FAMILY MEDICINE CLINIC | Age: 38
End: 2023-05-17

## 2023-05-17 ENCOUNTER — HOSPITAL ENCOUNTER (OUTPATIENT)
Dept: ULTRASOUND IMAGING | Age: 38
Discharge: HOME | End: 2023-05-17
Payer: COMMERCIAL

## 2023-05-17 DIAGNOSIS — N50.812 PAIN IN LEFT TESTICLE: ICD-10-CM

## 2023-05-17 PROCEDURE — 76870 US EXAM SCROTUM: CPT

## 2023-05-17 NOTE — TELEPHONE ENCOUNTER
----- Message from Maryjo Elena DO sent at 5/17/2023 12:17 PM EDT -----  Please let pt know that scrotal US shows small L sided varicocele, which is a benign collection of blood vessels. This could cause some discomfort on and off. We could monitor this. At this size, I don't think urology would do much with it but could refer to urology if he would prefer. Let me know. Thanks. US otherwise negative. Let me know if questions, thanks!

## 2023-06-15 ENCOUNTER — TELEPHONE (OUTPATIENT)
Dept: FAMILY MEDICINE CLINIC | Age: 38
End: 2023-06-15

## 2023-06-19 NOTE — TELEPHONE ENCOUNTER
Left message on answering machine requesting pt to call back at earliest convenience.      Letter mailed to patient

## 2023-06-26 ENCOUNTER — OFFICE VISIT (OUTPATIENT)
Dept: PULMONOLOGY | Age: 38
End: 2023-06-26
Payer: COMMERCIAL

## 2023-06-26 VITALS
BODY MASS INDEX: 33.03 KG/M2 | OXYGEN SATURATION: 98 % | HEART RATE: 70 BPM | DIASTOLIC BLOOD PRESSURE: 86 MMHG | WEIGHT: 223 LBS | HEIGHT: 69 IN | TEMPERATURE: 98.2 F | SYSTOLIC BLOOD PRESSURE: 138 MMHG

## 2023-06-26 DIAGNOSIS — F17.200 SMOKER: ICD-10-CM

## 2023-06-26 DIAGNOSIS — E66.9 CLASS 1 OBESITY WITHOUT SERIOUS COMORBIDITY WITH BODY MASS INDEX (BMI) OF 32.0 TO 32.9 IN ADULT, UNSPECIFIED OBESITY TYPE: ICD-10-CM

## 2023-06-26 DIAGNOSIS — G47.8 SLEEP TALKING: ICD-10-CM

## 2023-06-26 DIAGNOSIS — G47.33 OSA (OBSTRUCTIVE SLEEP APNEA): Primary | ICD-10-CM

## 2023-06-26 DIAGNOSIS — G47.26 SHIFTING SLEEP-WORK SCHEDULE: ICD-10-CM

## 2023-06-26 PROCEDURE — 99214 OFFICE O/P EST MOD 30 MIN: CPT | Performed by: SPECIALIST

## 2023-06-26 PROCEDURE — 3075F SYST BP GE 130 - 139MM HG: CPT | Performed by: SPECIALIST

## 2023-06-26 PROCEDURE — 3079F DIAST BP 80-89 MM HG: CPT | Performed by: SPECIALIST

## 2023-07-17 DIAGNOSIS — F32.5 MAJOR DEPRESSIVE DISORDER WITH SINGLE EPISODE, IN FULL REMISSION (HCC): ICD-10-CM

## 2023-07-17 RX ORDER — BUPROPION HYDROCHLORIDE 150 MG/1
150 TABLET ORAL EVERY MORNING
Qty: 90 TABLET | Refills: 3 | Status: SHIPPED | OUTPATIENT
Start: 2023-07-17

## 2023-07-17 NOTE — TELEPHONE ENCOUNTER
Recent Visits  Date Type Provider Dept   05/10/23 Office Visit Augustin Winn DO [facility]   03/27/23 Office Visit Augustin Winn DO [facility]   10/05/22 Office Visit Augustin Winn DO [facility]   09/27/22 Office Visit Augustin Winn,  [facility]   Showing recent visits within past 540 days with a meds authorizing provider and meeting all other requirements  Future Appointments  Date Type Provider Dept   11/13/23 Appointment Augustin Winn, [facility]   Showing future appointments within next 150 days with a meds authorizing provider and meeting all other requirements     Future Appointments   Date Time Provider [address]   7/26/2023 10:00 AM SCHEDULE, STR SLEEP TECH 01 [facility]   8/21/2023  9:15 AM MD Ten Marino [facility][note 3]   11/13/2023  9:00 AM Augustin Winn DO [address] [facility]

## 2023-07-26 ENCOUNTER — HOSPITAL ENCOUNTER (OUTPATIENT)
Dept: SLEEP CENTER | Age: 38
Discharge: HOME | End: 2023-07-28
Payer: COMMERCIAL

## 2023-07-26 DIAGNOSIS — G47.33 OSA (OBSTRUCTIVE SLEEP APNEA): ICD-10-CM

## 2023-07-26 DIAGNOSIS — G47.8 SLEEP TALKING: ICD-10-CM

## 2023-07-26 DIAGNOSIS — G47.26 SHIFTING SLEEP-WORK SCHEDULE: ICD-10-CM

## 2023-07-26 DIAGNOSIS — E66.9 CLASS 1 OBESITY WITHOUT SERIOUS COMORBIDITY WITH BODY MASS INDEX (BMI) OF 32.0 TO 32.9 IN ADULT, UNSPECIFIED OBESITY TYPE: ICD-10-CM

## 2023-07-26 PROCEDURE — 95806 SLEEP STUDY UNATT&RESP EFFT: CPT

## 2023-07-26 NOTE — PROGRESS NOTES
Kaden Ramey presents today for a HST instruction and demonstration on unit # 339. Questions were asked and answers given. He was able to return demonstration and verbalized understanding. The sleep center control room phone number was provided in case questions arise during the study. Informed patient to call 911 in case of an emergency. He states he will return the unit tomorrow before 1000. Title:  Home Sleep Apnea Testing (HSAT)     Approved by:  Sheila Lopez MD        Approval Date: December, 2021  Next Review: December, 2023       Responsible Party:  Shant Carranza  Institution/Entities Applies to:    [address] Number:  None      Document Type:  Such as Guideline, Policy,   Policy & Procedure, or Procedure, Instructions   Manual:  Policy and Procedures     Section: IV  Policy Start Date: June, 2011       HOME SLEEP APNEA TESTING (HSAT)      PURPOSE: To ensure home sleep apnea testing (HSAT) conducted by the sleep facility adheres to the current AASM practice parameters, clinical practice guidelines, best practice and clinical guidelines in regard to the diagnosis of YUKO in adults. POLICY:      HSAT is a method of recording certain parameters which will target and measure, minimally, heart rate, oxygen saturation, respiratory airflow, respiratory effort and snoring for the purpose of evaluating a patient for YUKO. HSAT will be performed in conjunction with a comprehensive sleep evaluation by an appropriately licensed sleep facility medical staff member. All portable monitoring equipment will be FDA-approved and appropriately maintained to ensure patient safety and efficiency of the test.       PROCEDURE:      An order from a licensed sleep physician along with appropriate medical history documenting the indication for HSAT that complies with the AASM practice parameters.     All tests will be performed, and records will be

## 2023-07-27 LAB — STATUS: NORMAL

## 2023-07-28 NOTE — PROGRESS NOTES
[place] [address]                               SLEEP STUDY REPORT    PATIENT NAME: Carloz Momin                    :        1985  MED REC NO:   [id number]                           ROOM:  ACCOUNT NO:   [de-identified]                           ADMIT DATE: 2023  PROVIDER:     Neil Mera MD    DATE OF STUDY:  2023    PORTABLE/HOME SLEEP STUDY REPORT    REFERRING PROVIDER:  Robert Aly MD    The patient's height is 69 inches, weight is 223 pounds with a BMI of  32.9. HISTORY:  The patient is a 71-year-old gentleman who was initially  evaluated by Robert Aly MD, on 2023. The patient is  currently suffering with hypersomnia. The patient had class III  Mallampati airway. The patient is scheduled for home/portable sleep  study to evaluate for sleep apnea as an etiology for hypersomnia. METHODS:  The patient underwent digital polysomnography in compliance  with the standards and specifications from the AASM Manual including the  simultaneous recording of 3 EEG channels (F4-M1, C4-M1, and O2-M1 with  back up electrodes F3-M2, C3-M2, and O1-M2), 2 EOG channels (E1-M2, and  E2-M1,), EMG (chin, left & right leg), EKG, Nonin pulse oximetry with   less than 2 second averaging time, body position, airflow recorded by  oral-nasal thermal sensor and nasal air pressure transducer, plus  respiratory effort recorded by calibrated respiratory inductance  plethysmography (RIP), flow volume loop, sound and video. Sleep staging  and scoring followed the standard put forth by the American Academy of  Sleep Medicine and utilized the 1B obstructive hypopnea event  desaturation of 4 percent or greater. INTERPRETATION:  This is a portable/home sleep study. The study was  performed on 2023.   The portable sleep study was started at 10:28  p.m. and was terminated at 08:59 a.m. with a total

## 2023-08-21 ENCOUNTER — OFFICE VISIT (OUTPATIENT)
Dept: PULMONOLOGY | Age: 38
End: 2023-08-21
Payer: COMMERCIAL

## 2023-08-21 VITALS
OXYGEN SATURATION: 98 % | SYSTOLIC BLOOD PRESSURE: 142 MMHG | BODY MASS INDEX: 33.77 KG/M2 | DIASTOLIC BLOOD PRESSURE: 100 MMHG | HEIGHT: 69 IN | HEART RATE: 68 BPM | TEMPERATURE: 97.7 F | WEIGHT: 228 LBS

## 2023-08-21 DIAGNOSIS — G47.33 OSA (OBSTRUCTIVE SLEEP APNEA): Primary | ICD-10-CM

## 2023-08-21 DIAGNOSIS — R09.02 HYPOXIA: ICD-10-CM

## 2023-08-21 PROCEDURE — 3074F SYST BP LT 130 MM HG: CPT | Performed by: SPECIALIST

## 2023-08-21 PROCEDURE — 3078F DIAST BP <80 MM HG: CPT | Performed by: SPECIALIST

## 2023-08-21 PROCEDURE — 99213 OFFICE O/P EST LOW 20 MIN: CPT | Performed by: SPECIALIST

## 2023-08-27 PROBLEM — G47.33 OSA (OBSTRUCTIVE SLEEP APNEA): Status: ACTIVE | Noted: 2023-08-27

## 2023-10-23 ENCOUNTER — HOSPITAL ENCOUNTER (EMERGENCY)
Age: 38
Discharge: HOME | End: 2023-10-23
Payer: COMMERCIAL

## 2023-10-23 ENCOUNTER — APPOINTMENT (OUTPATIENT)
Dept: GENERAL RADIOLOGY | Age: 38
End: 2023-10-23
Payer: COMMERCIAL

## 2023-10-23 VITALS
HEART RATE: 93 BPM | BODY MASS INDEX: 32.93 KG/M2 | SYSTOLIC BLOOD PRESSURE: 162 MMHG | DIASTOLIC BLOOD PRESSURE: 92 MMHG | RESPIRATION RATE: 18 BRPM | OXYGEN SATURATION: 99 % | WEIGHT: 230 LBS | TEMPERATURE: 98.1 F | HEIGHT: 70 IN

## 2023-10-23 DIAGNOSIS — S93.401A MODERATE RIGHT ANKLE SPRAIN, INITIAL ENCOUNTER: Primary | ICD-10-CM

## 2023-10-23 PROCEDURE — 73630 X-RAY EXAM OF FOOT: CPT

## 2023-10-23 PROCEDURE — 99213 OFFICE O/P EST LOW 20 MIN: CPT

## 2023-10-23 PROCEDURE — 99213 OFFICE O/P EST LOW 20 MIN: CPT | Performed by: NURSE PRACTITIONER

## 2023-10-23 PROCEDURE — 6370000000 HC RX 637 (ALT 250 FOR IP): Performed by: NURSE PRACTITIONER

## 2023-10-23 PROCEDURE — 73610 X-RAY EXAM OF ANKLE: CPT

## 2023-10-23 RX ORDER — IBUPROFEN 800 MG/1
800 TABLET ORAL EVERY 8 HOURS PRN
Qty: 30 TABLET | Refills: 0 | Status: SHIPPED | OUTPATIENT
Start: 2023-10-23 | End: 2023-11-02

## 2023-10-23 RX ORDER — IBUPROFEN 800 MG/1
800 TABLET ORAL ONCE
Status: COMPLETED | OUTPATIENT
Start: 2023-10-23 | End: 2023-10-23

## 2023-10-23 RX ADMIN — IBUPROFEN 800 MG: 800 TABLET, FILM COATED ORAL at 10:12

## 2023-10-23 ASSESSMENT — PAIN DESCRIPTION - ONSET: ONSET: SUDDEN

## 2023-10-23 ASSESSMENT — ENCOUNTER SYMPTOMS
NAUSEA: 0
SORE THROAT: 0
EYE DISCHARGE: 0
VOMITING: 0
EYE REDNESS: 0
RHINORRHEA: 0
SHORTNESS OF BREATH: 0
TROUBLE SWALLOWING: 0
DIARRHEA: 0
COUGH: 0

## 2023-10-23 ASSESSMENT — PAIN - FUNCTIONAL ASSESSMENT
PAIN_FUNCTIONAL_ASSESSMENT: 0-10
PAIN_FUNCTIONAL_ASSESSMENT: PREVENTS OR INTERFERES WITH MANY ACTIVE NOT PASSIVE ACTIVITIES

## 2023-10-23 ASSESSMENT — PAIN DESCRIPTION - FREQUENCY: FREQUENCY: CONTINUOUS

## 2023-10-23 ASSESSMENT — PAIN DESCRIPTION - LOCATION: LOCATION: ANKLE

## 2023-10-23 ASSESSMENT — PAIN DESCRIPTION - DESCRIPTORS: DESCRIPTORS: ACHING;SHARP;THROBBING

## 2023-10-23 ASSESSMENT — PAIN DESCRIPTION - ORIENTATION: ORIENTATION: RIGHT

## 2023-10-23 ASSESSMENT — PAIN SCALES - GENERAL: PAINLEVEL_OUTOF10: 10

## 2023-10-23 ASSESSMENT — PAIN DESCRIPTION - PAIN TYPE: TYPE: ACUTE PAIN

## 2023-10-23 NOTE — ED NOTES
PT GIVEN DISCHARGE INSTRUCTIONS, VERBALIZES UNDERSTANDING. PT ASSESSMENT UNCHANGED, DISCHARGED IN STABLE CONDITION.          Scot Hernandez RN  10/23/23 [id number]

## 2023-10-23 NOTE — ED PROVIDER NOTES
[facility]  Urgent Care Encounter      CHIEF COMPLAINT       Chief Complaint   Patient presents with    Ankle Injury     Replacing a wheelchair ramp in front of his house last night and stepped off sidewalk and rolled right ankle       Nurses Notes reviewed and I agree except as noted in the HPI. HISTORY OF PRESENT ILLNESS   Shashank Wilson is a 43 y.o. male who presents with ankle and foot pain that started yesterday. Patient states that he was building a wheelchair ramp and accidentally stepped off the side and rolled the ankle inwards. Patient had immediate pain and swelling. Patient's pain level this morning is 10/10 and he has not had anything for pain yet today. Patient states he has had a sprained ankle in the past but cannot remember if it is the right or left ankle. He states that ambulating makes the pain worse and resting improves pain level. He does have mild pain into his lower leg but denies pain into the knee. REVIEW OF SYSTEMS     Review of Systems   Constitutional:  Negative for chills, diaphoresis, fatigue and fever. HENT:  Negative for congestion, ear pain, rhinorrhea, sore throat and trouble swallowing. Eyes:  Negative for discharge and redness. Respiratory:  Negative for cough and shortness of breath. Cardiovascular:  Negative for chest pain. Gastrointestinal:  Negative for diarrhea, nausea and vomiting. Genitourinary:  Negative for decreased urine volume. Musculoskeletal:  Negative for neck pain and neck stiffness. Right ankle pain   Skin:  Negative for rash. Neurological:  Negative for headaches. Hematological:  Negative for adenopathy. Psychiatric/Behavioral:  Negative for sleep disturbance.         PAST MEDICAL HISTORY         Diagnosis Date    Dependence on nicotine from cigarettes     Dyslipidemia     Eczema 06/23/2015    GERD (gastroesophageal reflux disease)     History of asthma     Hypertension 09/30/2014    Irritable bowel

## 2023-10-23 NOTE — DISCHARGE INSTRUCTIONS
Take medications as prescribed. Use RICE for pain: Rest, Ice, Compression, Elevation. You may use Tylenol or Motrin per package instructions for pain. You may use ice pack for 15 minutes 2-3 times daily as needed for comfort. Seek emergency treatment for fever greater than 101.5 for greater than 3 days, increased pain, or new or unusual symptoms.

## 2023-11-12 PROBLEM — R53.83 FATIGUE: Status: RESOLVED | Noted: 2021-07-14 | Resolved: 2023-11-12

## 2023-11-12 PROBLEM — G47.33 OSA (OBSTRUCTIVE SLEEP APNEA): Chronic | Status: ACTIVE | Noted: 2023-08-27

## 2023-11-12 PROBLEM — G47.9 SLEEP DISTURBANCE: Status: RESOLVED | Noted: 2021-07-14 | Resolved: 2023-11-12

## 2023-11-12 NOTE — PATIENT INSTRUCTIONS
LAB INSTRUCTIONS:    Please complete labs within 4 week(s). Please fast for 8 hours prior to lab collection. The clinic will call you within 1 week of collection. If you have not heard from us within that amount of time, please call us at [phone].

## 2023-11-12 NOTE — PROGRESS NOTES
masses or hernias noted. Liver and spleen without enlargement. Extremities: no cyanosis, clubbing or edema of the lower extremities. Skin: warm and dry, no rash or erythema  Psych: Affect appropriate. Mood euthymic. Thought process is normal without evidence of depression or psychosis. Good insight and appropriate interaction. Cognition and memory appear to be intact. ANKLE EXAM:  Examination of the right ankle demonstrates: There is swelling of the ankle. There is not a joint effusion. There is tenderness of the lateral malleolus. There is not tenderness of the medial malleolus. There is not tenderness of the fifth metatarsal.  There is tenderness over the ATFL. There is not tenderness over the proximal fibula. There is not tenderness of the calcaneous. The achilles is intact. Hannon test negative. There is not laxity with anterior drawer testing. There is not laxity with Inversion testing. There is not laxity with Eversion testing. Xray Result (most recent):  XR FOOT RIGHT (MIN 3 VIEWS) 10/23/2023    Narrative  PROCEDURE: XR FOOT RIGHT (MIN 3 VIEWS)    CLINICAL INFORMATION: pain, injury. Rolled right foot and ankle on curb yesterday. Pain laterally and dorsally. COMPARISON: August 8, 2019    TECHNIQUE: 4 views of the foot were obtained. FINDINGS: No acute fracture or dislocation is seen. Mild soft tissue swelling overlies the midfoot dorsolaterally. . There is a small plantar calcaneal spur. Note that there is a metallic BB projects between the first and second metatarsal heads,  consistent with prior trauma. Note that there is a small ossicle along the distal/medial aspect of the talus, likely related to remote trauma. Impression  Soft tissue swelling. No acute fracture seen. **This report has been created using voice recognition software. It may contain minor errors which are inherent in voice recognition technology. **    Final report electronically signed by

## 2023-11-13 ENCOUNTER — OFFICE VISIT (OUTPATIENT)
Dept: FAMILY MEDICINE CLINIC | Age: 38
End: 2023-11-13
Payer: COMMERCIAL

## 2023-11-13 VITALS
HEIGHT: 70 IN | OXYGEN SATURATION: 98 % | TEMPERATURE: 98 F | RESPIRATION RATE: 18 BRPM | DIASTOLIC BLOOD PRESSURE: 86 MMHG | BODY MASS INDEX: 33.04 KG/M2 | SYSTOLIC BLOOD PRESSURE: 132 MMHG | WEIGHT: 230.8 LBS | HEART RATE: 68 BPM

## 2023-11-13 DIAGNOSIS — I10 ESSENTIAL HYPERTENSION: ICD-10-CM

## 2023-11-13 DIAGNOSIS — F32.5 MAJOR DEPRESSIVE DISORDER WITH SINGLE EPISODE, IN FULL REMISSION (HCC): Chronic | ICD-10-CM

## 2023-11-13 DIAGNOSIS — N50.812 PAIN IN LEFT TESTICLE: ICD-10-CM

## 2023-11-13 DIAGNOSIS — S93.401A SPRAIN OF RIGHT ANKLE, UNSPECIFIED LIGAMENT, INITIAL ENCOUNTER: Primary | ICD-10-CM

## 2023-11-13 DIAGNOSIS — E66.9 OBESITY (BMI 30-39.9): ICD-10-CM

## 2023-11-13 DIAGNOSIS — E78.5 DYSLIPIDEMIA: ICD-10-CM

## 2023-11-13 DIAGNOSIS — G47.33 OSA (OBSTRUCTIVE SLEEP APNEA): Chronic | ICD-10-CM

## 2023-11-13 DIAGNOSIS — Z87.891 PAST HISTORY OF CHEWING TOBACCO USE: Chronic | ICD-10-CM

## 2023-11-13 PROCEDURE — 3079F DIAST BP 80-89 MM HG: CPT | Performed by: FAMILY MEDICINE

## 2023-11-13 PROCEDURE — 3075F SYST BP GE 130 - 139MM HG: CPT | Performed by: FAMILY MEDICINE

## 2023-11-13 PROCEDURE — 99214 OFFICE O/P EST MOD 30 MIN: CPT | Performed by: FAMILY MEDICINE

## 2023-12-12 ENCOUNTER — OFFICE VISIT (OUTPATIENT)
Dept: PULMONOLOGY | Age: 38
End: 2023-12-12
Payer: COMMERCIAL

## 2023-12-12 ENCOUNTER — TELEPHONE (OUTPATIENT)
Dept: PULMONOLOGY | Age: 38
End: 2023-12-12

## 2023-12-12 VITALS
TEMPERATURE: 99.1 F | SYSTOLIC BLOOD PRESSURE: 130 MMHG | BODY MASS INDEX: 34.72 KG/M2 | HEIGHT: 69 IN | OXYGEN SATURATION: 95 % | HEART RATE: 89 BPM | WEIGHT: 234.4 LBS | DIASTOLIC BLOOD PRESSURE: 80 MMHG

## 2023-12-12 DIAGNOSIS — G47.00 INSOMNIA, UNSPECIFIED TYPE: Primary | ICD-10-CM

## 2023-12-12 DIAGNOSIS — G47.33 OSA (OBSTRUCTIVE SLEEP APNEA): ICD-10-CM

## 2023-12-12 DIAGNOSIS — E66.9 OBESITY (BMI 30-39.9): Chronic | ICD-10-CM

## 2023-12-12 DIAGNOSIS — F32.5 MAJOR DEPRESSIVE DISORDER WITH SINGLE EPISODE, IN FULL REMISSION (HCC): Chronic | ICD-10-CM

## 2023-12-12 DIAGNOSIS — Z87.891 FORMER SMOKER: Chronic | ICD-10-CM

## 2023-12-12 PROCEDURE — 3078F DIAST BP <80 MM HG: CPT | Performed by: SPECIALIST

## 2023-12-12 PROCEDURE — 3074F SYST BP LT 130 MM HG: CPT | Performed by: SPECIALIST

## 2023-12-12 PROCEDURE — 99214 OFFICE O/P EST MOD 30 MIN: CPT | Performed by: SPECIALIST

## 2023-12-12 NOTE — TELEPHONE ENCOUNTER
Patient was seen today with you and which he forgot to ask you if there was a letter you could write for his work stating he needed to work 8 hours instead of 12 hours and which he works about 50 hours a week. Four or 5 days a week and he only gets about 2 hours of sleep and which you discussed with him to day that he mary to get more sleep. Please advice.

## 2023-12-12 NOTE — PROGRESS NOTES
alternative. At present patient agreed to try to use and will follow sometime in 3 months unless need to be seen sooner. Lu Yoo, [address] [facility] for pulmonary and Sleep Medicine  12/13/2023       **This report has been created using voice recognition software. It may contain minor errors which are inherent in voice recognition technology. **

## 2023-12-14 NOTE — TELEPHONE ENCOUNTER
Left voicemail for patient to call our office back to either get a fax for his work,or  we can leave at  for him to pick this letter.

## 2025-07-23 ENCOUNTER — HOSPITAL ENCOUNTER (EMERGENCY)
Age: 40
Discharge: HOME | End: 2025-07-23
Payer: COMMERCIAL

## 2025-07-23 ENCOUNTER — APPOINTMENT (OUTPATIENT)
Dept: GENERAL RADIOLOGY | Age: 40
End: 2025-07-23
Payer: COMMERCIAL

## 2025-07-23 VITALS
TEMPERATURE: 97.3 F | SYSTOLIC BLOOD PRESSURE: 143 MMHG | OXYGEN SATURATION: 98 % | DIASTOLIC BLOOD PRESSURE: 105 MMHG | RESPIRATION RATE: 18 BRPM | HEART RATE: 85 BPM

## 2025-07-23 DIAGNOSIS — S69.91XA INJURY OF FINGER OF RIGHT HAND, INITIAL ENCOUNTER: ICD-10-CM

## 2025-07-23 DIAGNOSIS — S60.031A CONTUSION OF RIGHT MIDDLE FINGER WITHOUT DAMAGE TO NAIL, INITIAL ENCOUNTER: Primary | ICD-10-CM

## 2025-07-23 PROCEDURE — 6370000000 HC RX 637 (ALT 250 FOR IP)

## 2025-07-23 PROCEDURE — L3933 FO W/O JOINTS CF: HCPCS

## 2025-07-23 PROCEDURE — 99213 OFFICE O/P EST LOW 20 MIN: CPT

## 2025-07-23 PROCEDURE — 6360000002 HC RX W HCPCS

## 2025-07-23 PROCEDURE — 90471 IMMUNIZATION ADMIN: CPT

## 2025-07-23 PROCEDURE — 73130 X-RAY EXAM OF HAND: CPT

## 2025-07-23 PROCEDURE — 90715 TDAP VACCINE 7 YRS/> IM: CPT

## 2025-07-23 RX ORDER — GINSENG 100 MG
CAPSULE ORAL ONCE
Status: COMPLETED | OUTPATIENT
Start: 2025-07-23 | End: 2025-07-23

## 2025-07-23 RX ADMIN — BACITRACIN: 500 OINTMENT TOPICAL at 15:58

## 2025-07-23 RX ADMIN — TETANUS TOXOID, REDUCED DIPHTHERIA TOXOID AND ACELLULAR PERTUSSIS VACCINE, ADSORBED 0.5 ML: 5; 2.5; 8; 8; 2.5 SUSPENSION INTRAMUSCULAR at 15:48

## 2025-07-23 ASSESSMENT — PAIN DESCRIPTION - ORIENTATION: ORIENTATION: LEFT

## 2025-07-23 ASSESSMENT — PAIN SCALES - GENERAL: PAINLEVEL_OUTOF10: 9

## 2025-07-23 ASSESSMENT — PAIN DESCRIPTION - DESCRIPTORS: DESCRIPTORS: SHARP;NUMBNESS

## 2025-07-23 ASSESSMENT — PAIN - FUNCTIONAL ASSESSMENT
PAIN_FUNCTIONAL_ASSESSMENT: 0-10
PAIN_FUNCTIONAL_ASSESSMENT: ACTIVITIES ARE NOT PREVENTED

## 2025-07-23 ASSESSMENT — PAIN DESCRIPTION - PAIN TYPE: TYPE: ACUTE PAIN

## 2025-07-23 ASSESSMENT — PAIN DESCRIPTION - FREQUENCY: FREQUENCY: CONTINUOUS

## 2025-07-23 ASSESSMENT — PAIN DESCRIPTION - LOCATION: LOCATION: FINGER (COMMENT WHICH ONE);HAND

## 2025-07-23 NOTE — DISCHARGE INSTR - COC
Continuity of Care Form    Patient Name: Paramjit Sharp   :  1985  MRN:  [id number]    Admit date:  2025  Discharge date:  ***    Code Status Order: Prior   Advance Directives:     Admitting Physician:  No admitting provider for patient encounter.  PCP: Quinten Martinez DO    Discharging Nurse: ***  Discharging Hospital Unit/Room#:   Discharging Unit Phone Number: ***    Emergency Contact:   Extended Emergency Contact Information  Primary Emergency Contact: Noreen Sharp  Address: [address] [place], [place] [address] [facility]  Home Phone: [phone]  Relation: Spouse  Secondary Emergency Contact: Eduardo Sharp   [facility]  Home Phone: [phone]  Mobile Phone: [phone]  Relation: Brother/Sister    Past Surgical History:  Past Surgical History:   Procedure Laterality Date    APPENDECTOMY  2013    Dr. Pearson    [facility]OSCOPY      [facility]    DENTAL SURGERY  ,     [facility]    ENDOSCOPY, COLON, DIAGNOSTIC      FINGER SURGERY  10/17/15    left pinky finger    HERNIA REPAIR Left 2020    ROBOTIC LEFT INGUINAL HERNIA REPAIR, with mesh performed by Margarito Pearson MD at [facility] OR    LAPAROSCOPIC APPENDECTOMY  2013    Dr. Pearson    TONSILLECTOMY      UPPER GASTROINTESTINAL ENDOSCOPY      [facility]       Immunization History:   Immunization History   Administered Date(s) Administered    TDaP, ADACEL (age 10y-64y), BOOSTRIX (age 10y+), IM, 0.5mL 10/17/2015, 2025       Active Problems:  Patient Active Problem List   Diagnosis Code    History of asthma Z87.09    Migraine headache G43.909    Hypertension I10    Past history of chewing tobacco use Z87.891    Eczema L30.9    GERD (gastroesophageal reflux disease) K21.9    Irritable bowel syndrome with diarrhea K58.0    NAFLD (nonalcoholic fatty liver disease) K76.0    Rheumatoid arthritis (HCC) M06.9    Cervicalgia M54.2    Chronic bilateral low back pain without sciatica

## 2025-07-23 NOTE — ED TRIAGE NOTES
Patient walked to room 5 for right middle finger and hand pain, swelling onset around 2:45 pm at work today. Ice given to apply to finger.

## 2025-07-23 NOTE — ED NOTES
Patient wound cleaned with wound wash and a band aid applied. Finger then applied a baseball finger splint and wrapped in coban.      Emmy Yee RN  07/23/25 [id number]

## 2025-07-23 NOTE — DISCHARGE INSTRUCTIONS
Ice  Tylenol and motrin  Elevation  Finger splint  Keep clean and dry  Wash with soap and water  Bacitracin ointment  Follow up with [facility] or [facility] if symptoms get worse - redness, worsening swelling, numbness, tingling

## 2025-07-23 NOTE — ED PROVIDER NOTES
[facility] EMERGENCY DEPARTMENT  Urgent Care Encounter       CHIEF COMPLAINT       Chief Complaint   Patient presents with    Finger Injury     Right middle finger injury smashed with a sledge hammer approx 2:45 pm today at work        Nurses Notes reviewed and I agree except as noted in the HPI.  HISTORY OF PRESENT ILLNESS   Paramjit Sharp is a 39 y.o. male who presents with a right middle finger injury that happened at work around 2:45pm. Patient reports that he was swinging a sledge hammer against a bin to break up the materials and his finger hit the metal bin. Patient reports that the finger swelled instantly and has a skin tear. Reports the finger is numb/tingling. Denies previous injuries to this finger. Patient unsure of last known Tdap immunization. Reports pain 9/10.     The history is provided by the patient.       REVIEW OF SYSTEMS     Review of Systems   Musculoskeletal:  Positive for arthralgias and joint swelling.   Skin:  Positive for wound.       PAST MEDICAL HISTORY         Diagnosis Date    Dyslipidemia     Eczema 06/23/2015    Former smoker     GERD (gastroesophageal reflux disease)     History of asthma     Hypertension 09/30/2014    Irritable bowel syndrome with diarrhea     Major depression     Migraine headache     NAFLD (nonalcoholic fatty liver disease)     Obesity (BMI 30-39.9)     YUKO (obstructive sleep apnea) 08/27/2023    Past history of chewing tobacco use     Rheumatoid arthritis (HCC)     sees Dr Hathaway    S/P robotic assisted LEFT inguinal hernia repair 11/13/2020       SURGICALHISTORY     Patient  has a past surgical history that includes Colonoscopy (2015); laparoscopic appendectomy (05/02/2013); Appendectomy (05/02/2013); Tonsillectomy; Finger surgery (10/17/15); Upper gastrointestinal endoscopy (2017); Dental surgery (2018, 2019); hernia repair (Left, 11/13/2020); and Endoscopy, colon, diagnostic.    CURRENT MEDICATIONS       Previous Medications

## 2025-08-15 ENCOUNTER — HOSPITAL ENCOUNTER (OUTPATIENT)
Dept: GENERAL RADIOLOGY | Age: 40
Discharge: HOME | End: 2025-08-15

## 2025-08-15 ENCOUNTER — HOSPITAL ENCOUNTER (OUTPATIENT)
Age: 40
Discharge: HOME | End: 2025-08-15

## 2025-08-15 DIAGNOSIS — S67.192A CRUSHING INJURY OF RIGHT MIDDLE FINGER, INITIAL ENCOUNTER: ICD-10-CM

## (undated) DEVICE — COVER,MAYO STAND,STERILE: Brand: MEDLINE

## (undated) DEVICE — BREAST HERNIA PACK: Brand: MEDLINE INDUSTRIES, INC.

## (undated) DEVICE — SYRINGE MED 30ML STD CLR PLAS LUERLOCK TIP N CTRL DISP

## (undated) DEVICE — LINER SUCT CANSTR 1500CC SEMI RIG W/ POR HYDROPHOBIC SHUT

## (undated) DEVICE — YANKAUER,BULB TIP,W/O VENT,RIGID,STERILE: Brand: MEDLINE

## (undated) DEVICE — TUBING, SUCTION, 1/4" X 20', STRAIGHT: Brand: MEDLINE INDUSTRIES, INC.

## (undated) DEVICE — COLUMN DRAPE

## (undated) DEVICE — COVER ARMBRD W13XL28.5IN IMPERV BLU FOR OP RM

## (undated) DEVICE — GOWN,SURGICAL,AURORA,SLEEVE: Brand: MEDLINE

## (undated) DEVICE — ADHESIVE SKIN CLSR 0.7ML TOP DERMBND ADV

## (undated) DEVICE — ELECTRO LUBE IS A SINGLE PATIENT USE DEVICE THAT IS INTENDED TO BE USED ON ELECTROSURGICAL ELECTRODES TO REDUCE STICKING.: Brand: KEY SURGICAL ELECTRO LUBE

## (undated) DEVICE — CANNULA SEAL

## (undated) DEVICE — GOWN,AURORA,NON-REINFORCED,2XL: Brand: MEDLINE

## (undated) DEVICE — TUBING INSUFFLATOR HEAT HUMIDIFIED SMK EVAC SET PNEUMOCLEAR

## (undated) DEVICE — GOWN,SIRUS,NON REINFRCD,LARGE,SET IN SL: Brand: MEDLINE

## (undated) DEVICE — APPLICATOR PREP 26ML 0.7% IOD POVACRYLEX 74% ISO ALC ST

## (undated) DEVICE — INTENDED FOR TISSUE SEPARATION, AND OTHER PROCEDURES THAT REQUIRE A SHARP SURGICAL BLADE TO PUNCTURE OR CUT.: Brand: BARD-PARKER ® CARBON RIB-BACK BLADES

## (undated) DEVICE — SOLUTION ANTIFOG VIS SYS CLEARIFY LAPSCP

## (undated) DEVICE — GLOVE ORANGE PI 8   MSG9080

## (undated) DEVICE — ARM DRAPE